# Patient Record
Sex: FEMALE | Race: WHITE | NOT HISPANIC OR LATINO | Employment: STUDENT | URBAN - METROPOLITAN AREA
[De-identification: names, ages, dates, MRNs, and addresses within clinical notes are randomized per-mention and may not be internally consistent; named-entity substitution may affect disease eponyms.]

---

## 2017-01-24 ENCOUNTER — OFFICE VISIT (OUTPATIENT)
Dept: PSYCHIATRY | Facility: CLINIC | Age: 27
End: 2017-01-24
Payer: COMMERCIAL

## 2017-01-24 VITALS
WEIGHT: 131 LBS | DIASTOLIC BLOOD PRESSURE: 84 MMHG | HEIGHT: 68 IN | BODY MASS INDEX: 19.85 KG/M2 | SYSTOLIC BLOOD PRESSURE: 124 MMHG | HEART RATE: 83 BPM

## 2017-01-24 DIAGNOSIS — F33.41 RECURRENT MAJOR DEPRESSIVE DISORDER, IN PARTIAL REMISSION: ICD-10-CM

## 2017-01-24 DIAGNOSIS — F12.90 MARIJUANA SMOKER, CONTINUOUS: Primary | ICD-10-CM

## 2017-01-24 PROCEDURE — 1159F MED LIST DOCD IN RCRD: CPT | Mod: S$GLB,,, | Performed by: PSYCHIATRY & NEUROLOGY

## 2017-01-24 PROCEDURE — 99214 OFFICE O/P EST MOD 30 MIN: CPT | Mod: S$GLB,,, | Performed by: PSYCHIATRY & NEUROLOGY

## 2017-01-24 PROCEDURE — 99999 PR PBB SHADOW E&M-EST. PATIENT-LVL I: CPT | Mod: PBBFAC,,, | Performed by: PSYCHIATRY & NEUROLOGY

## 2017-01-24 PROCEDURE — 90838 PSYTX W PT W E/M 60 MIN: CPT | Mod: S$GLB,,, | Performed by: PSYCHIATRY & NEUROLOGY

## 2017-01-24 RX ORDER — VENLAFAXINE HYDROCHLORIDE 150 MG/1
150 CAPSULE, EXTENDED RELEASE ORAL DAILY
Qty: 30 CAPSULE | Refills: 5 | Status: SHIPPED | OUTPATIENT
Start: 2017-01-24 | End: 2017-09-14 | Stop reason: SDUPTHER

## 2017-02-02 ENCOUNTER — OFFICE VISIT (OUTPATIENT)
Dept: FAMILY MEDICINE | Facility: CLINIC | Age: 27
End: 2017-02-02
Attending: FAMILY MEDICINE
Payer: COMMERCIAL

## 2017-02-02 VITALS
OXYGEN SATURATION: 98 % | BODY MASS INDEX: 20.03 KG/M2 | DIASTOLIC BLOOD PRESSURE: 70 MMHG | HEART RATE: 84 BPM | SYSTOLIC BLOOD PRESSURE: 100 MMHG | TEMPERATURE: 98 F | WEIGHT: 132.19 LBS | HEIGHT: 68 IN

## 2017-02-02 DIAGNOSIS — L73.2 HIDRADENITIS SUPPURATIVA OF LEFT AXILLA: Primary | ICD-10-CM

## 2017-02-02 PROCEDURE — 99213 OFFICE O/P EST LOW 20 MIN: CPT | Mod: S$GLB,,, | Performed by: FAMILY MEDICINE

## 2017-02-02 PROCEDURE — 99999 PR PBB SHADOW E&M-EST. PATIENT-LVL III: CPT | Mod: PBBFAC,,, | Performed by: FAMILY MEDICINE

## 2017-02-02 RX ORDER — DOXYCYCLINE HYCLATE 150 MG/1
150 TABLET ORAL 2 TIMES DAILY
COMMUNITY
End: 2017-02-02 | Stop reason: ALTCHOICE

## 2017-02-02 RX ORDER — MUPIROCIN 20 MG/G
OINTMENT TOPICAL
Refills: 0 | COMMUNITY
Start: 2017-01-06 | End: 2019-11-05

## 2017-02-02 RX ORDER — TRIAMCINOLONE ACETONIDE 1 MG/G
CREAM TOPICAL
Refills: 0 | COMMUNITY
Start: 2017-01-06 | End: 2019-11-05

## 2017-02-02 RX ORDER — TETRACYCLINE HYDROCHLORIDE 500 MG/1
500 CAPSULE ORAL 2 TIMES DAILY
Qty: 20 CAPSULE | Refills: 0 | Status: SHIPPED | OUTPATIENT
Start: 2017-02-02 | End: 2017-02-12 | Stop reason: ALTCHOICE

## 2017-02-02 NOTE — MR AVS SNAPSHOT
[facility] Medicine  [address]  [address]  [facility] [address]  Phone: [phone]                  Aby Correa   2017 2:40 PM   Office Visit    Description:  Female : 1990   Provider:  Hitesh Keller Jr., MD   Department:  formerly [facility]           Reason for Visit     Mass           Diagnoses this Visit        Comments    Hidradenitis suppurativa of left axilla    -  Primary            To Do List           Future Appointments        Provider Department Dept Phone    2017 9:15 AM Emory Bender MD [facility] - General Surgery [phone]      Goals (5 Years of Data)     None       These Medications        Disp Refills Start End    tetracycline (ACHROMYCIN,SUMYCIN) 500 MG capsule 20 capsule 0 2017     Take 1 capsule (500 mg total) by mouth 2 (two) times daily. - Oral    Pharmacy: [facility]/pharmacy #[id number] - [place], [place] - [address] Ph #: [phone]         [facility] On Call     [facility] On Call Nurse Care Line -  Assistance  Registered nurses in the [facility] On Call Center provide clinical advisement, health education, appointment booking, and other advisory services.  Call for this free service at [phone].             Medications           Message regarding Medications     Verify the changes and/or additions to your medication regime listed below are the same as discussed with your clinician today.  If any of these changes or additions are incorrect, please notify your healthcare provider.        START taking these NEW medications        Refills    tetracycline (ACHROMYCIN,SUMYCIN) 500 MG capsule 0    Sig: Take 1 capsule (500 mg total) by mouth 2 (two) times daily.    Class: Normal    Route: Oral      STOP taking these medications     doxycycline hyclate (ACTICLATE) 150 mg Tab Take 150 mg by mouth 2 (two) times daily.           Verify that the below list of medications is an accurate representation of the medications  "you are currently taking.  If none reported, the list may be blank. If incorrect, please contact your healthcare provider. Carry this list with you in case of emergency.           Current Medications     mupirocin (BACTROBAN) 2 % ointment As directed    triamcinolone acetonide 0.1% (KENALOG) 0.1 % cream As directed    venlafaxine (EFFEXOR-XR) 150 MG Cp24 Take 1 capsule (150 mg total) by mouth once daily.    tetracycline (ACHROMYCIN,SUMYCIN) 500 MG capsule Take 1 capsule (500 mg total) by mouth 2 (two) times daily.           Clinical Reference Information           Your Vitals Were     BP Pulse Temp Height Weight SpO2    100/70 (BP Location: Right arm, Patient Position: Sitting, BP Method: Manual) 84 98.1 °F (36.7 °C) (Oral) 5' 8" (1.727 m) 60 kg (132 lb 3.2 oz) 98%    BMI                20.1 kg/m2          Blood Pressure          Most Recent Value    BP  100/70      Allergies as of 2/2/2017     No Known Allergies      Immunizations Administered on Date of Encounter - 2/2/2017     None      Orders Placed During Today's Visit      Normal Orders This Visit    Ambulatory referral to General Surgery       Language Assistance Services     ATTENTION: Language assistance services are available, free of charge. Please call [phone].      ATENCIÓN: Si kenisha smith, tiene a dobbins disposición servicios gratuitos de asistencia lingüística. Llame al [phone].     [facility] Ý: N?u b?n nói Ti?ng Vi?t, có các d?ch v? h? tr? ngôn ng? mi?n phí dành cho b?n. G?i s? [phone].         [facility] complies with applicable Federal civil rights laws and does not discriminate on the basis of race, color, national origin, age, disability, or sex.        "

## 2017-03-09 ENCOUNTER — PATIENT MESSAGE (OUTPATIENT)
Dept: PSYCHIATRY | Facility: CLINIC | Age: 27
End: 2017-03-09

## 2017-06-19 ENCOUNTER — PATIENT MESSAGE (OUTPATIENT)
Dept: PSYCHIATRY | Facility: CLINIC | Age: 27
End: 2017-06-19

## 2017-09-14 ENCOUNTER — PATIENT MESSAGE (OUTPATIENT)
Dept: FAMILY MEDICINE | Facility: CLINIC | Age: 27
End: 2017-09-14

## 2017-09-14 RX ORDER — VENLAFAXINE HYDROCHLORIDE 150 MG/1
150 CAPSULE, EXTENDED RELEASE ORAL DAILY
Qty: 30 CAPSULE | Refills: 1 | Status: SHIPPED | OUTPATIENT
Start: 2017-09-14 | End: 2017-11-15 | Stop reason: SDUPTHER

## 2017-09-16 ENCOUNTER — PATIENT MESSAGE (OUTPATIENT)
Dept: FAMILY MEDICINE | Facility: CLINIC | Age: 27
End: 2017-09-16

## 2017-11-15 ENCOUNTER — PATIENT MESSAGE (OUTPATIENT)
Dept: FAMILY MEDICINE | Facility: CLINIC | Age: 27
End: 2017-11-15

## 2017-11-15 RX ORDER — VENLAFAXINE HYDROCHLORIDE 150 MG/1
150 CAPSULE, EXTENDED RELEASE ORAL DAILY
Qty: 30 CAPSULE | Refills: 1 | Status: SHIPPED | OUTPATIENT
Start: 2017-11-15 | End: 2018-01-15

## 2019-11-06 ENCOUNTER — OFFICE VISIT (OUTPATIENT)
Dept: FAMILY MEDICINE | Facility: CLINIC | Age: 29
End: 2019-11-06
Attending: FAMILY MEDICINE
Payer: COMMERCIAL

## 2019-11-06 VITALS
BODY MASS INDEX: 23.08 KG/M2 | HEIGHT: 68 IN | SYSTOLIC BLOOD PRESSURE: 122 MMHG | OXYGEN SATURATION: 98 % | WEIGHT: 152.31 LBS | HEART RATE: 88 BPM | DIASTOLIC BLOOD PRESSURE: 82 MMHG

## 2019-11-06 DIAGNOSIS — Z00.00 ROUTINE GENERAL MEDICAL EXAMINATION AT A HEALTH CARE FACILITY: Primary | ICD-10-CM

## 2019-11-06 DIAGNOSIS — F33.41 RECURRENT MAJOR DEPRESSIVE DISORDER, IN PARTIAL REMISSION: ICD-10-CM

## 2019-11-06 DIAGNOSIS — F12.20 CANNABIS DEPENDENCE, UNCOMPLICATED: ICD-10-CM

## 2019-11-06 DIAGNOSIS — F41.1 GAD (GENERALIZED ANXIETY DISORDER): ICD-10-CM

## 2019-11-06 DIAGNOSIS — Z97.5 IUD (INTRAUTERINE DEVICE) IN PLACE: ICD-10-CM

## 2019-11-06 PROCEDURE — 99999 PR PBB SHADOW E&M-EST. PATIENT-LVL III: CPT | Mod: PBBFAC,,, | Performed by: FAMILY MEDICINE

## 2019-11-06 PROCEDURE — 99999 PR PBB SHADOW E&M-EST. PATIENT-LVL III: ICD-10-PCS | Mod: PBBFAC,,, | Performed by: FAMILY MEDICINE

## 2019-11-06 PROCEDURE — 99395 PREV VISIT EST AGE 18-39: CPT | Mod: S$GLB,,, | Performed by: FAMILY MEDICINE

## 2019-11-06 PROCEDURE — 99395 PR PREVENTIVE VISIT,EST,18-39: ICD-10-PCS | Mod: S$GLB,,, | Performed by: FAMILY MEDICINE

## 2019-11-06 RX ORDER — VENLAFAXINE HYDROCHLORIDE 150 MG/1
300 CAPSULE, EXTENDED RELEASE ORAL DAILY
Refills: 0 | COMMUNITY
Start: 2019-10-30 | End: 2019-11-06

## 2019-11-06 RX ORDER — VENLAFAXINE HYDROCHLORIDE 150 MG/1
300 CAPSULE, EXTENDED RELEASE ORAL DAILY
Qty: 60 CAPSULE | Refills: 2 | Status: SHIPPED | OUTPATIENT
Start: 2019-11-06 | End: 2020-03-09 | Stop reason: SDUPTHER

## 2019-11-06 NOTE — PROGRESS NOTES
"Subjective:       Patient ID: Aby Correa is a 29 y.o. female who presents today for her 1st visit with me in more than 2 years.  She scheduled a visit a wellness exam as well as prescription refills.  I have not prescribed any medication for the patient in 2 years.    She has been seeing a psychiatrist, but is looking to "switch."    Chief Complaint: Annual Exam    HPI    The patient presents to the office today requesting a routine periodic health examination.    Patient Active Problem List   Diagnosis    Major depression in partial remission    SABI (generalized anxiety disorder)    IUD (intrauterine device) in place    Cannabis dependence, uncomplicated       Past Surgical History:   Procedure Laterality Date    STRABISMUS SURGERY Bilateral 09/05/12         Current Outpatient Medications:     FLUARIX QUAD [id number], PF, 60 mcg (15 mcg x 4)/0.5 mL Syrg, ADM 0.5ML IM UTD, Disp: , Rfl: 0    venlafaxine (EFFEXOR-XR) 150 MG Cp24, Take 300 mg by mouth once daily., Disp: , Rfl: 0    Review of patient's allergies indicates:  No Known Allergies    Family History   Problem Relation Age of Onset    Depression Mother     Anxiety disorder Mother     ADD / ADHD Father     Prostate cancer Father        Social History     Socioeconomic History    Marital status:      Spouse name: Not on file    Number of children: 0    Years of education: Not on file    Highest education level: Not on file   Occupational History    Occupation:    Social Needs    Financial resource strain: Somewhat hard    Food insecurity:     Worry: Never true     Inability: Never true    Transportation needs:     Medical: No     Non-medical: No   Tobacco Use    Smoking status: Never Smoker    Smokeless tobacco: Never Used   Substance and Sexual Activity    Alcohol use: Yes     Alcohol/week: 28.0 - 42.0 standard drinks     Types: 14 - 21 Glasses of wine, 14 - 21 Cans of beer per week     Frequency: 4 or more times a " week     Drinks per session: 3 or 4    Drug use: Yes     Frequency: 7.0 times per week     Types: Marijuana    Sexual activity: Yes     Partners: Male     Birth control/protection: IUD   Lifestyle    Physical activity:     Days per week: 0 days     Minutes per session: Not on file    Stress: Rather much   Relationships    Social connections:     Talks on phone: Once a week     Gets together: Three times a week     Attends Mandaeism service: Never     Active member of club or organization: Yes     Attends meetings of clubs or organizations: More than 4 times per year     Relationship status:    Other Topics Concern    Patient feels they ought to cut down on drinking/drug use No    Patient annoyed by others criticizing their drinking/drug use No    Patient has felt bad or guilty about drinking/drug use No    Patient has had a drink/used drugs as an eye opener in the AM No   Social History Narrative    The patient does not exercise regularly ().      Rates diet as fair.      She is satisfied with weight.    She does not drink at least 1/2 gallon water daily.    She drinks 2 coffee/tea/caffeine-containing soft drinks daily.    Total sleep time at night is 8 hours.    She works 40 hours per week.    She does wear seat belts.    Hobbies include costuming.               OB History        0    Para   0    Term   0       0    AB   0    Living   0       SAB   0    TAB   0    Ectopic   0    Multiple   0    Live Births               Obstetric Comments   Menarche age 16.   Menses irregular (recently had IUD inserted).  History of abnormal PAP smear: NO.  History of sexually transmitted disease:  NO                   Patient Care Team:  Hitesh Keller Jr., MD as PCP - General (Family Medicine)  Niki Love MA as Care Coordinator      Review of Systems   Constitutional: Negative for fatigue and unexpected weight change.   HENT: Negative for ear discharge, ear pain, hearing loss, tinnitus  "and voice change.    Respiratory: Negative for cough and shortness of breath.    Cardiovascular: Negative for chest pain, palpitations and leg swelling.   Gastrointestinal: Negative for abdominal pain, blood in stool, constipation, diarrhea, nausea and vomiting.   Genitourinary: Negative for difficulty urinating, dyspareunia, dysuria, frequency and hematuria.   Musculoskeletal: Negative for arthralgias, back pain and myalgias.   Skin: Negative for rash.   Neurological: Negative for dizziness, weakness, light-headedness and headaches.   Hematological: Does not bruise/bleed easily.   Psychiatric/Behavioral: Negative for dysphoric mood and sleep disturbance. The patient is not nervous/anxious.          Objective:      /82   Pulse 88   Ht 5' 8" (1.727 m)   Wt 69.1 kg (152 lb 4.8 oz)   SpO2 98%   BMI 23.16 kg/m²     Physical Exam   Constitutional: She is oriented to person, place, and time. She appears well-developed and well-nourished. She is cooperative.   HENT:   Head: Normocephalic and atraumatic.   Nose: Nose normal.   Mouth/Throat: Oropharynx is clear and moist and mucous membranes are normal.   Eyes: Conjunctivae are normal. No scleral icterus.   Neck: Neck supple. No JVD present. Carotid bruit is not present. No thyromegaly present.   Cardiovascular: Normal rate, regular rhythm, normal heart sounds and normal pulses. Exam reveals no gallop and no friction rub.   No murmur heard.  Pulmonary/Chest: Effort normal and breath sounds normal. She has no wheezes. She has no rhonchi. She has no rales.   Abdominal: Soft. Bowel sounds are normal. She exhibits no distension and no mass. There is no splenomegaly or hepatomegaly. There is no tenderness.   Musculoskeletal: Normal range of motion. She exhibits no edema or tenderness.   Lymphadenopathy:     She has no cervical adenopathy.     She has no axillary adenopathy.   Neurological: She is alert and oriented to person, place, and time. She has normal strength " "and normal reflexes. No cranial nerve deficit or sensory deficit.   Skin: Skin is warm and dry.   Psychiatric: She has a normal mood and affect. Her speech is normal.   Vitals reviewed.        Assessment:       1. Routine general medical examination at a health care facility    2. SABI (generalized anxiety disorder)    3. Recurrent major depressive disorder, in partial remission    4. IUD (intrauterine device) in place    5. Cannabis dependence, uncomplicated        Plan:       Discussed [facility].  Refill venlafaxine.  Discussed with patient the importance of lifestyle modifications, including well-balanced diet and moderate exercise regimen, and reduction in EtOH/cannabis, in reducing risk for cardiovascular/cerebrovascular disease and diabetes.    Refilled SNRI for now.            "This note will not be shared with the patient."  "

## 2020-01-09 ENCOUNTER — PATIENT MESSAGE (OUTPATIENT)
Dept: FAMILY MEDICINE | Facility: CLINIC | Age: 30
End: 2020-01-09

## 2020-03-09 ENCOUNTER — OFFICE VISIT (OUTPATIENT)
Dept: PSYCHIATRY | Facility: CLINIC | Age: 30
End: 2020-03-09
Payer: COMMERCIAL

## 2020-03-09 VITALS
WEIGHT: 147.94 LBS | HEIGHT: 68 IN | HEART RATE: 92 BPM | SYSTOLIC BLOOD PRESSURE: 128 MMHG | DIASTOLIC BLOOD PRESSURE: 80 MMHG | BODY MASS INDEX: 22.42 KG/M2

## 2020-03-09 DIAGNOSIS — F41.1 GAD (GENERALIZED ANXIETY DISORDER): Primary | ICD-10-CM

## 2020-03-09 DIAGNOSIS — F32.1 CURRENT MODERATE EPISODE OF MAJOR DEPRESSIVE DISORDER, UNSPECIFIED WHETHER RECURRENT: ICD-10-CM

## 2020-03-09 PROCEDURE — 99204 PR OFFICE/OUTPT VISIT, NEW, LEVL IV, 45-59 MIN: ICD-10-PCS | Mod: S$GLB,,, | Performed by: NURSE PRACTITIONER

## 2020-03-09 PROCEDURE — 99204 OFFICE O/P NEW MOD 45 MIN: CPT | Mod: S$GLB,,, | Performed by: NURSE PRACTITIONER

## 2020-03-09 PROCEDURE — 99999 PR PBB SHADOW E&M-EST. PATIENT-LVL III: ICD-10-PCS | Mod: PBBFAC,,, | Performed by: NURSE PRACTITIONER

## 2020-03-09 PROCEDURE — 3008F PR BODY MASS INDEX (BMI) DOCUMENTED: ICD-10-PCS | Mod: CPTII,S$GLB,, | Performed by: NURSE PRACTITIONER

## 2020-03-09 PROCEDURE — 3008F BODY MASS INDEX DOCD: CPT | Mod: CPTII,S$GLB,, | Performed by: NURSE PRACTITIONER

## 2020-03-09 PROCEDURE — 99999 PR PBB SHADOW E&M-EST. PATIENT-LVL III: CPT | Mod: PBBFAC,,, | Performed by: NURSE PRACTITIONER

## 2020-03-09 RX ORDER — VENLAFAXINE HYDROCHLORIDE 150 MG/1
150 CAPSULE, EXTENDED RELEASE ORAL DAILY
Qty: 90 CAPSULE | Refills: 1 | Status: SHIPPED | OUTPATIENT
Start: 2020-03-09 | End: 2020-07-09

## 2020-03-09 RX ORDER — ARIPIPRAZOLE 5 MG/1
TABLET ORAL
Qty: 30 TABLET | Refills: 11 | Status: SHIPPED | OUTPATIENT
Start: 2020-03-09 | End: 2020-10-02

## 2020-03-09 RX ORDER — VENLAFAXINE HYDROCHLORIDE 75 MG/1
75 CAPSULE, EXTENDED RELEASE ORAL DAILY
Qty: 90 CAPSULE | Refills: 0 | Status: SHIPPED | OUTPATIENT
Start: 2020-03-09 | End: 2020-06-05
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
## 2020-04-02 ENCOUNTER — OFFICE VISIT (OUTPATIENT)
Dept: OBSTETRICS AND GYNECOLOGY | Facility: CLINIC | Age: 30
End: 2020-04-02
Payer: COMMERCIAL

## 2020-04-02 DIAGNOSIS — Z30.431 ENCOUNTER FOR MANAGEMENT OF INTRAUTERINE CONTRACEPTIVE DEVICE (IUD), UNSPECIFIED IUD MANAGEMENT TYPE: Primary | ICD-10-CM

## 2020-04-02 DIAGNOSIS — Z97.5 IUD (INTRAUTERINE DEVICE) IN PLACE: ICD-10-CM

## 2020-04-02 PROCEDURE — 99201 PR OFFICE/OUTPT VISIT,NEW,LEVL I: CPT | Mod: 95,,, | Performed by: OBSTETRICS & GYNECOLOGY

## 2020-04-02 PROCEDURE — 99201 PR OFFICE/OUTPT VISIT,NEW,LEVL I: ICD-10-PCS | Mod: 95,,, | Performed by: OBSTETRICS & GYNECOLOGY

## 2020-04-14 ENCOUNTER — TELEPHONE (OUTPATIENT)
Dept: OBSTETRICS AND GYNECOLOGY | Facility: CLINIC | Age: 30
End: 2020-04-14

## 2020-04-14 NOTE — TELEPHONE ENCOUNTER
No answer on call to patient to inform her that her mirena is covered. Unable to leave a voicemail for patient, voicemail not set up.

## 2020-07-09 ENCOUNTER — OFFICE VISIT (OUTPATIENT)
Dept: PSYCHIATRY | Facility: CLINIC | Age: 30
End: 2020-07-09
Payer: COMMERCIAL

## 2020-07-09 VITALS
HEART RATE: 85 BPM | WEIGHT: 158.38 LBS | DIASTOLIC BLOOD PRESSURE: 68 MMHG | SYSTOLIC BLOOD PRESSURE: 111 MMHG | BODY MASS INDEX: 24.08 KG/M2

## 2020-07-09 DIAGNOSIS — F41.1 GAD (GENERALIZED ANXIETY DISORDER): Primary | ICD-10-CM

## 2020-07-09 DIAGNOSIS — F32.1 CURRENT MODERATE EPISODE OF MAJOR DEPRESSIVE DISORDER, UNSPECIFIED WHETHER RECURRENT: ICD-10-CM

## 2020-07-09 PROCEDURE — 99213 OFFICE O/P EST LOW 20 MIN: CPT | Mod: S$GLB,,, | Performed by: NURSE PRACTITIONER

## 2020-07-09 PROCEDURE — 99999 PR PBB SHADOW E&M-EST. PATIENT-LVL III: ICD-10-PCS | Mod: PBBFAC,,, | Performed by: NURSE PRACTITIONER

## 2020-07-09 PROCEDURE — 3008F PR BODY MASS INDEX (BMI) DOCUMENTED: ICD-10-PCS | Mod: CPTII,S$GLB,, | Performed by: NURSE PRACTITIONER

## 2020-07-09 PROCEDURE — 3008F BODY MASS INDEX DOCD: CPT | Mod: CPTII,S$GLB,, | Performed by: NURSE PRACTITIONER

## 2020-07-09 PROCEDURE — 99213 PR OFFICE/OUTPT VISIT, EST, LEVL III, 20-29 MIN: ICD-10-PCS | Mod: S$GLB,,, | Performed by: NURSE PRACTITIONER

## 2020-07-09 PROCEDURE — 99999 PR PBB SHADOW E&M-EST. PATIENT-LVL III: CPT | Mod: PBBFAC,,, | Performed by: NURSE PRACTITIONER

## 2020-07-09 RX ORDER — VENLAFAXINE HYDROCHLORIDE 75 MG/1
75 CAPSULE, EXTENDED RELEASE ORAL DAILY
Qty: 30 CAPSULE | Refills: 0 | Status: SHIPPED | OUTPATIENT
Start: 2020-07-09 | End: 2020-10-02

## 2020-07-09 RX ORDER — ESCITALOPRAM OXALATE 10 MG/1
10 TABLET ORAL DAILY
Qty: 30 TABLET | Refills: 11 | Status: SHIPPED | OUTPATIENT
Start: 2020-08-09 | End: 2020-10-02

## 2020-07-09 RX ORDER — VENLAFAXINE 37.5 MG/1
37.5 TABLET ORAL DAILY
Qty: 30 TABLET | Refills: 2 | Status: SHIPPED | OUTPATIENT
Start: 2020-08-09 | End: 2020-10-02

## 2020-07-09 NOTE — PROGRESS NOTES
Outpatient Psychiatry Follow-Up Visit (MD/NP)    7/9/2020    Clinical Status of Patient:  Outpatient (Ambulatory)    Chief Complaint:  Aby Correa is a 29 y.o. female who presents today for follow-up of depression and anxiety.  Met with patient.      Last visit was: 3/07/2020. Chart and  reviewed.    Interval History and Content of Current Session:  Current Psychiatric Medications/changes  · Decrease to Effexor 225 mg po daily x 30 days then decrease 150 mg daily (will continue taper and consider new antidepressant)  · Start Abilify 2.5 mg po daily x 5 days then increase to 5 mg daily    Still feeling depressed and having poor motivation and initiative; no enthusiasm.  No problems with tapering off Effexor.      Plan: taper down to Effexor 75  Mg x 30 days then decrease to 37.5 mg and start Lexapro 10 mg daily. Continue Abilify for now.     Psychotherapy:  · Target symptoms: depression, anxiety   · Why chosen therapy is appropriate versus another modality: relevant to diagnosis  · Outcome monitoring methods: self-report  · Therapeutic intervention type: insight oriented psychotherapy  · Topics discussed/themes: building skills sets for symptom management, symptom recognition  · The patient's response to the intervention is accepting. The patient's progress toward treatment goals is good.   · Duration of intervention: 12 minutes.    Review of Systems   · PSYCHIATRIC: Pertinant items are noted in the narrative.  · CONSTITUTIONAL: No weight gain or loss.   · MUSCULOSKELETAL: No pain or stiffness of the joints.  · NEUROLOGIC: No weakness, sensory changes, seizures, confusion, memory loss, tremor or other abnormal movements.  · ENDOCRINE: No polydipsia or polyuria.  · INTEGUMENTARY: No rashes or lacerations.  · EYES: No exophthalmos, jaundice or blindness.  · ENT: No dizziness, tinnitus or hearing loss.  · RESPIRATORY: No shortness of breath.  · CARDIOVASCULAR: No tachycardia or chest pain.  · GASTROINTESTINAL:  No nausea, vomiting, pain, constipation or diarrhea.  · GENITOURINARY: No frequency, dysuria or sexual dysfunction.  · HEMATOLOGIC/LYMPHATIC: No excessive bleeding, prolonged or excessive bleeding after dental extraction/injury.  · ALLERGIC/IMMUNOLOGIC: No allergic response to materials, foods or animals at this time.    Past Medical, Family and Social History: The patient's past medical, family and social history have been reviewed and updated as appropriate within the electronic medical record - see encounter notes.    Compliance: yes    Side effects: None    Risk Parameters:  Patient reports no suicidal ideation  Patient reports no homicidal ideation  Patient reports no self-injurious behavior  Patient reports no violent behavior    Exam (detailed: at least 9 elements; comprehensive: all 15 elements)   Constitutional  Vitals:  Most recent vital signs, dated less than 90 days prior to this appointment, were reviewed.   Vitals:    07/09/20 0835   BP: 111/68   Pulse: 85   Weight: 71.9 kg (158 lb 6.4 oz)        General:  unremarkable, age appropriate     Musculoskeletal  Muscle Strength/Tone:  no tremor, no tic   Gait & Station:  non-ataxic     Psychiatric  Speech:  no latency; no press   Mood & Affect:  euthymic  congruent and appropriate   Thought Process:  normal and logical   Associations:  intact   Thought Content:  normal, no suicidality, no homicidality, delusions, or paranoia   Insight:  intact   Judgement: behavior is adequate to circumstances   Orientation:  grossly intact   Memory: intact for content of interview   Language: grossly intact   Attention Span & Concentration:  able to focus   Fund of Knowledge:  intact and appropriate to age and level of education     Assessment and Diagnosis   Status/Progress: Based on the examination today, the patient's problem(s) is/are no change.  New problems have not been presented today.   Co-morbidities and Lack of compliance are not complicating management of the  primary condition.  There are no active rule-out diagnoses for this patient at this time.     General Impression:       ICD-10-CM ICD-9-CM   1. SABI (generalized anxiety disorder)  F41.1 300.02   2. Current moderate episode of major depressive disorder, unspecified whether recurrent  F32.1 296.22       Intervention/Counseling/Treatment Plan   · Medication Management: The risks and benefits of medication were discussed with the patient.   · Continue Abilify 5 mg po daily  · Taper down to Effexor 75  Mg x 30 days then decrease to 37.5 mg and start Lexapro 10 mg daily.     Return to Clinic: 3 months    Risks, benefits, side effects and alternative treatments discussed with patient. Patient agrees with the current plan as documented.  Encouraged Patient to keep future appointments.  Take medications as prescribed and abstain from substance abuse.  Pt to present to ED for thoughts to harm herself or others

## 2020-10-02 ENCOUNTER — OFFICE VISIT (OUTPATIENT)
Dept: PSYCHIATRY | Facility: CLINIC | Age: 30
End: 2020-10-02
Payer: COMMERCIAL

## 2020-10-02 DIAGNOSIS — F32.1 CURRENT MODERATE EPISODE OF MAJOR DEPRESSIVE DISORDER, UNSPECIFIED WHETHER RECURRENT: Primary | ICD-10-CM

## 2020-10-02 DIAGNOSIS — F41.1 GAD (GENERALIZED ANXIETY DISORDER): ICD-10-CM

## 2020-10-02 PROCEDURE — 99213 PR OFFICE/OUTPT VISIT, EST, LEVL III, 20-29 MIN: ICD-10-PCS | Mod: 95,,, | Performed by: NURSE PRACTITIONER

## 2020-10-02 PROCEDURE — 99213 OFFICE O/P EST LOW 20 MIN: CPT | Mod: 95,,, | Performed by: NURSE PRACTITIONER

## 2020-10-02 RX ORDER — ESCITALOPRAM OXALATE 20 MG/1
20 TABLET ORAL DAILY
Qty: 30 TABLET | Refills: 5 | Status: SHIPPED | OUTPATIENT
Start: 2020-10-02 | End: 2021-02-03

## 2020-10-02 RX ORDER — BUPROPION HYDROCHLORIDE 150 MG/1
150 TABLET ORAL DAILY
Qty: 7 TABLET | Refills: 0 | Status: SHIPPED | OUTPATIENT
Start: 2020-10-02 | End: 2020-10-09

## 2020-10-02 RX ORDER — BUPROPION HYDROCHLORIDE 300 MG/1
300 TABLET ORAL DAILY
Qty: 30 TABLET | Refills: 11 | Status: SHIPPED | OUTPATIENT
Start: 2020-10-02 | End: 2021-02-03

## 2020-10-02 NOTE — PROGRESS NOTES
Outpatient Psychiatry Follow-Up Visit (MD/NP)    10/2/2020    Clinical Status of Patient:  Outpatient (Ambulatory)    Chief Complaint:  Aby Correa is a 29 y.o. female who presents today for follow-up of depression and anxiety.  Met with patient.      Last visit was: 7/09/2020. Chart and  reviewed.  The patient location is: home  The chief complaint leading to consultation is: anxiety and depression    Visit type: audiovisual    Face to Face time with patient: 26 minutes  30 minutes of total time spent on the encounter, which includes face to face time and non-face to face time preparing to see the patient (eg, review of tests), Obtaining and/or reviewing separately obtained history, Documenting clinical information in the electronic or other health record, Independently interpreting results (not separately reported) and communicating results to the patient/family/caregiver, or Care coordination (not separately reported).     Each patient to whom he or she provides medical services by telemedicine is:  (1) informed of the relationship between the physician and patient and the respective role of any other health care provider with respect to management of the patient; and (2) notified that he or she may decline to receive medical services by telemedicine and may withdraw from such care at any time.    Interval History and Content of Current Session:  Current Psychiatric Medications/changes  · Continue Abilify 5 mg po daily  · Taper down to Effexor 75  Mg x 30 days then decrease to 37.5 mg and start Lexapro 10 mg daily.     Virtual Visit: Pt continues to report depression symptoms: avolition, and poor motivation. Reports some effectiveness in Lexapro for anxiety and mood.  Denies SNRI withdrawal.  Denies any effectiveness from Ability.  Will try Wellbutrin. Denies SI/HI/AVH.     Psychotherapy:  · Target symptoms: depression, anxiety   · Why chosen therapy is appropriate versus another modality: relevant to  diagnosis  · Outcome monitoring methods: self-report  · Therapeutic intervention type: insight oriented psychotherapy  · Topics discussed/themes: building skills sets for symptom management, symptom recognition  · The patient's response to the intervention is accepting. The patient's progress toward treatment goals is good.   · Duration of intervention: 12 minutes.    Review of Systems   · PSYCHIATRIC: Pertinant items are noted in the narrative.  · CONSTITUTIONAL: No weight gain or loss.   · MUSCULOSKELETAL: No pain or stiffness of the joints.  · NEUROLOGIC: No weakness, sensory changes, seizures, confusion, memory loss, tremor or other abnormal movements.  · ENDOCRINE: No polydipsia or polyuria.  · INTEGUMENTARY: No rashes or lacerations.  · EYES: No exophthalmos, jaundice or blindness.  · ENT: No dizziness, tinnitus or hearing loss.  · RESPIRATORY: No shortness of breath.  · CARDIOVASCULAR: No tachycardia or chest pain.  · GASTROINTESTINAL: No nausea, vomiting, pain, constipation or diarrhea.  · GENITOURINARY: No frequency, dysuria or sexual dysfunction.  · HEMATOLOGIC/LYMPHATIC: No excessive bleeding, prolonged or excessive bleeding after dental extraction/injury.  · ALLERGIC/IMMUNOLOGIC: No allergic response to materials, foods or animals at this time.    Past Medical, Family and Social History: The patient's past medical, family and social history have been reviewed and updated as appropriate within the electronic medical record - see encounter notes.    Compliance: yes    Side effects: None    Risk Parameters:  Patient reports no suicidal ideation  Patient reports no homicidal ideation  Patient reports no self-injurious behavior  Patient reports no violent behavior    Exam (detailed: at least 9 elements; comprehensive: all 15 elements)   Constitutional  Vitals:  Most recent vital signs, dated less than 90 days prior to this appointment, were reviewed.   There were no vitals filed for this visit.     General:   unremarkable, age appropriate     Musculoskeletal  Muscle Strength/Tone:  no tremor, no tic   Gait & Station:  non-ataxic     Psychiatric  Speech:  no latency; no press   Mood & Affect:  euthymic  congruent and appropriate   Thought Process:  normal and logical   Associations:  intact   Thought Content:  normal, no suicidality, no homicidality, delusions, or paranoia   Insight:  intact   Judgement: behavior is adequate to circumstances   Orientation:  grossly intact   Memory: intact for content of interview   Language: grossly intact   Attention Span & Concentration:  able to focus   Fund of Knowledge:  intact and appropriate to age and level of education     Assessment and Diagnosis   Status/Progress: Based on the examination today, the patient's problem(s) is/are no change.  New problems have not been presented today.   Co-morbidities and Lack of compliance are not complicating management of the primary condition.  There are no active rule-out diagnoses for this patient at this time.     General Impression:       ICD-10-CM ICD-9-CM   1. Current moderate episode of major depressive disorder, unspecified whether recurrent  F32.1 296.22   2. SABI (generalized anxiety disorder)  F41.1 300.02       Intervention/Counseling/Treatment Plan   · Medication Management: The risks and benefits of medication were discussed with the patient.   · DC Abilify  · Increase to Lexapro 20 mg po daily  · Start Wellbutrin  mg po daily x 1 week then increase to 300 mg daily     Return to Clinic: 3 months    Risks, benefits, side effects and alternative treatments discussed with patient. Patient agrees with the current plan as documented.  Encouraged Patient to keep future appointments.  Take medications as prescribed and abstain from substance abuse.  Pt to present to ED for thoughts to harm herself or others

## 2020-10-05 ENCOUNTER — PATIENT MESSAGE (OUTPATIENT)
Dept: INTERNAL MEDICINE | Facility: CLINIC | Age: 30
End: 2020-10-05

## 2020-10-09 RX ORDER — INFLUENZA A VIRUS A/NEBRASKA/14/2019 (H1N1) ANTIGEN (MDCK CELL DERIVED, PROPIOLACTONE INACTIVATED), INFLUENZA A VIRUS A/DELAWARE/39/2019 (H3N2) ANTIGEN (MDCK CELL DERIVED, PROPIOLACTONE INACTIVATED), INFLUENZA B VIRUS B/SINGAPORE/INFTT-16-0610/2016 ANTIGEN (MDCK CELL DERIVED, PROPIOLACTONE INACTIVATED), INFLUENZA B VIRUS B/DARWIN/7/2019 ANTIGEN (MDCK CELL DERIVED, PROPIOLACTONE INACTIVATED) 15; 15; 15; 15 UG/.5ML; UG/.5ML; UG/.5ML; UG/.5ML
INJECTION, SUSPENSION INTRAMUSCULAR
COMMUNITY
Start: 2020-09-29 | End: 2020-09-29

## 2020-10-09 NOTE — PROGRESS NOTES
"Subjective:       Patient ID: Aby Correa is a 29 y.o. female.    Chief Complaint: Emesis    Emesis   This is a chronic problem. The current episode started more than 1 month ago. The problem occurs less than 2 times per day. The problem has been unchanged. The emesis has an appearance of bile. There has been no fever. Associated symptoms include diarrhea (every 3 days: 1 episode.  Normal BMs in between ). Pertinent negatives include no chest pain, chills, dizziness, myalgias or sweats. Risk factors: none. She has tried nothing for the symptoms.     There was a  Recent change in her medications: weaned off Effexor, now on Wellbutrin + Lexapro.    Patient Active Problem List   Diagnosis    Major depression in partial remission    SABI (generalized anxiety disorder)    IUD (intrauterine device) in place    Cannabis dependence, uncomplicated         Current Outpatient Medications:     buPROPion (WELLBUTRIN XL) 300 MG 24 hr tablet, Take 1 tablet (300 mg total) by mouth once daily. Start on week 2, Disp: 30 tablet, Rfl: 11    escitalopram oxalate (LEXAPRO) 20 MG tablet, Take 1 tablet (20 mg total) by mouth once daily., Disp: 30 tablet, Rfl: 5    The following portions of the patient's history were reviewed and updated as appropriate: allergies, past family history, past medical history, past social history and past surgical history.    Review of Systems   Constitutional: Negative for chills.   Cardiovascular: Negative for chest pain.   Gastrointestinal: Positive for diarrhea (every 3 days: 1 episode.  Normal BMs in between ) and vomiting. Negative for abdominal distention.        Belching, burping   Musculoskeletal: Negative for myalgias.   Neurological: Negative for dizziness.       Objective:      BP 96/60   Pulse 77   Ht 5' 8" (1.727 m)   Wt 67.1 kg (148 lb)   SpO2 97%   BMI 22.50 kg/m²     Physical Exam  HENT:      Head: Normocephalic and atraumatic.   Abdominal:      General: Abdomen is flat. Bowel " "sounds are decreased.      Palpations: Abdomen is soft.      Tenderness: There is abdominal tenderness in the right upper quadrant, right lower quadrant and epigastric area. There is no right CVA tenderness, left CVA tenderness or guarding. Negative signs include McBurney's sign.   Skin:     General: Skin is warm and dry.   Neurological:      Mental Status: She is alert and oriented to person, place, and time.   Psychiatric:         Mood and Affect: Mood normal.         Assessment:       1. Bilious vomiting with nausea    2. Major depressive disorder in partial remission, unspecified whether recurrent        Plan:       Reminded patient that she is overdue for her cervical cancer screening with her gynecologist, whom she just saw in the spring.  Offered Tdap vaccine.  Remainder of Health Maintenance reviewed - up to date.    Doubt related to her medication regimen.  Mild chronic appendicitis?  GB disease?    Orders Placed This Encounter    X-Ray Abdomen Flat And Erect    US Abdomen Complete    Tdap Vaccine    CBC auto differential    Comprehensive Metabolic Panel    Urinalysis, Reflex to Urine Culture Urine, Clean Catch     We will call the patient with results & make further recommendations at that time.          "This note will not be shared with the patient."    "

## 2020-10-12 ENCOUNTER — HOSPITAL ENCOUNTER (OUTPATIENT)
Dept: RADIOLOGY | Facility: HOSPITAL | Age: 30
Discharge: HOME | End: 2020-10-12
Attending: FAMILY MEDICINE
Payer: COMMERCIAL

## 2020-10-12 ENCOUNTER — OFFICE VISIT (OUTPATIENT)
Dept: FAMILY MEDICINE | Facility: CLINIC | Age: 30
End: 2020-10-12
Attending: FAMILY MEDICINE
Payer: COMMERCIAL

## 2020-10-12 ENCOUNTER — PATIENT MESSAGE (OUTPATIENT)
Dept: FAMILY MEDICINE | Facility: CLINIC | Age: 30
End: 2020-10-12

## 2020-10-12 VITALS
BODY MASS INDEX: 22.43 KG/M2 | OXYGEN SATURATION: 97 % | HEIGHT: 68 IN | DIASTOLIC BLOOD PRESSURE: 60 MMHG | HEART RATE: 77 BPM | WEIGHT: 148 LBS | SYSTOLIC BLOOD PRESSURE: 96 MMHG

## 2020-10-12 DIAGNOSIS — R11.14 BILIOUS VOMITING WITH NAUSEA: Primary | ICD-10-CM

## 2020-10-12 DIAGNOSIS — R11.14 BILIOUS VOMITING WITH NAUSEA: ICD-10-CM

## 2020-10-12 DIAGNOSIS — F32.4 MAJOR DEPRESSIVE DISORDER IN PARTIAL REMISSION, UNSPECIFIED WHETHER RECURRENT: ICD-10-CM

## 2020-10-12 PROCEDURE — 76700 US EXAM ABDOM COMPLETE: CPT | Mod: 26,,, | Performed by: RADIOLOGY

## 2020-10-12 PROCEDURE — 99999 PR PBB SHADOW E&M-EST. PATIENT-LVL III: CPT | Mod: PBBFAC,,, | Performed by: FAMILY MEDICINE

## 2020-10-12 PROCEDURE — 90471 IMMUNIZATION ADMIN: CPT | Mod: S$GLB,,, | Performed by: FAMILY MEDICINE

## 2020-10-12 PROCEDURE — 74019 RADEX ABDOMEN 2 VIEWS: CPT | Mod: 26,,, | Performed by: INTERNAL MEDICINE

## 2020-10-12 PROCEDURE — 90715 TDAP VACCINE 7 YRS/> IM: CPT | Mod: S$GLB,,, | Performed by: FAMILY MEDICINE

## 2020-10-12 PROCEDURE — 99214 PR OFFICE/OUTPT VISIT, EST, LEVL IV, 30-39 MIN: ICD-10-PCS | Mod: 25,S$GLB,, | Performed by: FAMILY MEDICINE

## 2020-10-12 PROCEDURE — 74019 RADEX ABDOMEN 2 VIEWS: CPT | Mod: TC

## 2020-10-12 PROCEDURE — 74019 XR ABDOMEN FLAT AND ERECT: ICD-10-PCS | Mod: 26,,, | Performed by: INTERNAL MEDICINE

## 2020-10-12 PROCEDURE — 81001 URINALYSIS AUTO W/SCOPE: CPT

## 2020-10-12 PROCEDURE — 3008F BODY MASS INDEX DOCD: CPT | Mod: CPTII,S$GLB,, | Performed by: FAMILY MEDICINE

## 2020-10-12 PROCEDURE — 99214 OFFICE O/P EST MOD 30 MIN: CPT | Mod: 25,S$GLB,, | Performed by: FAMILY MEDICINE

## 2020-10-12 PROCEDURE — 90471 TDAP VACCINE GREATER THAN OR EQUAL TO 7YO IM: ICD-10-PCS | Mod: S$GLB,,, | Performed by: FAMILY MEDICINE

## 2020-10-12 PROCEDURE — 76700 US ABDOMEN COMPLETE: ICD-10-PCS | Mod: 26,,, | Performed by: RADIOLOGY

## 2020-10-12 PROCEDURE — 3008F PR BODY MASS INDEX (BMI) DOCUMENTED: ICD-10-PCS | Mod: CPTII,S$GLB,, | Performed by: FAMILY MEDICINE

## 2020-10-12 PROCEDURE — 99999 PR PBB SHADOW E&M-EST. PATIENT-LVL III: ICD-10-PCS | Mod: PBBFAC,,, | Performed by: FAMILY MEDICINE

## 2020-10-12 PROCEDURE — 76700 US EXAM ABDOM COMPLETE: CPT | Mod: TC

## 2020-10-12 PROCEDURE — 90715 TDAP VACCINE GREATER THAN OR EQUAL TO 7YO IM: ICD-10-PCS | Mod: S$GLB,,, | Performed by: FAMILY MEDICINE

## 2020-10-12 RX ORDER — ONDANSETRON 8 MG/1
8 TABLET, ORALLY DISINTEGRATING ORAL EVERY 12 HOURS PRN
Qty: 10 TABLET | Refills: 0 | Status: SHIPPED | OUTPATIENT
Start: 2020-10-12 | End: 2021-08-04

## 2020-10-12 NOTE — PROGRESS NOTES
Patient was given Tdap IM in Left Deltoid as per orders from MD. Aseptic tech used and pt tolerated well. Pt was monitored for 15 mins with no reaction noted.

## 2020-10-13 ENCOUNTER — PATIENT MESSAGE (OUTPATIENT)
Dept: FAMILY MEDICINE | Facility: CLINIC | Age: 30
End: 2020-10-13

## 2020-10-23 LAB
AMORPH CRY UR QL COMP ASSIST: ABNORMAL
BACTERIA #/AREA URNS AUTO: ABNORMAL /HPF
BILIRUB UR QL STRIP: NEGATIVE
CLARITY UR REFRACT.AUTO: ABNORMAL
COLOR UR AUTO: YELLOW
GLUCOSE UR QL STRIP: NEGATIVE
HGB UR QL STRIP: NEGATIVE
HYALINE CASTS UR QL AUTO: 0 /LPF
KETONES UR QL STRIP: ABNORMAL
LEUKOCYTE ESTERASE UR QL STRIP: ABNORMAL
MICROSCOPIC COMMENT: ABNORMAL
NITRITE UR QL STRIP: NEGATIVE
PH UR STRIP: 5 [PH] (ref 5–8)
PROT UR QL STRIP: ABNORMAL
RBC #/AREA URNS AUTO: 0 /HPF (ref 0–4)
SP GR UR STRIP: 1.03 (ref 1–1.03)
URN SPEC COLLECT METH UR: ABNORMAL
WBC #/AREA URNS AUTO: 0 /HPF (ref 0–5)

## 2020-10-24 ENCOUNTER — PATIENT MESSAGE (OUTPATIENT)
Dept: FAMILY MEDICINE | Facility: CLINIC | Age: 30
End: 2020-10-24

## 2020-10-24 ENCOUNTER — OFFICE VISIT (OUTPATIENT)
Dept: URGENT CARE | Facility: CLINIC | Age: 30
End: 2020-10-24
Payer: COMMERCIAL

## 2020-10-24 VITALS
RESPIRATION RATE: 16 BRPM | DIASTOLIC BLOOD PRESSURE: 78 MMHG | TEMPERATURE: 98 F | HEIGHT: 68 IN | HEART RATE: 81 BPM | OXYGEN SATURATION: 96 % | SYSTOLIC BLOOD PRESSURE: 118 MMHG | BODY MASS INDEX: 22.43 KG/M2 | WEIGHT: 148 LBS

## 2020-10-24 DIAGNOSIS — L50.9 HIVES: Primary | ICD-10-CM

## 2020-10-24 PROCEDURE — 99214 OFFICE O/P EST MOD 30 MIN: CPT | Mod: 25,S$GLB,, | Performed by: NURSE PRACTITIONER

## 2020-10-24 PROCEDURE — 99214 PR OFFICE/OUTPT VISIT, EST, LEVL IV, 30-39 MIN: ICD-10-PCS | Mod: 25,S$GLB,, | Performed by: NURSE PRACTITIONER

## 2020-10-24 PROCEDURE — 96372 THER/PROPH/DIAG INJ SC/IM: CPT | Mod: S$GLB,,, | Performed by: NURSE PRACTITIONER

## 2020-10-24 PROCEDURE — 96372 PR INJECTION,THERAP/PROPH/DIAG2ST, IM OR SUBCUT: ICD-10-PCS | Mod: S$GLB,,, | Performed by: NURSE PRACTITIONER

## 2020-10-24 RX ORDER — TRIAMCINOLONE ACETONIDE 1 MG/G
CREAM TOPICAL 2 TIMES DAILY
Qty: 1 TUBE | Refills: 0 | Status: SHIPPED | OUTPATIENT
Start: 2020-10-24 | End: 2021-02-03

## 2020-10-24 NOTE — PATIENT INSTRUCTIONS
Continue antihistamine such as Claritin, zyrtec, or benadryl.    Use triamcinolone to help with itching.     Follow up immediately if symptoms persist or worsen.       Hives (Adult)  Hives are pink or red bumps on the skin. These bumps are also known as wheals. The bumps can itch, burn, or sting. Hives can occur anywhere on the body. They vary in size and shape and can form in clusters. Individual hives can appear and go away quickly. New hives may develop as old ones fade. Hives are common and usually harmless. Occasionally hives are a sign of a serious allergy.  Hives are often caused by an allergic reaction. It may be an allergic reaction to foods such as fruit, shellfish, chocolate, nuts, or tomatoes. It may be a reaction to pollens, animal fur, or mold spores. Medicines, chemicals, and insect bites can also cause hives. And hives can be caused by hot sun or cold air. The cause of hives can be difficult to find.  You may be given medicines to relieve swelling and itching. Follow all instructions when using these medicines. The hives will usually fade in a few days, but can last up to 2 weeks.  Home care  Follow these tips:  · Try to find the cause of the hives and eliminate it. Discuss possible causes with your healthcare provider. Future reactions to the same allergen may be worse.  · Dont scratch the hives. Scratching will delay healing. To reduce itching, apply cool, wet compresses to the skin.  · Dress in soft, loose cotton clothing.  · Dont bathe in hot water. This can make the itching worse.  · Apply an ice pack or cool pack wrapped in a thin towel to your skin. This will help reduce redness and itching. But if your hives were caused by exposure to cold, then do not apply more cold to them.  · You may use over-the counter antihistamines to reduce itching. Some older antihistamines, such as diphenhydramine and chlorpheniramine, are inexpensive. But they need to be taken often and may make you sleepy. They  are best used at bedtime. Dont use diphenhydramine if you have glaucoma or have trouble urinating because of an enlarged prostate. Newer antihistamines, such as loratadine, cetirizine, and fexofenadine, are generally more expensive. But they tend to have fewer side effects, such as drowsiness. They can be taken less often.  · Another type of antihistamine is used to treat heartburn. This type includes ranitidine, nizatidine, famotidine, and cimetidine. These are sometimes used along with the above antihistamines if a single medicine is not working.  Follow-up care  Follow up with your healthcare provider if your symptoms don't get better in 2 days. Ask your provider about allergy testing if you have had a severe reaction, or have had several episodes of hives. He or she can use the allergy testing to find out what you are allergic to.  When to seek medical advice  Call your healthcare provider right away if any of these occur:  · Fever of 100.4°F (38.0°C) or higher, or as directed by your healthcare provider  · Redness, swelling, or pain  · Foul-smelling fluid coming from the rash  Call 911  Call 911 if any of the following occur:  · Swelling of the face, throat, or tongue  · Trouble breathing or swallowing  · Dizziness, weakness, or fainting  Date Last Reviewed: 9/1/2016  © 6465-7242 [employer]. [address] [place] [address], [place] [address]. All rights reserved. This information is not intended as a substitute for professional medical care. Always follow your healthcare professional's instructions.

## 2020-10-24 NOTE — PROGRESS NOTES
"Subjective:       Patient ID: Aby Correa is a 30 y.o. female.    Vitals:  height is 5' 8" (1.727 m) and weight is 67.1 kg (148 lb). Her temperature is 98.2 °F (36.8 °C). Her blood pressure is 118/78 and her pulse is 81. Her respiration is 16 and oxygen saturation is 96%.     Chief Complaint: Rash    Rash  This is a new problem. The current episode started in the past 7 days. The problem has been gradually worsening since onset. The rash is diffuse. The rash is characterized by redness, swelling and itchiness. She was exposed to nothing. Pertinent negatives include no cough, fever or sore throat. Treatments tried: bendryl/itch cream. The treatment provided mild relief.       Constitution: Negative for chills and fever.   HENT: Negative for facial swelling and sore throat.    Neck: Negative for painful lymph nodes.   Eyes: Negative for eye itching and eyelid swelling.   Respiratory: Negative for cough.    Musculoskeletal: Negative for joint pain and joint swelling.   Skin: Positive for rash and hives. Negative for color change, pale, wound, abrasion, laceration, lesion, skin thickening/induration, puncture wound, erythema, bruising, abscess and avulsion.   Allergic/Immunologic: Positive for hives and itching. Negative for environmental allergies and immunocompromised state.   Hematologic/Lymphatic: Negative for swollen lymph nodes.       Objective:      Physical Exam   Constitutional: She is oriented to person, place, and time. She appears well-developed. She is cooperative.  Non-toxic appearance. She does not appear ill. No distress.   HENT:   Head: Normocephalic and atraumatic.   Ears:   Right Ear: Hearing, tympanic membrane, external ear and ear canal normal.   Left Ear: Hearing, tympanic membrane, external ear and ear canal normal.   Nose: Nose normal. No mucosal edema, rhinorrhea or nasal deformity. No epistaxis. Right sinus exhibits no maxillary sinus tenderness and no frontal sinus tenderness. Left sinus " exhibits no maxillary sinus tenderness and no frontal sinus tenderness.   Mouth/Throat: Uvula is midline, oropharynx is clear and moist and mucous membranes are normal. No trismus in the jaw. Normal dentition. No uvula swelling. No posterior oropharyngeal erythema.   Eyes: Conjunctivae and lids are normal. Right eye exhibits no discharge. Left eye exhibits no discharge. No scleral icterus.   Neck: Trachea normal, normal range of motion, full passive range of motion without pain and phonation normal. Neck supple.   Cardiovascular: Normal rate, regular rhythm, normal heart sounds and normal pulses.   Pulmonary/Chest: Effort normal and breath sounds normal. No respiratory distress.   Abdominal: Soft. Normal appearance and bowel sounds are normal. She exhibits no distension, no pulsatile midline mass and no mass. There is no abdominal tenderness.   Musculoskeletal: Normal range of motion.         General: No deformity.   Neurological: She is alert and oriented to person, place, and time. She exhibits normal muscle tone. Coordination normal.   Skin: Skin is warm, dry, intact, not diaphoretic, not pale, rash and urticarial. erythema     Psychiatric: Her speech is normal and behavior is normal. Judgment and thought content normal.   Nursing note and vitals reviewed.                    Assessment:       1. Hives        Plan:         Hives  -     methylPREDNISolone sod suc(PF) injection 125 mg  -     triamcinolone acetonide 0.1% (KENALOG) 0.1 % cream; Apply topically 2 (two) times daily.  Dispense: 1 Tube; Refill: 0      Patient Instructions   Continue antihistamine such as Claritin, zyrtec, or benadryl.    Use triamcinolone to help with itching.     Follow up immediately if symptoms persist or worsen.       Hives (Adult)  Hives are pink or red bumps on the skin. These bumps are also known as wheals. The bumps can itch, burn, or sting. Hives can occur anywhere on the body. They vary in size and shape and can form in  clusters. Individual hives can appear and go away quickly. New hives may develop as old ones fade. Hives are common and usually harmless. Occasionally hives are a sign of a serious allergy.  Hives are often caused by an allergic reaction. It may be an allergic reaction to foods such as fruit, shellfish, chocolate, nuts, or tomatoes. It may be a reaction to pollens, animal fur, or mold spores. Medicines, chemicals, and insect bites can also cause hives. And hives can be caused by hot sun or cold air. The cause of hives can be difficult to find.  You may be given medicines to relieve swelling and itching. Follow all instructions when using these medicines. The hives will usually fade in a few days, but can last up to 2 weeks.  Home care  Follow these tips:  · Try to find the cause of the hives and eliminate it. Discuss possible causes with your healthcare provider. Future reactions to the same allergen may be worse.  · Dont scratch the hives. Scratching will delay healing. To reduce itching, apply cool, wet compresses to the skin.  · Dress in soft, loose cotton clothing.  · Dont bathe in hot water. This can make the itching worse.  · Apply an ice pack or cool pack wrapped in a thin towel to your skin. This will help reduce redness and itching. But if your hives were caused by exposure to cold, then do not apply more cold to them.  · You may use over-the counter antihistamines to reduce itching. Some older antihistamines, such as diphenhydramine and chlorpheniramine, are inexpensive. But they need to be taken often and may make you sleepy. They are best used at bedtime. Dont use diphenhydramine if you have glaucoma or have trouble urinating because of an enlarged prostate. Newer antihistamines, such as loratadine, cetirizine, and fexofenadine, are generally more expensive. But they tend to have fewer side effects, such as drowsiness. They can be taken less often.  · Another type of antihistamine is used to treat  heartburn. This type includes ranitidine, nizatidine, famotidine, and cimetidine. These are sometimes used along with the above antihistamines if a single medicine is not working.  Follow-up care  Follow up with your healthcare provider if your symptoms don't get better in 2 days. Ask your provider about allergy testing if you have had a severe reaction, or have had several episodes of hives. He or she can use the allergy testing to find out what you are allergic to.  When to seek medical advice  Call your healthcare provider right away if any of these occur:  · Fever of 100.4°F (38.0°C) or higher, or as directed by your healthcare provider  · Redness, swelling, or pain  · Foul-smelling fluid coming from the rash  Call 911  Call 911 if any of the following occur:  · Swelling of the face, throat, or tongue  · Trouble breathing or swallowing  · Dizziness, weakness, or fainting  Date Last Reviewed: 9/1/2016  © 2595-1549 [employer]. [address] [place] [address], [place] [address]. All rights reserved. This information is not intended as a substitute for professional medical care. Always follow your healthcare professional's instructions.

## 2020-11-30 ENCOUNTER — PATIENT MESSAGE (OUTPATIENT)
Dept: PSYCHIATRY | Facility: CLINIC | Age: 30
End: 2020-11-30

## 2021-01-27 ENCOUNTER — PATIENT MESSAGE (OUTPATIENT)
Dept: PSYCHIATRY | Facility: CLINIC | Age: 31
End: 2021-01-27

## 2021-02-02 ENCOUNTER — PATIENT MESSAGE (OUTPATIENT)
Dept: PSYCHIATRY | Facility: CLINIC | Age: 31
End: 2021-02-02

## 2021-02-02 ENCOUNTER — TELEPHONE (OUTPATIENT)
Dept: PSYCHIATRY | Facility: CLINIC | Age: 31
End: 2021-02-02

## 2021-02-03 ENCOUNTER — OFFICE VISIT (OUTPATIENT)
Dept: PSYCHIATRY | Facility: CLINIC | Age: 31
End: 2021-02-03
Payer: COMMERCIAL

## 2021-02-03 DIAGNOSIS — F33.2 SEVERE EPISODE OF RECURRENT MAJOR DEPRESSIVE DISORDER, WITHOUT PSYCHOTIC FEATURES: Primary | ICD-10-CM

## 2021-02-03 DIAGNOSIS — M79.10 MYALGIA: ICD-10-CM

## 2021-02-03 DIAGNOSIS — F41.1 GAD (GENERALIZED ANXIETY DISORDER): ICD-10-CM

## 2021-02-03 PROCEDURE — 90792 PSYCH DIAG EVAL W/MED SRVCS: CPT | Mod: 95,,, | Performed by: NURSE PRACTITIONER

## 2021-02-03 PROCEDURE — 90792 PR PSYCHIATRIC DIAGNOSTIC EVALUATION W/MEDICAL SERVICES: ICD-10-PCS | Mod: 95,,, | Performed by: NURSE PRACTITIONER

## 2021-02-03 RX ORDER — DESVENLAFAXINE SUCCINATE 50 MG/1
50 TABLET, EXTENDED RELEASE ORAL DAILY
Qty: 30 TABLET | Refills: 1 | Status: SHIPPED | OUTPATIENT
Start: 2021-02-03 | End: 2021-05-10

## 2021-02-08 ENCOUNTER — PATIENT MESSAGE (OUTPATIENT)
Dept: PSYCHIATRY | Facility: CLINIC | Age: 31
End: 2021-02-08

## 2021-02-22 ENCOUNTER — PATIENT MESSAGE (OUTPATIENT)
Dept: PSYCHIATRY | Facility: CLINIC | Age: 31
End: 2021-02-22

## 2021-02-22 RX ORDER — HYDROXYZINE HYDROCHLORIDE 50 MG/1
50 TABLET, FILM COATED ORAL 2 TIMES DAILY PRN
Qty: 60 TABLET | Refills: 1 | Status: SHIPPED | OUTPATIENT
Start: 2021-02-22 | End: 2021-04-19 | Stop reason: SDUPTHER

## 2021-03-08 ENCOUNTER — PATIENT MESSAGE (OUTPATIENT)
Dept: PSYCHIATRY | Facility: CLINIC | Age: 31
End: 2021-03-08

## 2021-03-10 ENCOUNTER — PATIENT MESSAGE (OUTPATIENT)
Dept: PSYCHIATRY | Facility: CLINIC | Age: 31
End: 2021-03-10

## 2021-03-11 DIAGNOSIS — R11.14 BILIOUS VOMITING WITH NAUSEA: Primary | ICD-10-CM

## 2021-04-19 ENCOUNTER — PATIENT MESSAGE (OUTPATIENT)
Dept: PSYCHIATRY | Facility: CLINIC | Age: 31
End: 2021-04-19

## 2021-04-26 ENCOUNTER — PATIENT MESSAGE (OUTPATIENT)
Dept: RESEARCH | Facility: HOSPITAL | Age: 31
End: 2021-04-26

## 2021-05-10 ENCOUNTER — OFFICE VISIT (OUTPATIENT)
Dept: FAMILY MEDICINE | Facility: CLINIC | Age: 31
End: 2021-05-10
Payer: COMMERCIAL

## 2021-05-10 VITALS
BODY MASS INDEX: 19.1 KG/M2 | SYSTOLIC BLOOD PRESSURE: 92 MMHG | WEIGHT: 126 LBS | OXYGEN SATURATION: 98 % | DIASTOLIC BLOOD PRESSURE: 60 MMHG | HEIGHT: 68 IN | HEART RATE: 71 BPM

## 2021-05-10 DIAGNOSIS — F41.1 GAD (GENERALIZED ANXIETY DISORDER): Primary | ICD-10-CM

## 2021-05-10 DIAGNOSIS — R11.14 BILIOUS VOMITING WITH NAUSEA: ICD-10-CM

## 2021-05-10 PROCEDURE — 99214 OFFICE O/P EST MOD 30 MIN: CPT | Mod: S$GLB,,, | Performed by: NURSE PRACTITIONER

## 2021-05-10 PROCEDURE — 1125F PR PAIN SEVERITY QUANTIFIED, PAIN PRESENT: ICD-10-PCS | Mod: S$GLB,,, | Performed by: NURSE PRACTITIONER

## 2021-05-10 PROCEDURE — 99999 PR PBB SHADOW E&M-EST. PATIENT-LVL III: ICD-10-PCS | Mod: PBBFAC,,, | Performed by: NURSE PRACTITIONER

## 2021-05-10 PROCEDURE — 1125F AMNT PAIN NOTED PAIN PRSNT: CPT | Mod: S$GLB,,, | Performed by: NURSE PRACTITIONER

## 2021-05-10 PROCEDURE — 3008F BODY MASS INDEX DOCD: CPT | Mod: CPTII,S$GLB,, | Performed by: NURSE PRACTITIONER

## 2021-05-10 PROCEDURE — 99999 PR PBB SHADOW E&M-EST. PATIENT-LVL III: CPT | Mod: PBBFAC,,, | Performed by: NURSE PRACTITIONER

## 2021-05-10 PROCEDURE — 99214 PR OFFICE/OUTPT VISIT, EST, LEVL IV, 30-39 MIN: ICD-10-PCS | Mod: S$GLB,,, | Performed by: NURSE PRACTITIONER

## 2021-05-10 PROCEDURE — 3008F PR BODY MASS INDEX (BMI) DOCUMENTED: ICD-10-PCS | Mod: CPTII,S$GLB,, | Performed by: NURSE PRACTITIONER

## 2021-05-10 RX ORDER — BUSPIRONE HYDROCHLORIDE 5 MG/1
5 TABLET ORAL 2 TIMES DAILY
Qty: 60 TABLET | Refills: 11 | Status: SHIPPED | OUTPATIENT
Start: 2021-05-10 | End: 2021-06-11

## 2021-05-12 ENCOUNTER — LAB VISIT (OUTPATIENT)
Dept: LAB | Facility: HOSPITAL | Age: 31
End: 2021-05-12
Attending: INTERNAL MEDICINE
Payer: COMMERCIAL

## 2021-05-12 ENCOUNTER — OFFICE VISIT (OUTPATIENT)
Dept: GASTROENTEROLOGY | Facility: CLINIC | Age: 31
End: 2021-05-12
Payer: COMMERCIAL

## 2021-05-12 VITALS
DIASTOLIC BLOOD PRESSURE: 79 MMHG | HEART RATE: 58 BPM | HEIGHT: 68 IN | BODY MASS INDEX: 19.08 KG/M2 | SYSTOLIC BLOOD PRESSURE: 124 MMHG | WEIGHT: 125.88 LBS

## 2021-05-12 DIAGNOSIS — R11.14 BILIOUS VOMITING WITH NAUSEA: ICD-10-CM

## 2021-05-12 DIAGNOSIS — R11.0 NAUSEA: ICD-10-CM

## 2021-05-12 DIAGNOSIS — R63.4 WEIGHT LOSS: ICD-10-CM

## 2021-05-12 DIAGNOSIS — K21.9 GASTROESOPHAGEAL REFLUX DISEASE WITHOUT ESOPHAGITIS: Primary | ICD-10-CM

## 2021-05-12 PROCEDURE — 36415 COLL VENOUS BLD VENIPUNCTURE: CPT | Performed by: INTERNAL MEDICINE

## 2021-05-12 PROCEDURE — 83690 ASSAY OF LIPASE: CPT | Performed by: INTERNAL MEDICINE

## 2021-05-12 PROCEDURE — 99999 PR PBB SHADOW E&M-EST. PATIENT-LVL III: CPT | Mod: PBBFAC,,, | Performed by: INTERNAL MEDICINE

## 2021-05-12 PROCEDURE — 3008F BODY MASS INDEX DOCD: CPT | Mod: CPTII,S$GLB,, | Performed by: INTERNAL MEDICINE

## 2021-05-12 PROCEDURE — 99204 PR OFFICE/OUTPT VISIT, NEW, LEVL IV, 45-59 MIN: ICD-10-PCS | Mod: S$GLB,,, | Performed by: INTERNAL MEDICINE

## 2021-05-12 PROCEDURE — 99999 PR PBB SHADOW E&M-EST. PATIENT-LVL III: ICD-10-PCS | Mod: PBBFAC,,, | Performed by: INTERNAL MEDICINE

## 2021-05-12 PROCEDURE — 1125F PR PAIN SEVERITY QUANTIFIED, PAIN PRESENT: ICD-10-PCS | Mod: S$GLB,,, | Performed by: INTERNAL MEDICINE

## 2021-05-12 PROCEDURE — 1125F AMNT PAIN NOTED PAIN PRSNT: CPT | Mod: S$GLB,,, | Performed by: INTERNAL MEDICINE

## 2021-05-12 PROCEDURE — 3008F PR BODY MASS INDEX (BMI) DOCUMENTED: ICD-10-PCS | Mod: CPTII,S$GLB,, | Performed by: INTERNAL MEDICINE

## 2021-05-12 PROCEDURE — 99204 OFFICE O/P NEW MOD 45 MIN: CPT | Mod: S$GLB,,, | Performed by: INTERNAL MEDICINE

## 2021-05-12 RX ORDER — PANTOPRAZOLE SODIUM 40 MG/1
40 TABLET, DELAYED RELEASE ORAL DAILY
Qty: 30 TABLET | Refills: 11 | Status: SHIPPED | OUTPATIENT
Start: 2021-05-12 | End: 2021-08-04

## 2021-05-12 RX ORDER — ONDANSETRON 4 MG/1
4 TABLET, ORALLY DISINTEGRATING ORAL EVERY 6 HOURS PRN
Qty: 30 TABLET | Refills: 11 | Status: SHIPPED | OUTPATIENT
Start: 2021-05-12 | End: 2021-08-04

## 2021-05-14 ENCOUNTER — PATIENT MESSAGE (OUTPATIENT)
Dept: PSYCHIATRY | Facility: CLINIC | Age: 31
End: 2021-05-14

## 2021-05-14 LAB — LIPASE SERPL-CCNC: 30 U/L (ref 4–60)

## 2021-05-25 ENCOUNTER — PATIENT OUTREACH (OUTPATIENT)
Dept: ADMINISTRATIVE | Facility: HOSPITAL | Age: 31
End: 2021-05-25

## 2021-05-29 ENCOUNTER — OFFICE VISIT (OUTPATIENT)
Dept: URGENT CARE | Facility: CLINIC | Age: 31
End: 2021-05-29
Payer: COMMERCIAL

## 2021-05-29 VITALS
DIASTOLIC BLOOD PRESSURE: 88 MMHG | HEIGHT: 68 IN | BODY MASS INDEX: 18.94 KG/M2 | TEMPERATURE: 98 F | RESPIRATION RATE: 16 BRPM | OXYGEN SATURATION: 98 % | HEART RATE: 109 BPM | SYSTOLIC BLOOD PRESSURE: 125 MMHG | WEIGHT: 125 LBS

## 2021-05-29 DIAGNOSIS — Z20.2 EXPOSURE TO STD: Primary | ICD-10-CM

## 2021-05-29 DIAGNOSIS — N93.9 VAGINAL BLEEDING: ICD-10-CM

## 2021-05-29 LAB
B-HCG UR QL: NEGATIVE
BILIRUB UR QL STRIP: NEGATIVE
CTP QC/QA: YES
GLUCOSE UR QL STRIP: NEGATIVE
KETONES UR QL STRIP: NEGATIVE
LEUKOCYTE ESTERASE UR QL STRIP: NEGATIVE
PH, POC UA: 5 (ref 5–8)
POC BLOOD, URINE: POSITIVE
POC NITRATES, URINE: NEGATIVE
PROT UR QL STRIP: NEGATIVE
SP GR UR STRIP: 1.02 (ref 1–1.03)
UROBILINOGEN UR STRIP-ACNC: NORMAL (ref 0.1–1.1)

## 2021-05-29 PROCEDURE — 87591 N.GONORRHOEAE DNA AMP PROB: CPT | Mod: 59 | Performed by: PHYSICIAN ASSISTANT

## 2021-05-29 PROCEDURE — 1126F PR PAIN SEVERITY QUANTIFIED, NO PAIN PRESENT: ICD-10-PCS | Mod: S$GLB,,, | Performed by: PHYSICIAN ASSISTANT

## 2021-05-29 PROCEDURE — 86592 SYPHILIS TEST NON-TREP QUAL: CPT | Performed by: PHYSICIAN ASSISTANT

## 2021-05-29 PROCEDURE — 3008F BODY MASS INDEX DOCD: CPT | Mod: CPTII,S$GLB,, | Performed by: PHYSICIAN ASSISTANT

## 2021-05-29 PROCEDURE — 87491 CHLMYD TRACH DNA AMP PROBE: CPT | Mod: 59 | Performed by: PHYSICIAN ASSISTANT

## 2021-05-29 PROCEDURE — 87481 CANDIDA DNA AMP PROBE: CPT | Mod: 59 | Performed by: PHYSICIAN ASSISTANT

## 2021-05-29 PROCEDURE — 99214 PR OFFICE/OUTPT VISIT, EST, LEVL IV, 30-39 MIN: ICD-10-PCS | Mod: S$GLB,,, | Performed by: PHYSICIAN ASSISTANT

## 2021-05-29 PROCEDURE — 86703 HIV-1/HIV-2 1 RESULT ANTBDY: CPT | Performed by: PHYSICIAN ASSISTANT

## 2021-05-29 PROCEDURE — 87661 TRICHOMONAS VAGINALIS AMPLIF: CPT | Performed by: PHYSICIAN ASSISTANT

## 2021-05-29 PROCEDURE — 3008F PR BODY MASS INDEX (BMI) DOCUMENTED: ICD-10-PCS | Mod: CPTII,S$GLB,, | Performed by: PHYSICIAN ASSISTANT

## 2021-05-29 PROCEDURE — 99214 OFFICE O/P EST MOD 30 MIN: CPT | Mod: S$GLB,,, | Performed by: PHYSICIAN ASSISTANT

## 2021-05-29 PROCEDURE — 1126F AMNT PAIN NOTED NONE PRSNT: CPT | Mod: S$GLB,,, | Performed by: PHYSICIAN ASSISTANT

## 2021-05-29 PROCEDURE — 80074 ACUTE HEPATITIS PANEL: CPT | Performed by: PHYSICIAN ASSISTANT

## 2021-05-29 RX ORDER — DOXYCYCLINE 100 MG/1
100 CAPSULE ORAL 2 TIMES DAILY
Qty: 14 CAPSULE | Refills: 0 | Status: SHIPPED | OUTPATIENT
Start: 2021-05-29 | End: 2021-06-05

## 2021-05-31 LAB
BACTERIAL VAGINOSIS DNA: POSITIVE
CANDIDA GLABRATA DNA: NEGATIVE
CANDIDA KRUSEI DNA: NEGATIVE
CANDIDA RRNA VAG QL PROBE: NEGATIVE
HAV IGM SERPL QL IA: NEGATIVE
HBV CORE IGM SERPL QL IA: NEGATIVE
HBV SURFACE AG SERPL QL IA: NEGATIVE
HCV AB SERPL QL IA: NEGATIVE
HIV 1+2 AB+HIV1 P24 AG SERPL QL IA: NEGATIVE
RPR SER QL: NORMAL
T VAGINALIS RRNA GENITAL QL PROBE: NEGATIVE

## 2021-06-02 ENCOUNTER — TELEPHONE (OUTPATIENT)
Dept: URGENT CARE | Facility: CLINIC | Age: 31
End: 2021-06-02

## 2021-06-02 DIAGNOSIS — B96.89 BV (BACTERIAL VAGINOSIS): Primary | ICD-10-CM

## 2021-06-02 DIAGNOSIS — N76.0 BV (BACTERIAL VAGINOSIS): Primary | ICD-10-CM

## 2021-06-02 LAB
C TRACH DNA SPEC QL NAA+PROBE: NOT DETECTED
N GONORRHOEA DNA SPEC QL NAA+PROBE: NOT DETECTED

## 2021-06-02 RX ORDER — METRONIDAZOLE 7.5 MG/G
1 GEL VAGINAL DAILY
Qty: 5 APPLICATOR | Refills: 0 | Status: SHIPPED | OUTPATIENT
Start: 2021-06-02 | End: 2021-08-04

## 2021-06-11 ENCOUNTER — OFFICE VISIT (OUTPATIENT)
Dept: FAMILY MEDICINE | Facility: CLINIC | Age: 31
End: 2021-06-11
Payer: COMMERCIAL

## 2021-06-11 VITALS
SYSTOLIC BLOOD PRESSURE: 102 MMHG | WEIGHT: 118 LBS | BODY MASS INDEX: 18.96 KG/M2 | DIASTOLIC BLOOD PRESSURE: 78 MMHG | HEART RATE: 61 BPM | OXYGEN SATURATION: 98 % | HEIGHT: 66 IN

## 2021-06-11 DIAGNOSIS — Z01.419 ENCOUNTER FOR GYNECOLOGICAL EXAMINATION WITH PAPANICOLAOU SMEAR OF CERVIX: Primary | ICD-10-CM

## 2021-06-11 PROCEDURE — 3008F PR BODY MASS INDEX (BMI) DOCUMENTED: ICD-10-PCS | Mod: CPTII,S$GLB,, | Performed by: NURSE PRACTITIONER

## 2021-06-11 PROCEDURE — 99395 PREV VISIT EST AGE 18-39: CPT | Mod: S$GLB,,, | Performed by: NURSE PRACTITIONER

## 2021-06-11 PROCEDURE — 99395 PR PREVENTIVE VISIT,EST,18-39: ICD-10-PCS | Mod: S$GLB,,, | Performed by: NURSE PRACTITIONER

## 2021-06-11 PROCEDURE — 1126F AMNT PAIN NOTED NONE PRSNT: CPT | Mod: S$GLB,,, | Performed by: NURSE PRACTITIONER

## 2021-06-11 PROCEDURE — 99999 PR PBB SHADOW E&M-EST. PATIENT-LVL III: ICD-10-PCS | Mod: PBBFAC,,, | Performed by: NURSE PRACTITIONER

## 2021-06-11 PROCEDURE — 3008F BODY MASS INDEX DOCD: CPT | Mod: CPTII,S$GLB,, | Performed by: NURSE PRACTITIONER

## 2021-06-11 PROCEDURE — 88175 CYTOPATH C/V AUTO FLUID REDO: CPT | Performed by: NURSE PRACTITIONER

## 2021-06-11 PROCEDURE — 99999 PR PBB SHADOW E&M-EST. PATIENT-LVL III: CPT | Mod: PBBFAC,,, | Performed by: NURSE PRACTITIONER

## 2021-06-11 PROCEDURE — 1126F PR PAIN SEVERITY QUANTIFIED, NO PAIN PRESENT: ICD-10-PCS | Mod: S$GLB,,, | Performed by: NURSE PRACTITIONER

## 2021-06-18 ENCOUNTER — PATIENT MESSAGE (OUTPATIENT)
Dept: FAMILY MEDICINE | Facility: CLINIC | Age: 31
End: 2021-06-18

## 2021-06-18 LAB
FINAL PATHOLOGIC DIAGNOSIS: NORMAL
Lab: NORMAL

## 2021-07-04 ENCOUNTER — PATIENT MESSAGE (OUTPATIENT)
Dept: GASTROENTEROLOGY | Facility: CLINIC | Age: 31
End: 2021-07-04

## 2021-07-06 ENCOUNTER — TELEPHONE (OUTPATIENT)
Dept: ENDOSCOPY | Facility: HOSPITAL | Age: 31
End: 2021-07-06

## 2021-07-06 DIAGNOSIS — Z12.11 SPECIAL SCREENING FOR MALIGNANT NEOPLASMS, COLON: Primary | ICD-10-CM

## 2021-07-06 RX ORDER — SODIUM, POTASSIUM,MAG SULFATES 17.5-3.13G
SOLUTION, RECONSTITUTED, ORAL ORAL
Qty: 1 BOTTLE | Refills: 0 | Status: SHIPPED | OUTPATIENT
Start: 2021-07-06 | End: 2021-11-04

## 2021-07-15 ENCOUNTER — PATIENT MESSAGE (OUTPATIENT)
Dept: INTERNAL MEDICINE | Facility: CLINIC | Age: 31
End: 2021-07-15

## 2021-07-22 ENCOUNTER — CLINICAL SUPPORT (OUTPATIENT)
Dept: URGENT CARE | Facility: CLINIC | Age: 31
End: 2021-07-22
Payer: COMMERCIAL

## 2021-07-22 DIAGNOSIS — Z11.59 ENCOUNTER FOR SCREENING FOR OTHER VIRAL DISEASES: Primary | ICD-10-CM

## 2021-07-22 LAB
CTP QC/QA: YES
SARS-COV-2 RDRP RESP QL NAA+PROBE: NEGATIVE

## 2021-07-22 PROCEDURE — U0002 COVID-19 LAB TEST NON-CDC: HCPCS | Mod: QW,S$GLB,, | Performed by: INTERNAL MEDICINE

## 2021-07-22 PROCEDURE — U0002: ICD-10-PCS | Mod: QW,S$GLB,, | Performed by: INTERNAL MEDICINE

## 2021-07-29 ENCOUNTER — ANESTHESIA EVENT (OUTPATIENT)
Dept: ENDOSCOPY | Facility: HOSPITAL | Age: 31
End: 2021-07-29
Payer: COMMERCIAL

## 2021-07-29 ENCOUNTER — ANESTHESIA (OUTPATIENT)
Dept: ENDOSCOPY | Facility: HOSPITAL | Age: 31
End: 2021-07-29
Payer: COMMERCIAL

## 2021-07-29 ENCOUNTER — HOSPITAL ENCOUNTER (OUTPATIENT)
Facility: HOSPITAL | Age: 31
Discharge: HOME | End: 2021-07-29
Attending: INTERNAL MEDICINE | Admitting: INTERNAL MEDICINE
Payer: COMMERCIAL

## 2021-07-29 VITALS
DIASTOLIC BLOOD PRESSURE: 90 MMHG | HEIGHT: 68 IN | RESPIRATION RATE: 17 BRPM | BODY MASS INDEX: 17.88 KG/M2 | SYSTOLIC BLOOD PRESSURE: 138 MMHG | WEIGHT: 118 LBS | HEART RATE: 66 BPM | OXYGEN SATURATION: 100 % | TEMPERATURE: 98 F

## 2021-07-29 DIAGNOSIS — R11.14 BILIOUS VOMITING WITH NAUSEA: Primary | ICD-10-CM

## 2021-07-29 DIAGNOSIS — R10.9 ABDOMINAL PAIN, UNSPECIFIED ABDOMINAL LOCATION: ICD-10-CM

## 2021-07-29 DIAGNOSIS — R10.9 ABDOMINAL PAIN: ICD-10-CM

## 2021-07-29 LAB
B-HCG UR QL: NEGATIVE
CTP QC/QA: YES

## 2021-07-29 PROCEDURE — 45378 DIAGNOSTIC COLONOSCOPY: CPT | Performed by: INTERNAL MEDICINE

## 2021-07-29 PROCEDURE — 37000009 HC ANESTHESIA EA ADD 15 MINS: Performed by: INTERNAL MEDICINE

## 2021-07-29 PROCEDURE — 43239 EGD BIOPSY SINGLE/MULTIPLE: CPT | Mod: 51,,, | Performed by: INTERNAL MEDICINE

## 2021-07-29 PROCEDURE — 25000003 PHARM REV CODE 250: Performed by: NURSE ANESTHETIST, CERTIFIED REGISTERED

## 2021-07-29 PROCEDURE — 27201012 HC FORCEPS, HOT/COLD, DISP: Performed by: INTERNAL MEDICINE

## 2021-07-29 PROCEDURE — 43239 EGD BIOPSY SINGLE/MULTIPLE: CPT | Performed by: INTERNAL MEDICINE

## 2021-07-29 PROCEDURE — 81025 URINE PREGNANCY TEST: CPT | Performed by: INTERNAL MEDICINE

## 2021-07-29 PROCEDURE — E9220 PRA ENDO ANESTHESIA: HCPCS | Mod: ,,, | Performed by: NURSE ANESTHETIST, CERTIFIED REGISTERED

## 2021-07-29 PROCEDURE — 63600175 PHARM REV CODE 636 W HCPCS: Performed by: NURSE ANESTHETIST, CERTIFIED REGISTERED

## 2021-07-29 PROCEDURE — E9220 PRA ENDO ANESTHESIA: ICD-10-PCS | Mod: ,,, | Performed by: NURSE ANESTHETIST, CERTIFIED REGISTERED

## 2021-07-29 PROCEDURE — 25000003 PHARM REV CODE 250: Performed by: INTERNAL MEDICINE

## 2021-07-29 PROCEDURE — 45378 DIAGNOSTIC COLONOSCOPY: CPT | Mod: ,,, | Performed by: INTERNAL MEDICINE

## 2021-07-29 PROCEDURE — 88305 TISSUE EXAM BY PATHOLOGIST: ICD-10-PCS | Mod: 26,,, | Performed by: PATHOLOGY

## 2021-07-29 PROCEDURE — 88305 TISSUE EXAM BY PATHOLOGIST: CPT | Mod: 26,,, | Performed by: PATHOLOGY

## 2021-07-29 PROCEDURE — 88305 TISSUE EXAM BY PATHOLOGIST: CPT | Mod: 59 | Performed by: PATHOLOGY

## 2021-07-29 PROCEDURE — 88342 CHG IMMUNOCYTOCHEMISTRY: ICD-10-PCS | Mod: 26,,, | Performed by: PATHOLOGY

## 2021-07-29 PROCEDURE — 88342 IMHCHEM/IMCYTCHM 1ST ANTB: CPT | Performed by: PATHOLOGY

## 2021-07-29 PROCEDURE — 88342 IMHCHEM/IMCYTCHM 1ST ANTB: CPT | Mod: 26,,, | Performed by: PATHOLOGY

## 2021-07-29 PROCEDURE — 43239 PR EGD, FLEX, W/BIOPSY, SGL/MULTI: ICD-10-PCS | Mod: 51,,, | Performed by: INTERNAL MEDICINE

## 2021-07-29 PROCEDURE — 37000008 HC ANESTHESIA 1ST 15 MINUTES: Performed by: INTERNAL MEDICINE

## 2021-07-29 PROCEDURE — 45378 PR COLONOSCOPY,DIAGNOSTIC: ICD-10-PCS | Mod: ,,, | Performed by: INTERNAL MEDICINE

## 2021-07-29 RX ORDER — SODIUM CHLORIDE 9 MG/ML
INJECTION, SOLUTION INTRAVENOUS CONTINUOUS
Status: DISCONTINUED | OUTPATIENT
Start: 2021-07-29 | End: 2021-07-29 | Stop reason: HOSPADM

## 2021-07-29 RX ORDER — PROPOFOL 10 MG/ML
VIAL (ML) INTRAVENOUS CONTINUOUS PRN
Status: DISCONTINUED | OUTPATIENT
Start: 2021-07-29 | End: 2021-07-29

## 2021-07-29 RX ORDER — PROPOFOL 10 MG/ML
VIAL (ML) INTRAVENOUS
Status: DISCONTINUED | OUTPATIENT
Start: 2021-07-29 | End: 2021-07-29

## 2021-07-29 RX ORDER — LIDOCAINE HYDROCHLORIDE 20 MG/ML
INJECTION INTRAVENOUS
Status: DISCONTINUED | OUTPATIENT
Start: 2021-07-29 | End: 2021-07-29

## 2021-07-29 RX ORDER — PHENYLEPHRINE HYDROCHLORIDE 10 MG/ML
INJECTION INTRAVENOUS
Status: DISCONTINUED | OUTPATIENT
Start: 2021-07-29 | End: 2021-07-29

## 2021-07-29 RX ADMIN — Medication 200 MCG/KG/MIN: at 02:07

## 2021-07-29 RX ADMIN — LIDOCAINE HYDROCHLORIDE 100 MG: 20 INJECTION, SOLUTION INTRAVENOUS at 02:07

## 2021-07-29 RX ADMIN — GLYCOPYRROLATE 0.1 MG: 0.2 INJECTION, SOLUTION INTRAMUSCULAR; INTRAVITREAL at 02:07

## 2021-07-29 RX ADMIN — PHENYLEPHRINE HYDROCHLORIDE 100 MCG: 10 INJECTION INTRAVENOUS at 03:07

## 2021-07-29 RX ADMIN — PROPOFOL 100 MG: 10 INJECTION, EMULSION INTRAVENOUS at 02:07

## 2021-07-29 RX ADMIN — SODIUM CHLORIDE: 0.9 INJECTION, SOLUTION INTRAVENOUS at 02:07

## 2021-08-04 ENCOUNTER — OFFICE VISIT (OUTPATIENT)
Dept: DERMATOLOGY | Facility: CLINIC | Age: 31
End: 2021-08-04
Payer: COMMERCIAL

## 2021-08-04 DIAGNOSIS — L85.8 KP (KERATOSIS PILARIS): Primary | ICD-10-CM

## 2021-08-04 DIAGNOSIS — L70.0 ACNE VULGARIS: ICD-10-CM

## 2021-08-04 DIAGNOSIS — L73.9 FOLLICULITIS: ICD-10-CM

## 2021-08-04 DIAGNOSIS — Z76.89 ENCOUNTER FOR SKIN CARE: ICD-10-CM

## 2021-08-04 PROCEDURE — 99999 PR PBB SHADOW E&M-EST. PATIENT-LVL II: CPT | Mod: PBBFAC,,, | Performed by: DERMATOLOGY

## 2021-08-04 PROCEDURE — 1126F PR PAIN SEVERITY QUANTIFIED, NO PAIN PRESENT: ICD-10-PCS | Mod: CPTII,S$GLB,, | Performed by: DERMATOLOGY

## 2021-08-04 PROCEDURE — 1160F PR REVIEW ALL MEDS BY PRESCRIBER/CLIN PHARMACIST DOCUMENTED: ICD-10-PCS | Mod: CPTII,S$GLB,, | Performed by: DERMATOLOGY

## 2021-08-04 PROCEDURE — 99999 PR PBB SHADOW E&M-EST. PATIENT-LVL II: ICD-10-PCS | Mod: PBBFAC,,, | Performed by: DERMATOLOGY

## 2021-08-04 PROCEDURE — 99204 PR OFFICE/OUTPT VISIT, NEW, LEVL IV, 45-59 MIN: ICD-10-PCS | Mod: S$GLB,,, | Performed by: DERMATOLOGY

## 2021-08-04 PROCEDURE — 1159F PR MEDICATION LIST DOCUMENTED IN MEDICAL RECORD: ICD-10-PCS | Mod: CPTII,S$GLB,, | Performed by: DERMATOLOGY

## 2021-08-04 PROCEDURE — 1160F RVW MEDS BY RX/DR IN RCRD: CPT | Mod: CPTII,S$GLB,, | Performed by: DERMATOLOGY

## 2021-08-04 PROCEDURE — 99204 OFFICE O/P NEW MOD 45 MIN: CPT | Mod: S$GLB,,, | Performed by: DERMATOLOGY

## 2021-08-04 PROCEDURE — 1126F AMNT PAIN NOTED NONE PRSNT: CPT | Mod: CPTII,S$GLB,, | Performed by: DERMATOLOGY

## 2021-08-04 PROCEDURE — 1159F MED LIST DOCD IN RCRD: CPT | Mod: CPTII,S$GLB,, | Performed by: DERMATOLOGY

## 2021-08-04 RX ORDER — ERYTHROMYCIN 20 MG/G
GEL TOPICAL 2 TIMES DAILY
Qty: 60 G | Refills: 0 | Status: SHIPPED | OUTPATIENT
Start: 2021-08-04 | End: 2021-12-04

## 2021-08-05 LAB
FINAL PATHOLOGIC DIAGNOSIS: NORMAL
Lab: NORMAL

## 2021-09-08 ENCOUNTER — PATIENT MESSAGE (OUTPATIENT)
Dept: PSYCHIATRY | Facility: CLINIC | Age: 31
End: 2021-09-08

## 2021-09-08 ENCOUNTER — OFFICE VISIT (OUTPATIENT)
Dept: PSYCHIATRY | Facility: CLINIC | Age: 31
End: 2021-09-08
Payer: COMMERCIAL

## 2021-09-08 DIAGNOSIS — F41.1 GAD (GENERALIZED ANXIETY DISORDER): Primary | ICD-10-CM

## 2021-09-08 PROCEDURE — 99499 NO LOS: ICD-10-PCS | Mod: 95,,, | Performed by: NURSE PRACTITIONER

## 2021-09-08 PROCEDURE — 99499 UNLISTED E&M SERVICE: CPT | Mod: 95,,, | Performed by: NURSE PRACTITIONER

## 2021-09-09 ENCOUNTER — OFFICE VISIT (OUTPATIENT)
Dept: PSYCHIATRY | Facility: CLINIC | Age: 31
End: 2021-09-09
Payer: COMMERCIAL

## 2021-09-09 ENCOUNTER — PATIENT MESSAGE (OUTPATIENT)
Dept: PSYCHIATRY | Facility: CLINIC | Age: 31
End: 2021-09-09

## 2021-09-09 DIAGNOSIS — F33.2 SEVERE EPISODE OF RECURRENT MAJOR DEPRESSIVE DISORDER, WITHOUT PSYCHOTIC FEATURES: ICD-10-CM

## 2021-09-09 DIAGNOSIS — R11.14 BILIOUS VOMITING WITH NAUSEA: ICD-10-CM

## 2021-09-09 DIAGNOSIS — F12.90 MARIJUANA USE, CONTINUOUS: ICD-10-CM

## 2021-09-09 DIAGNOSIS — F41.1 GAD (GENERALIZED ANXIETY DISORDER): Primary | ICD-10-CM

## 2021-09-09 DIAGNOSIS — F41.0 PANIC ATTACK: ICD-10-CM

## 2021-09-09 PROCEDURE — 1159F PR MEDICATION LIST DOCUMENTED IN MEDICAL RECORD: ICD-10-PCS | Mod: CPTII,95,, | Performed by: NURSE PRACTITIONER

## 2021-09-09 PROCEDURE — 1160F PR REVIEW ALL MEDS BY PRESCRIBER/CLIN PHARMACIST DOCUMENTED: ICD-10-PCS | Mod: CPTII,95,, | Performed by: NURSE PRACTITIONER

## 2021-09-09 PROCEDURE — 99214 PR OFFICE/OUTPT VISIT, EST, LEVL IV, 30-39 MIN: ICD-10-PCS | Mod: 95,,, | Performed by: NURSE PRACTITIONER

## 2021-09-09 PROCEDURE — 1160F RVW MEDS BY RX/DR IN RCRD: CPT | Mod: CPTII,95,, | Performed by: NURSE PRACTITIONER

## 2021-09-09 PROCEDURE — 90833 PR PSYCHOTHERAPY W/PATIENT W/E&M, 30 MIN (ADD ON): ICD-10-PCS | Mod: 95,,, | Performed by: NURSE PRACTITIONER

## 2021-09-09 PROCEDURE — 1159F MED LIST DOCD IN RCRD: CPT | Mod: CPTII,95,, | Performed by: NURSE PRACTITIONER

## 2021-09-09 PROCEDURE — 90833 PSYTX W PT W E/M 30 MIN: CPT | Mod: 95,,, | Performed by: NURSE PRACTITIONER

## 2021-09-09 PROCEDURE — 99214 OFFICE O/P EST MOD 30 MIN: CPT | Mod: 95,,, | Performed by: NURSE PRACTITIONER

## 2021-09-09 RX ORDER — CLONAZEPAM 0.5 MG/1
0.5 TABLET ORAL 2 TIMES DAILY PRN
Qty: 30 TABLET | Refills: 0 | Status: SHIPPED | OUTPATIENT
Start: 2021-09-09 | End: 2021-09-27 | Stop reason: SDUPTHER

## 2021-09-27 ENCOUNTER — OFFICE VISIT (OUTPATIENT)
Dept: PSYCHIATRY | Facility: CLINIC | Age: 31
End: 2021-09-27
Payer: COMMERCIAL

## 2021-09-27 DIAGNOSIS — F41.1 GAD (GENERALIZED ANXIETY DISORDER): Primary | ICD-10-CM

## 2021-09-27 DIAGNOSIS — F12.90 MARIJUANA USE, CONTINUOUS: ICD-10-CM

## 2021-09-27 DIAGNOSIS — F33.2 SEVERE EPISODE OF RECURRENT MAJOR DEPRESSIVE DISORDER, WITHOUT PSYCHOTIC FEATURES: ICD-10-CM

## 2021-09-27 DIAGNOSIS — F41.0 PANIC ATTACK: ICD-10-CM

## 2021-09-27 DIAGNOSIS — R11.14 BILIOUS VOMITING WITH NAUSEA: ICD-10-CM

## 2021-09-27 PROCEDURE — 1160F RVW MEDS BY RX/DR IN RCRD: CPT | Mod: CPTII,95,, | Performed by: NURSE PRACTITIONER

## 2021-09-27 PROCEDURE — 1160F PR REVIEW ALL MEDS BY PRESCRIBER/CLIN PHARMACIST DOCUMENTED: ICD-10-PCS | Mod: CPTII,95,, | Performed by: NURSE PRACTITIONER

## 2021-09-27 PROCEDURE — 99214 OFFICE O/P EST MOD 30 MIN: CPT | Mod: 95,,, | Performed by: NURSE PRACTITIONER

## 2021-09-27 PROCEDURE — 1159F PR MEDICATION LIST DOCUMENTED IN MEDICAL RECORD: ICD-10-PCS | Mod: CPTII,95,, | Performed by: NURSE PRACTITIONER

## 2021-09-27 PROCEDURE — 90833 PR PSYCHOTHERAPY W/PATIENT W/E&M, 30 MIN (ADD ON): ICD-10-PCS | Mod: 95,,, | Performed by: NURSE PRACTITIONER

## 2021-09-27 PROCEDURE — 1159F MED LIST DOCD IN RCRD: CPT | Mod: CPTII,95,, | Performed by: NURSE PRACTITIONER

## 2021-09-27 PROCEDURE — 99214 PR OFFICE/OUTPT VISIT, EST, LEVL IV, 30-39 MIN: ICD-10-PCS | Mod: 95,,, | Performed by: NURSE PRACTITIONER

## 2021-09-27 PROCEDURE — 90833 PSYTX W PT W E/M 30 MIN: CPT | Mod: 95,,, | Performed by: NURSE PRACTITIONER

## 2021-09-27 RX ORDER — CLONAZEPAM 0.5 MG/1
0.5 TABLET ORAL 2 TIMES DAILY PRN
Qty: 60 TABLET | Refills: 0 | Status: SHIPPED | OUTPATIENT
Start: 2021-09-27 | End: 2021-11-08 | Stop reason: SDUPTHER

## 2021-09-28 ENCOUNTER — PATIENT MESSAGE (OUTPATIENT)
Dept: PSYCHIATRY | Facility: CLINIC | Age: 31
End: 2021-09-28

## 2021-09-28 ENCOUNTER — TELEPHONE (OUTPATIENT)
Dept: PSYCHIATRY | Facility: CLINIC | Age: 31
End: 2021-09-28

## 2021-10-19 ENCOUNTER — OFFICE VISIT (OUTPATIENT)
Dept: PSYCHIATRY | Facility: CLINIC | Age: 31
End: 2021-10-19
Payer: COMMERCIAL

## 2021-10-19 ENCOUNTER — PATIENT MESSAGE (OUTPATIENT)
Dept: PSYCHIATRY | Facility: CLINIC | Age: 31
End: 2021-10-19
Payer: COMMERCIAL

## 2021-10-19 DIAGNOSIS — F43.10 PTSD (POST-TRAUMATIC STRESS DISORDER): ICD-10-CM

## 2021-10-19 DIAGNOSIS — F41.1 GAD (GENERALIZED ANXIETY DISORDER): Primary | ICD-10-CM

## 2021-10-19 PROCEDURE — 90791 PSYCH DIAGNOSTIC EVALUATION: CPT | Mod: S$GLB,,, | Performed by: PSYCHOLOGIST

## 2021-10-19 PROCEDURE — 90791 PR PSYCHIATRIC DIAGNOSTIC EVALUATION: ICD-10-PCS | Mod: S$GLB,,, | Performed by: PSYCHOLOGIST

## 2021-11-04 ENCOUNTER — OFFICE VISIT (OUTPATIENT)
Dept: OBSTETRICS AND GYNECOLOGY | Facility: CLINIC | Age: 31
End: 2021-11-04
Payer: COMMERCIAL

## 2021-11-04 ENCOUNTER — LAB VISIT (OUTPATIENT)
Dept: LAB | Facility: OTHER | Age: 31
End: 2021-11-04
Attending: OBSTETRICS & GYNECOLOGY
Payer: COMMERCIAL

## 2021-11-04 ENCOUNTER — PATIENT MESSAGE (OUTPATIENT)
Dept: PSYCHIATRY | Facility: CLINIC | Age: 31
End: 2021-11-04
Payer: COMMERCIAL

## 2021-11-04 VITALS
WEIGHT: 105.81 LBS | HEIGHT: 68 IN | SYSTOLIC BLOOD PRESSURE: 120 MMHG | DIASTOLIC BLOOD PRESSURE: 68 MMHG | BODY MASS INDEX: 16.03 KG/M2

## 2021-11-04 DIAGNOSIS — Z11.3 SCREEN FOR STD (SEXUALLY TRANSMITTED DISEASE): ICD-10-CM

## 2021-11-04 DIAGNOSIS — Z30.431 ENCOUNTER FOR MANAGEMENT OF INTRAUTERINE CONTRACEPTIVE DEVICE (IUD), UNSPECIFIED IUD MANAGEMENT TYPE: Primary | ICD-10-CM

## 2021-11-04 PROCEDURE — 86803 HEPATITIS C AB TEST: CPT | Performed by: OBSTETRICS & GYNECOLOGY

## 2021-11-04 PROCEDURE — 3008F PR BODY MASS INDEX (BMI) DOCUMENTED: ICD-10-PCS | Mod: CPTII,S$GLB,, | Performed by: OBSTETRICS & GYNECOLOGY

## 2021-11-04 PROCEDURE — 3078F DIAST BP <80 MM HG: CPT | Mod: CPTII,S$GLB,, | Performed by: OBSTETRICS & GYNECOLOGY

## 2021-11-04 PROCEDURE — 87481 CANDIDA DNA AMP PROBE: CPT | Mod: 59 | Performed by: OBSTETRICS & GYNECOLOGY

## 2021-11-04 PROCEDURE — 99203 OFFICE O/P NEW LOW 30 MIN: CPT | Mod: S$GLB,,, | Performed by: OBSTETRICS & GYNECOLOGY

## 2021-11-04 PROCEDURE — 3008F BODY MASS INDEX DOCD: CPT | Mod: CPTII,S$GLB,, | Performed by: OBSTETRICS & GYNECOLOGY

## 2021-11-04 PROCEDURE — 87389 HIV-1 AG W/HIV-1&-2 AB AG IA: CPT | Performed by: OBSTETRICS & GYNECOLOGY

## 2021-11-04 PROCEDURE — 36415 COLL VENOUS BLD VENIPUNCTURE: CPT | Performed by: OBSTETRICS & GYNECOLOGY

## 2021-11-04 PROCEDURE — 99999 PR PBB SHADOW E&M-EST. PATIENT-LVL II: ICD-10-PCS | Mod: PBBFAC,,, | Performed by: OBSTETRICS & GYNECOLOGY

## 2021-11-04 PROCEDURE — 99203 PR OFFICE/OUTPT VISIT, NEW, LEVL III, 30-44 MIN: ICD-10-PCS | Mod: S$GLB,,, | Performed by: OBSTETRICS & GYNECOLOGY

## 2021-11-04 PROCEDURE — 87491 CHLMYD TRACH DNA AMP PROBE: CPT | Performed by: OBSTETRICS & GYNECOLOGY

## 2021-11-04 PROCEDURE — 86592 SYPHILIS TEST NON-TREP QUAL: CPT | Performed by: OBSTETRICS & GYNECOLOGY

## 2021-11-04 PROCEDURE — 3074F SYST BP LT 130 MM HG: CPT | Mod: CPTII,S$GLB,, | Performed by: OBSTETRICS & GYNECOLOGY

## 2021-11-04 PROCEDURE — 99999 PR PBB SHADOW E&M-EST. PATIENT-LVL II: CPT | Mod: PBBFAC,,, | Performed by: OBSTETRICS & GYNECOLOGY

## 2021-11-04 PROCEDURE — 3074F PR MOST RECENT SYSTOLIC BLOOD PRESSURE < 130 MM HG: ICD-10-PCS | Mod: CPTII,S$GLB,, | Performed by: OBSTETRICS & GYNECOLOGY

## 2021-11-04 PROCEDURE — 87591 N.GONORRHOEAE DNA AMP PROB: CPT | Performed by: OBSTETRICS & GYNECOLOGY

## 2021-11-04 PROCEDURE — 3078F PR MOST RECENT DIASTOLIC BLOOD PRESSURE < 80 MM HG: ICD-10-PCS | Mod: CPTII,S$GLB,, | Performed by: OBSTETRICS & GYNECOLOGY

## 2021-11-04 PROCEDURE — 87340 HEPATITIS B SURFACE AG IA: CPT | Performed by: OBSTETRICS & GYNECOLOGY

## 2021-11-04 RX ORDER — MISOPROSTOL 200 UG/1
TABLET ORAL
Qty: 3 TABLET | Refills: 0 | Status: ON HOLD | OUTPATIENT
Start: 2021-11-04 | End: 2021-12-29

## 2021-11-05 LAB
HBV SURFACE AG SERPL QL IA: NEGATIVE
HCV AB SERPL QL IA: NEGATIVE
HIV 1+2 AB+HIV1 P24 AG SERPL QL IA: NEGATIVE
RPR SER QL: NORMAL

## 2021-11-08 ENCOUNTER — OFFICE VISIT (OUTPATIENT)
Dept: PSYCHIATRY | Facility: CLINIC | Age: 31
End: 2021-11-08
Payer: COMMERCIAL

## 2021-11-08 ENCOUNTER — TELEPHONE (OUTPATIENT)
Dept: OPHTHALMOLOGY | Facility: CLINIC | Age: 31
End: 2021-11-08
Payer: COMMERCIAL

## 2021-11-08 ENCOUNTER — TELEPHONE (OUTPATIENT)
Dept: OBSTETRICS AND GYNECOLOGY | Facility: CLINIC | Age: 31
End: 2021-11-08
Payer: COMMERCIAL

## 2021-11-08 DIAGNOSIS — Z30.014 ENCOUNTER FOR INITIAL PRESCRIPTION OF INTRAUTERINE CONTRACEPTIVE DEVICE (IUD): Primary | ICD-10-CM

## 2021-11-08 DIAGNOSIS — F41.0 PANIC ATTACK: ICD-10-CM

## 2021-11-08 DIAGNOSIS — F41.1 GAD (GENERALIZED ANXIETY DISORDER): ICD-10-CM

## 2021-11-08 DIAGNOSIS — F43.10 PTSD (POST-TRAUMATIC STRESS DISORDER): Primary | ICD-10-CM

## 2021-11-08 DIAGNOSIS — R11.14 BILIOUS VOMITING WITH NAUSEA: ICD-10-CM

## 2021-11-08 DIAGNOSIS — F33.2 SEVERE EPISODE OF RECURRENT MAJOR DEPRESSIVE DISORDER, WITHOUT PSYCHOTIC FEATURES: ICD-10-CM

## 2021-11-08 DIAGNOSIS — F32.A DEPRESSION, UNSPECIFIED DEPRESSION TYPE: Primary | ICD-10-CM

## 2021-11-08 DIAGNOSIS — F12.90 MARIJUANA USE, CONTINUOUS: ICD-10-CM

## 2021-11-08 PROCEDURE — 1159F PR MEDICATION LIST DOCUMENTED IN MEDICAL RECORD: ICD-10-PCS | Mod: CPTII,95,, | Performed by: SOCIAL WORKER

## 2021-11-08 PROCEDURE — 90832 PSYTX W PT 30 MINUTES: CPT | Mod: 95,,, | Performed by: SOCIAL WORKER

## 2021-11-08 PROCEDURE — 1159F MED LIST DOCD IN RCRD: CPT | Mod: CPTII,95,, | Performed by: NURSE PRACTITIONER

## 2021-11-08 PROCEDURE — 99214 PR OFFICE/OUTPT VISIT, EST, LEVL IV, 30-39 MIN: ICD-10-PCS | Mod: 95,,, | Performed by: NURSE PRACTITIONER

## 2021-11-08 PROCEDURE — 1159F PR MEDICATION LIST DOCUMENTED IN MEDICAL RECORD: ICD-10-PCS | Mod: CPTII,95,, | Performed by: NURSE PRACTITIONER

## 2021-11-08 PROCEDURE — 99214 OFFICE O/P EST MOD 30 MIN: CPT | Mod: 95,,, | Performed by: NURSE PRACTITIONER

## 2021-11-08 PROCEDURE — 90832 PR PSYCHOTHERAPY W/PATIENT, 30 MIN: ICD-10-PCS | Mod: 95,,, | Performed by: SOCIAL WORKER

## 2021-11-08 PROCEDURE — 1160F RVW MEDS BY RX/DR IN RCRD: CPT | Mod: CPTII,95,, | Performed by: NURSE PRACTITIONER

## 2021-11-08 PROCEDURE — 1159F MED LIST DOCD IN RCRD: CPT | Mod: CPTII,95,, | Performed by: SOCIAL WORKER

## 2021-11-08 PROCEDURE — 1160F PR REVIEW ALL MEDS BY PRESCRIBER/CLIN PHARMACIST DOCUMENTED: ICD-10-PCS | Mod: CPTII,95,, | Performed by: NURSE PRACTITIONER

## 2021-11-08 RX ORDER — CLONAZEPAM 0.5 MG/1
0.5 TABLET ORAL 2 TIMES DAILY PRN
Qty: 60 TABLET | Refills: 2 | Status: SHIPPED | OUTPATIENT
Start: 2021-11-08 | End: 2022-01-21 | Stop reason: DRUGHIGH

## 2021-11-10 LAB
C TRACH DNA SPEC QL NAA+PROBE: NOT DETECTED
N GONORRHOEA DNA SPEC QL NAA+PROBE: NOT DETECTED

## 2021-11-11 LAB
BACTERIAL VAGINOSIS DNA: POSITIVE
CANDIDA GLABRATA DNA: NEGATIVE
CANDIDA KRUSEI DNA: NEGATIVE
CANDIDA RRNA VAG QL PROBE: NEGATIVE
T VAGINALIS RRNA GENITAL QL PROBE: NEGATIVE

## 2021-11-12 ENCOUNTER — PATIENT MESSAGE (OUTPATIENT)
Dept: OBSTETRICS AND GYNECOLOGY | Facility: CLINIC | Age: 31
End: 2021-11-12
Payer: COMMERCIAL

## 2021-11-12 RX ORDER — TINIDAZOLE 500 MG/1
TABLET ORAL
Qty: 8 TABLET | Refills: 1 | Status: SHIPPED | OUTPATIENT
Start: 2021-11-12 | End: 2021-12-04

## 2021-11-15 ENCOUNTER — PATIENT MESSAGE (OUTPATIENT)
Dept: PSYCHIATRY | Facility: CLINIC | Age: 31
End: 2021-11-15
Payer: COMMERCIAL

## 2021-11-16 ENCOUNTER — OFFICE VISIT (OUTPATIENT)
Dept: OPHTHALMOLOGY | Facility: CLINIC | Age: 31
End: 2021-11-16
Payer: COMMERCIAL

## 2021-11-16 ENCOUNTER — TELEPHONE (OUTPATIENT)
Dept: OPHTHALMOLOGY | Facility: CLINIC | Age: 31
End: 2021-11-16

## 2021-11-16 DIAGNOSIS — Z98.890 HISTORY OF STRABISMUS SURGERY: ICD-10-CM

## 2021-11-16 DIAGNOSIS — H50.00 ESOTROPIA: Primary | ICD-10-CM

## 2021-11-16 DIAGNOSIS — Z01.818 PRE-OP EVALUATION: ICD-10-CM

## 2021-11-16 PROCEDURE — 1159F PR MEDICATION LIST DOCUMENTED IN MEDICAL RECORD: ICD-10-PCS | Mod: CPTII,S$GLB,, | Performed by: OPHTHALMOLOGY

## 2021-11-16 PROCEDURE — 99999 PR PBB SHADOW E&M-EST. PATIENT-LVL II: ICD-10-PCS | Mod: PBBFAC,,, | Performed by: OPHTHALMOLOGY

## 2021-11-16 PROCEDURE — 92002 PR EYE EXAM, NEW PATIENT,INTERMED: ICD-10-PCS | Mod: S$GLB,,, | Performed by: OPHTHALMOLOGY

## 2021-11-16 PROCEDURE — 99999 PR PBB SHADOW E&M-EST. PATIENT-LVL II: CPT | Mod: PBBFAC,,, | Performed by: OPHTHALMOLOGY

## 2021-11-16 PROCEDURE — 92060 PR SPECIAL EYE EVAL,SENSORIMOTOR: ICD-10-PCS | Mod: S$GLB,,, | Performed by: OPHTHALMOLOGY

## 2021-11-16 PROCEDURE — 1159F MED LIST DOCD IN RCRD: CPT | Mod: CPTII,S$GLB,, | Performed by: OPHTHALMOLOGY

## 2021-11-16 PROCEDURE — 92002 INTRM OPH EXAM NEW PATIENT: CPT | Mod: S$GLB,,, | Performed by: OPHTHALMOLOGY

## 2021-11-16 PROCEDURE — 1160F PR REVIEW ALL MEDS BY PRESCRIBER/CLIN PHARMACIST DOCUMENTED: ICD-10-PCS | Mod: CPTII,S$GLB,, | Performed by: OPHTHALMOLOGY

## 2021-11-16 PROCEDURE — 1160F RVW MEDS BY RX/DR IN RCRD: CPT | Mod: CPTII,S$GLB,, | Performed by: OPHTHALMOLOGY

## 2021-11-16 PROCEDURE — 92060 SENSORIMOTOR EXAMINATION: CPT | Mod: S$GLB,,, | Performed by: OPHTHALMOLOGY

## 2021-11-18 ENCOUNTER — PROCEDURE VISIT (OUTPATIENT)
Dept: OBSTETRICS AND GYNECOLOGY | Facility: CLINIC | Age: 31
End: 2021-11-18
Payer: COMMERCIAL

## 2021-11-18 ENCOUNTER — PATIENT MESSAGE (OUTPATIENT)
Dept: FAMILY MEDICINE | Facility: CLINIC | Age: 31
End: 2021-11-18
Payer: COMMERCIAL

## 2021-11-18 VITALS
WEIGHT: 106.5 LBS | HEIGHT: 68 IN | SYSTOLIC BLOOD PRESSURE: 112 MMHG | DIASTOLIC BLOOD PRESSURE: 64 MMHG | BODY MASS INDEX: 16.14 KG/M2

## 2021-11-18 DIAGNOSIS — Z30.433 ENCOUNTER FOR REMOVAL AND REINSERTION OF IUD: Primary | ICD-10-CM

## 2021-11-18 PROBLEM — Z97.5 IUD (INTRAUTERINE DEVICE) IN PLACE: Status: RESOLVED | Noted: 2019-11-06 | Resolved: 2021-11-18

## 2021-11-18 LAB
B-HCG UR QL: NEGATIVE
CTP QC/QA: YES

## 2021-11-18 PROCEDURE — 81025 POCT URINE PREGNANCY: ICD-10-PCS | Mod: S$GLB,,, | Performed by: OBSTETRICS & GYNECOLOGY

## 2021-11-18 PROCEDURE — 58300 INSERT INTRAUTERINE DEVICE: CPT | Mod: S$GLB,,, | Performed by: OBSTETRICS & GYNECOLOGY

## 2021-11-18 PROCEDURE — 58300 INSERTION OF IUD: ICD-10-PCS | Mod: S$GLB,,, | Performed by: OBSTETRICS & GYNECOLOGY

## 2021-11-18 PROCEDURE — 58301 REMOVAL OF IUD: ICD-10-PCS | Mod: S$GLB,,, | Performed by: OBSTETRICS & GYNECOLOGY

## 2021-11-18 PROCEDURE — 58301 REMOVE INTRAUTERINE DEVICE: CPT | Mod: S$GLB,,, | Performed by: OBSTETRICS & GYNECOLOGY

## 2021-11-18 PROCEDURE — 81025 URINE PREGNANCY TEST: CPT | Mod: S$GLB,,, | Performed by: OBSTETRICS & GYNECOLOGY

## 2021-12-01 ENCOUNTER — OFFICE VISIT (OUTPATIENT)
Dept: PSYCHIATRY | Facility: CLINIC | Age: 31
End: 2021-12-01
Payer: COMMERCIAL

## 2021-12-01 ENCOUNTER — OFFICE VISIT (OUTPATIENT)
Dept: FAMILY MEDICINE | Facility: CLINIC | Age: 31
End: 2021-12-01
Attending: FAMILY MEDICINE
Payer: COMMERCIAL

## 2021-12-01 ENCOUNTER — HOSPITAL ENCOUNTER (OUTPATIENT)
Dept: CARDIOLOGY | Facility: CLINIC | Age: 31
Discharge: HOME | End: 2021-12-01
Payer: COMMERCIAL

## 2021-12-01 ENCOUNTER — HOSPITAL ENCOUNTER (OUTPATIENT)
Dept: RADIOLOGY | Facility: HOSPITAL | Age: 31
Discharge: HOME | End: 2021-12-01
Attending: FAMILY MEDICINE
Payer: COMMERCIAL

## 2021-12-01 ENCOUNTER — PATIENT MESSAGE (OUTPATIENT)
Dept: SURGERY | Facility: HOSPITAL | Age: 31
End: 2021-12-01
Payer: COMMERCIAL

## 2021-12-01 VITALS
SYSTOLIC BLOOD PRESSURE: 100 MMHG | BODY MASS INDEX: 16.52 KG/M2 | WEIGHT: 109 LBS | HEIGHT: 68 IN | DIASTOLIC BLOOD PRESSURE: 60 MMHG | HEART RATE: 62 BPM

## 2021-12-01 DIAGNOSIS — F12.20 CANNABIS DEPENDENCE, UNCOMPLICATED: ICD-10-CM

## 2021-12-01 DIAGNOSIS — Z01.818 PRE-OPERATIVE CLEARANCE: ICD-10-CM

## 2021-12-01 DIAGNOSIS — Z01.818 PRE-OPERATIVE CLEARANCE: Primary | ICD-10-CM

## 2021-12-01 DIAGNOSIS — F43.10 PTSD (POST-TRAUMATIC STRESS DISORDER): ICD-10-CM

## 2021-12-01 DIAGNOSIS — F41.1 GAD (GENERALIZED ANXIETY DISORDER): Primary | ICD-10-CM

## 2021-12-01 DIAGNOSIS — F33.1 MAJOR DEPRESSIVE DISORDER, RECURRENT, MODERATE: ICD-10-CM

## 2021-12-01 LAB
BACTERIA #/AREA URNS AUTO: ABNORMAL /HPF
BILIRUB UR QL STRIP: NEGATIVE
CLARITY UR REFRACT.AUTO: CLEAR
COLOR UR AUTO: YELLOW
GLUCOSE UR QL STRIP: NEGATIVE
HGB UR QL STRIP: ABNORMAL
KETONES UR QL STRIP: ABNORMAL
LEUKOCYTE ESTERASE UR QL STRIP: NEGATIVE
MICROSCOPIC COMMENT: ABNORMAL
NITRITE UR QL STRIP: NEGATIVE
PH UR STRIP: 5 [PH] (ref 5–8)
PROT UR QL STRIP: NEGATIVE
RBC #/AREA URNS AUTO: 1 /HPF (ref 0–4)
SP GR UR STRIP: 1.03 (ref 1–1.03)
SQUAMOUS #/AREA URNS AUTO: 5 /HPF
URN SPEC COLLECT METH UR: ABNORMAL
WBC #/AREA URNS AUTO: 1 /HPF (ref 0–5)

## 2021-12-01 PROCEDURE — 90791 PR PSYCHIATRIC DIAGNOSTIC EVALUATION: ICD-10-PCS | Mod: S$GLB,,, | Performed by: SOCIAL WORKER

## 2021-12-01 PROCEDURE — 93005 ELECTROCARDIOGRAM TRACING: CPT | Mod: S$GLB,,, | Performed by: FAMILY MEDICINE

## 2021-12-01 PROCEDURE — 99214 OFFICE O/P EST MOD 30 MIN: CPT | Mod: S$GLB,,, | Performed by: FAMILY MEDICINE

## 2021-12-01 PROCEDURE — 99999 PR PBB SHADOW E&M-EST. PATIENT-LVL III: ICD-10-PCS | Mod: PBBFAC,,, | Performed by: FAMILY MEDICINE

## 2021-12-01 PROCEDURE — 81001 URINALYSIS AUTO W/SCOPE: CPT | Performed by: FAMILY MEDICINE

## 2021-12-01 PROCEDURE — 99214 PR OFFICE/OUTPT VISIT, EST, LEVL IV, 30-39 MIN: ICD-10-PCS | Mod: S$GLB,,, | Performed by: FAMILY MEDICINE

## 2021-12-01 PROCEDURE — 93005 EKG 12-LEAD: ICD-10-PCS | Mod: S$GLB,,, | Performed by: FAMILY MEDICINE

## 2021-12-01 PROCEDURE — 93010 ELECTROCARDIOGRAM REPORT: CPT | Mod: S$GLB,,, | Performed by: INTERNAL MEDICINE

## 2021-12-01 PROCEDURE — 93010 EKG 12-LEAD: ICD-10-PCS | Mod: S$GLB,,, | Performed by: INTERNAL MEDICINE

## 2021-12-01 PROCEDURE — 71046 X-RAY EXAM CHEST 2 VIEWS: CPT | Mod: TC,FY

## 2021-12-01 PROCEDURE — 99999 PR PBB SHADOW E&M-EST. PATIENT-LVL III: CPT | Mod: PBBFAC,,, | Performed by: FAMILY MEDICINE

## 2021-12-01 PROCEDURE — 71046 X-RAY EXAM CHEST 2 VIEWS: CPT | Mod: 26,,, | Performed by: RADIOLOGY

## 2021-12-01 PROCEDURE — 71046 XR CHEST PA AND LATERAL: ICD-10-PCS | Mod: 26,,, | Performed by: RADIOLOGY

## 2021-12-01 PROCEDURE — 90791 PSYCH DIAGNOSTIC EVALUATION: CPT | Mod: S$GLB,,, | Performed by: SOCIAL WORKER

## 2021-12-02 ENCOUNTER — PATIENT MESSAGE (OUTPATIENT)
Dept: PSYCHIATRY | Facility: CLINIC | Age: 31
End: 2021-12-02
Payer: COMMERCIAL

## 2021-12-10 ENCOUNTER — OFFICE VISIT (OUTPATIENT)
Dept: PSYCHIATRY | Facility: CLINIC | Age: 31
End: 2021-12-10
Payer: COMMERCIAL

## 2021-12-10 DIAGNOSIS — F43.10 PTSD (POST-TRAUMATIC STRESS DISORDER): Primary | ICD-10-CM

## 2021-12-10 DIAGNOSIS — F12.20 CANNABIS DEPENDENCE, UNCOMPLICATED: ICD-10-CM

## 2021-12-10 PROCEDURE — 90834 PR PSYCHOTHERAPY W/PATIENT, 45 MIN: ICD-10-PCS | Mod: S$GLB,,, | Performed by: SOCIAL WORKER

## 2021-12-10 PROCEDURE — 90834 PSYTX W PT 45 MINUTES: CPT | Mod: S$GLB,,, | Performed by: SOCIAL WORKER

## 2021-12-14 ENCOUNTER — PATIENT MESSAGE (OUTPATIENT)
Dept: SURGERY | Facility: HOSPITAL | Age: 31
End: 2021-12-14
Payer: COMMERCIAL

## 2021-12-14 ENCOUNTER — PATIENT MESSAGE (OUTPATIENT)
Dept: FAMILY MEDICINE | Facility: CLINIC | Age: 31
End: 2021-12-14
Payer: COMMERCIAL

## 2021-12-14 ENCOUNTER — PATIENT MESSAGE (OUTPATIENT)
Dept: PSYCHIATRY | Facility: CLINIC | Age: 31
End: 2021-12-14
Payer: COMMERCIAL

## 2021-12-17 ENCOUNTER — OFFICE VISIT (OUTPATIENT)
Dept: PSYCHIATRY | Facility: CLINIC | Age: 31
End: 2021-12-17
Payer: COMMERCIAL

## 2021-12-17 ENCOUNTER — PATIENT MESSAGE (OUTPATIENT)
Dept: PSYCHIATRY | Facility: CLINIC | Age: 31
End: 2021-12-17
Payer: COMMERCIAL

## 2021-12-17 DIAGNOSIS — F12.20 CANNABIS DEPENDENCE, UNCOMPLICATED: ICD-10-CM

## 2021-12-17 DIAGNOSIS — F43.10 PTSD (POST-TRAUMATIC STRESS DISORDER): Primary | ICD-10-CM

## 2021-12-17 PROCEDURE — 90834 PSYTX W PT 45 MINUTES: CPT | Mod: S$GLB,,, | Performed by: SOCIAL WORKER

## 2021-12-17 PROCEDURE — 90834 PR PSYCHOTHERAPY W/PATIENT, 45 MIN: ICD-10-PCS | Mod: S$GLB,,, | Performed by: SOCIAL WORKER

## 2021-12-28 ENCOUNTER — ANESTHESIA EVENT (OUTPATIENT)
Dept: SURGERY | Facility: HOSPITAL | Age: 31
End: 2021-12-28
Payer: COMMERCIAL

## 2021-12-28 ENCOUNTER — TELEPHONE (OUTPATIENT)
Dept: OPHTHALMOLOGY | Facility: CLINIC | Age: 31
End: 2021-12-28
Payer: COMMERCIAL

## 2021-12-28 NOTE — OP NOTE
Procedure Date 12/29/21     SURGEON:  MAITE Cisneros M.D.     ASSISTANT:  ROSSY Brooks (RES)     PREOPERATIVE DIAGNOSIS:  Strabismus - esotropia.     POSTOPERATIVE DIAGNOSIS:  Strabismus - esotropia; reoperation     PROCEDURE:  Recession of medial rectus, 3.0 mm, OD; to 13 mm from limbus with an adjustable suture     COMPLICATIONS:  None.     BLOOD LOSS:  Less than 2 mL.     PROCEDURE IN DETAIL:  The patient was brought to the operating suite where general intubation anesthesia was achieved.  Both eyes were prepped and draped in a sterile manner and a lid speculum placed in the right eye. Through an inferior nasal fornix incision the medial rectus muscle was identified and placed on a muscle hook.  It was found to have been recessed to 10 mm from the limbus.  The scarring between the underside of the conjunctiva and sclera was sharply dissected.  The check ligaments were cleared anteriorly and posteriorly.  A double-armed 6 Vicryl suture was passed through the muscle belly with locking bites placed in the middle and upper and lower edge of the muscle.  The muscle was disinserted from the globe and reattached to the sclera 6.5 mm  from the limbus. A noose suture was placed around the muscle suture and positioned 6.5 mm posteriorly , so the muscle rested 13.0 mm from the limbus. This created a 3.0 mm recession. The conjunctiva was reapproximated.  Betadine solution and Maxitrol jeff were placed in the eyes and the patient was brought the recovery room in good condition.

## 2021-12-28 NOTE — BRIEF OP NOTE
Brief Operative Note  Ophthalmology Service      Date of Procedure: 12/29/21     Attending Physician: MAITE Cisneros Jr., MD     Assistant: ROSSY Brooks MD    Pre-Operative Diagnosis: Esotropia [H50.00]     Post-Operative Diagnosis: Same as pre-operative diagnosis    Treatments/Procedures: Recess MR  OD 3.0 mm; to 13 from the limbus    Intraoperative Findings: MR OD found at 10 from the limbus    Anesthesia: General    Complications: None    Estimated Blood Loss: < 5 cc    Specimens: None    -------------------------------------------------------------  Full dictated Operative Report to follow.  -------------------------------------------------------------

## 2021-12-28 NOTE — PRE-PROCEDURE INSTRUCTIONS
PREOP INSTRUCTIONS:No solid food ,milk or milk products for 8 hours prior to procedure.Clear liquids are allowed up to 2 hours before procedure.Clear liquids are:water,apple juice,gatorade & powerade.Instructed to follow the surgeon's instructions if they differ from these.Shower instructions as well as directions to the [facility] were given.Encouraged to wear loose fitting,comfortable clothing.Medication instructions for pm prior to and am of procedure reviewed.Instructed to avoid taking vitamins,supplements,aspirin and ibuprofen the morning of surgery.    Informed of the current updated visitor policy allowing one adult support person pre and post procedure.The support person may remain with the patient prior to their procedure and must then wait in a socially distanced manor until a member of the medical team provides an update at the conclusion of the procedure.  Patient denies any side effects or issues with anesthesia or sedation.     Patient does not know arrival time.Explained that this information comes from the surgeon's office and if they haven't heard from them by 2 or 3 pm to call the office.Patient stated an understanding.

## 2021-12-29 ENCOUNTER — HOSPITAL ENCOUNTER (OUTPATIENT)
Facility: HOSPITAL | Age: 31
Discharge: HOME | End: 2021-12-29
Attending: OPHTHALMOLOGY | Admitting: OPHTHALMOLOGY
Payer: COMMERCIAL

## 2021-12-29 ENCOUNTER — ANESTHESIA (OUTPATIENT)
Dept: SURGERY | Facility: HOSPITAL | Age: 31
End: 2021-12-29
Payer: COMMERCIAL

## 2021-12-29 VITALS
SYSTOLIC BLOOD PRESSURE: 123 MMHG | HEIGHT: 68 IN | HEART RATE: 58 BPM | RESPIRATION RATE: 20 BRPM | WEIGHT: 103.5 LBS | BODY MASS INDEX: 15.69 KG/M2 | DIASTOLIC BLOOD PRESSURE: 70 MMHG | TEMPERATURE: 98 F | OXYGEN SATURATION: 100 %

## 2021-12-29 DIAGNOSIS — H50.00 ESOTROPIA: Primary | ICD-10-CM

## 2021-12-29 LAB
B-HCG UR QL: NEGATIVE
CTP QC/QA: YES

## 2021-12-29 PROCEDURE — 71000015 HC POSTOP RECOV 1ST HR: Performed by: OPHTHALMOLOGY

## 2021-12-29 PROCEDURE — 67332 PR STABISMUS SURG,SCAR EXTRAOCUL MUSC: ICD-10-PCS | Mod: RT,,, | Performed by: OPHTHALMOLOGY

## 2021-12-29 PROCEDURE — D9220A PRA ANESTHESIA: ICD-10-PCS | Mod: ,,, | Performed by: ANESTHESIOLOGY

## 2021-12-29 PROCEDURE — 67335 EYE SUTURE DURING SURGERY: CPT | Mod: RT,,, | Performed by: OPHTHALMOLOGY

## 2021-12-29 PROCEDURE — 25000003 PHARM REV CODE 250: Performed by: STUDENT IN AN ORGANIZED HEALTH CARE EDUCATION/TRAINING PROGRAM

## 2021-12-29 PROCEDURE — 63600175 PHARM REV CODE 636 W HCPCS: Performed by: STUDENT IN AN ORGANIZED HEALTH CARE EDUCATION/TRAINING PROGRAM

## 2021-12-29 PROCEDURE — 81025 URINE PREGNANCY TEST: CPT | Performed by: OPHTHALMOLOGY

## 2021-12-29 PROCEDURE — 67311 REVISE EYE MUSCLE: CPT | Mod: RT,,, | Performed by: OPHTHALMOLOGY

## 2021-12-29 PROCEDURE — 36000706: Performed by: OPHTHALMOLOGY

## 2021-12-29 PROCEDURE — 37000009 HC ANESTHESIA EA ADD 15 MINS: Performed by: OPHTHALMOLOGY

## 2021-12-29 PROCEDURE — D9220A PRA ANESTHESIA: Mod: ,,, | Performed by: ANESTHESIOLOGY

## 2021-12-29 PROCEDURE — 37000008 HC ANESTHESIA 1ST 15 MINUTES: Performed by: OPHTHALMOLOGY

## 2021-12-29 PROCEDURE — 67332 REREVISE EYE MUSCLES ADD-ON: CPT | Mod: RT,,, | Performed by: OPHTHALMOLOGY

## 2021-12-29 PROCEDURE — 25000003 PHARM REV CODE 250

## 2021-12-29 PROCEDURE — 36000707: Performed by: OPHTHALMOLOGY

## 2021-12-29 PROCEDURE — 67335 PR STABISMUS SURG,PLACE ADJUST SUTURE: ICD-10-PCS | Mod: RT,,, | Performed by: OPHTHALMOLOGY

## 2021-12-29 PROCEDURE — 71000044 HC DOSC ROUTINE RECOVERY FIRST HOUR: Performed by: OPHTHALMOLOGY

## 2021-12-29 PROCEDURE — 67311 PR STABISMUS SURG,ONE HORIZ MUSCLE: ICD-10-PCS | Mod: RT,,, | Performed by: OPHTHALMOLOGY

## 2021-12-29 PROCEDURE — 25000003 PHARM REV CODE 250: Performed by: OPHTHALMOLOGY

## 2021-12-29 RX ORDER — DEXMEDETOMIDINE HYDROCHLORIDE 100 UG/ML
INJECTION, SOLUTION INTRAVENOUS
Status: DISCONTINUED | OUTPATIENT
Start: 2021-12-29 | End: 2021-12-29

## 2021-12-29 RX ORDER — EPHEDRINE SULFATE 50 MG/ML
INJECTION, SOLUTION INTRAVENOUS
Status: DISCONTINUED | OUTPATIENT
Start: 2021-12-29 | End: 2021-12-29

## 2021-12-29 RX ORDER — MIDAZOLAM HYDROCHLORIDE 1 MG/ML
INJECTION, SOLUTION INTRAMUSCULAR; INTRAVENOUS
Status: DISCONTINUED | OUTPATIENT
Start: 2021-12-29 | End: 2021-12-29

## 2021-12-29 RX ORDER — NEOMYCIN SULFATE, POLYMYXIN B SULFATE, AND DEXAMETHASONE 3.5; 10000; 1 MG/G; [USP'U]/G; MG/G
OINTMENT OPHTHALMIC
Status: DISCONTINUED | OUTPATIENT
Start: 2021-12-29 | End: 2021-12-29 | Stop reason: HOSPADM

## 2021-12-29 RX ORDER — SODIUM CHLORIDE 0.9 % (FLUSH) 0.9 %
10 SYRINGE (ML) INJECTION
Status: DISCONTINUED | OUTPATIENT
Start: 2021-12-29 | End: 2021-12-29 | Stop reason: HOSPADM

## 2021-12-29 RX ORDER — PHENYLEPHRINE HYDROCHLORIDE 25 MG/ML
SOLUTION/ DROPS OPHTHALMIC
Status: DISCONTINUED | OUTPATIENT
Start: 2021-12-29 | End: 2021-12-29 | Stop reason: HOSPADM

## 2021-12-29 RX ORDER — PHENYLEPHRINE HYDROCHLORIDE 25 MG/ML
SOLUTION/ DROPS OPHTHALMIC
Status: DISCONTINUED
Start: 2021-12-29 | End: 2021-12-29 | Stop reason: HOSPADM

## 2021-12-29 RX ORDER — PROPOFOL 10 MG/ML
VIAL (ML) INTRAVENOUS
Status: DISCONTINUED | OUTPATIENT
Start: 2021-12-29 | End: 2021-12-29

## 2021-12-29 RX ORDER — LIDOCAINE HYDROCHLORIDE 20 MG/ML
INJECTION INTRAVENOUS
Status: DISCONTINUED | OUTPATIENT
Start: 2021-12-29 | End: 2021-12-29

## 2021-12-29 RX ORDER — HALOPERIDOL 5 MG/ML
0.5 INJECTION INTRAMUSCULAR EVERY 10 MIN PRN
Status: DISCONTINUED | OUTPATIENT
Start: 2021-12-29 | End: 2021-12-29 | Stop reason: HOSPADM

## 2021-12-29 RX ORDER — ACETAMINOPHEN 325 MG/1
650 TABLET ORAL EVERY 6 HOURS PRN
Status: DISCONTINUED | OUTPATIENT
Start: 2021-12-29 | End: 2021-12-29 | Stop reason: HOSPADM

## 2021-12-29 RX ORDER — HYDROMORPHONE HYDROCHLORIDE 2 MG/ML
INJECTION, SOLUTION INTRAMUSCULAR; INTRAVENOUS; SUBCUTANEOUS
Status: DISCONTINUED | OUTPATIENT
Start: 2021-12-29 | End: 2021-12-29

## 2021-12-29 RX ORDER — ACETAMINOPHEN 325 MG/1
TABLET ORAL
Status: COMPLETED
Start: 2021-12-29 | End: 2021-12-29

## 2021-12-29 RX ORDER — DEXAMETHASONE SODIUM PHOSPHATE 4 MG/ML
INJECTION, SOLUTION INTRA-ARTICULAR; INTRALESIONAL; INTRAMUSCULAR; INTRAVENOUS; SOFT TISSUE
Status: DISCONTINUED | OUTPATIENT
Start: 2021-12-29 | End: 2021-12-29

## 2021-12-29 RX ORDER — ONDANSETRON 2 MG/ML
INJECTION INTRAMUSCULAR; INTRAVENOUS
Status: DISCONTINUED | OUTPATIENT
Start: 2021-12-29 | End: 2021-12-29

## 2021-12-29 RX ORDER — NEOMYCIN SULFATE, POLYMYXIN B SULFATE, AND DEXAMETHASONE 3.5; 10000; 1 MG/G; [USP'U]/G; MG/G
OINTMENT OPHTHALMIC
Status: DISCONTINUED
Start: 2021-12-29 | End: 2021-12-29 | Stop reason: HOSPADM

## 2021-12-29 RX ADMIN — DEXMEDETOMIDINE HYDROCHLORIDE 8 MCG: 100 INJECTION, SOLUTION, CONCENTRATE INTRAVENOUS at 08:12

## 2021-12-29 RX ADMIN — SODIUM CHLORIDE: 0.9 INJECTION, SOLUTION INTRAVENOUS at 07:12

## 2021-12-29 RX ADMIN — PROPOFOL 200 MG: 10 INJECTION, EMULSION INTRAVENOUS at 08:12

## 2021-12-29 RX ADMIN — DEXAMETHASONE SODIUM PHOSPHATE 8 MG: 4 INJECTION, SOLUTION INTRAMUSCULAR; INTRAVENOUS at 08:12

## 2021-12-29 RX ADMIN — EPHEDRINE SULFATE 5 MG: 50 INJECTION INTRAVENOUS at 08:12

## 2021-12-29 RX ADMIN — ONDANSETRON 4 MG: 2 INJECTION INTRAMUSCULAR; INTRAVENOUS at 08:12

## 2021-12-29 RX ADMIN — ACETAMINOPHEN 650 MG: 325 TABLET ORAL at 09:12

## 2021-12-29 RX ADMIN — LIDOCAINE HYDROCHLORIDE 100 MG: 20 INJECTION, SOLUTION INTRAVENOUS at 08:12

## 2021-12-29 RX ADMIN — MIDAZOLAM HYDROCHLORIDE 2 MG: 1 INJECTION, SOLUTION INTRAMUSCULAR; INTRAVENOUS at 07:12

## 2021-12-29 RX ADMIN — HYDROMORPHONE HYDROCHLORIDE 0.4 MG: 2 INJECTION INTRAMUSCULAR; INTRAVENOUS; SUBCUTANEOUS at 08:12

## 2021-12-29 NOTE — DISCHARGE INSTRUCTIONS
Patient Education       Strabismus Discharge Instructions   About this topic   The medical name for crossed eyes is strabismus. With this problem, the eyes do not line up the same way. The muscles of the eyes are not working together. The eyes do not look in the same direction or focus on the same object. One eye may be looking at one thing. At the same time, the other eye is seeing something totally different. Your eye may be turned all the time or only when you are under stress or sick.  Each eye sends a different message to the brain. The brain may ignore the message sent from the weaker eye. If this condition is not treated, you may lose eyesight in the weaker eye.     What care is needed at home?   · Ask your doctor what you need to do when you go home. Make sure you ask questions if you do not understand what the doctor says. This way you will know what you need to do.  · Wear glasses, contact lenses, or an eye patch as directed by your doctor.  · Do eye exercises as you have been trained to do.  What follow-up care is needed?   · Your doctor may ask you to make visits to the office to check on your progress. Be sure to keep these visits.  · Your doctor may send you to an eye specialist called an ophthalmologist.  What drugs may be needed?   The doctor may order drugs to:  · Relax the eye muscles  · Blur the eyesight in the stronger eye. This makes the weak eye work harder.  Will physical activity be limited?   Depending on how much your eyesight is affected, some of your activities may be limited. Talk to your doctor about the right amount of activity for you.  What problems could happen?   · Eyes may not line up correctly  · Problems with depth perception  · Loss of eyesight in one eye  · Problems reading, driving, or playing sports  · Children may have learning problems  What can be done to prevent this health problem?   You cannot prevent this problem from happening.  When do I need to call the doctor?    · Signs of being cross eyed  · Seeing double  · Trouble seeing  · One eye looks straight ahead and the other is looking up, down, or outwards  Helpful tips   Have your child's eyesight checked by age 3. Do this before age 3 if there is history of eye problems in your family.  Teach Back: Helping You Understand   The Teach Back Method helps you understand the information we are giving you. After you talk with the staff, tell them in your own words what you learned. This helps to make sure the staff has described each thing clearly. It also helps to explain things that may have been confusing. Before going home, make sure you can do these:  · I can tell you about my condition.  · I can tell you how to care for my eye.  · I can tell you what I will do if I have trouble seeing or see double.  Where can I learn more?   [employer]  https://[employer].org/en/parents/strabismus.html?ref=search   [facility] Choices  http://www.nhs.uk/conditions/squint/pages/introduction2.aspx   Last Reviewed Date   2020-12-14  Consumer Information Use and Disclaimer   This information is not specific medical advice and does not replace information you receive from your health care provider. This is only a brief summary of general information. It does NOT include all information about conditions, illnesses, injuries, tests, procedures, treatments, therapies, discharge instructions or life-style choices that may apply to you. You must talk with your health care provider for complete information about your health and treatment options. This information should not be used to decide whether or not to accept your health care providers advice, instructions or recommendations. Only your health care provider has the knowledge and training to provide advice that is right for you.  Copyright   Copyright © 2021 [employer] and its affiliates and/or licensors. All rights reserved.

## 2021-12-29 NOTE — PATIENT INSTRUCTIONS
Patient Instructions:   - Resume same diet as prior to surgery  - Resume activity as tolerated with no swimming for 2 weeks  - Apply ice packs to surgical eye(s) for 72 hours as tolerated  - Apply maxitrol ointment three times per day to both eyes for 4 days   - Call the Ophthalmology clinic to schedule an appointment with Dr. Cisneros     ACTIVITY LEVEL:  If you received sedation or an anesthetic, you may feel sleepy for several hours. Rest until you are more awake. Gradually resume your normal activities in two days. Children may return to school in 2-3 days. It is all right to watch television or to read. Swimming is permitted in two weeks.    CARE OF INCISION:  A blood-tinged discharge from the eye is normal. This can be gently washed away with a clean, damp wash cloth. Do not use water, gauze, or cotton to wipe the lids. The morning after surgery, you may have difficulty opening your eyes. This is normal. If dry blood or secretions are holding the lids together, you may open the eyes by gently  the lids from above and below. Please wash your hands thoroughly before doing this. If the lids dont open, do not force them apart. (A child will cry and the tears will soften the secretions and a parent can try again later.) Use cool compresses to the eyes for 24 hours, if tolerated for comfort. Do not place any medication in the eye unless otherwise instructed.    BATHING:  Keep your face out of water for five days after surgery - NO SHOWERS.    DIET:  At home, continue with liquids, and if there is no nausea, you may eat a soft diet. Gradually resume your  normal diet.    PAIN:  If needed for discomfort, you can use cold compresses and take Tylenol (usual recommended dose) every four  hours. Generally, do not take Tylenol more than four times a day.    WHEN TO CALL THE DOCTOR:   Any increase in the amount of swelling of the eyes and adjacent tissues   Heavy yellow discharge from the eyes   Fever over 101ºF  (38.4ºC)    A purple discoloration of the lower lids is common. It appears a few days after surgery and does not affect healing. You may experience double vision after surgery. This is normal and will disappear in a few days or weeks. Prescription glasses may be worn unless otherwise instructed. The eyes may be unusually sensitive to light for several days. Dark sunglasses will help.    FOR EMERGENCIES:  If any unusual problems or difficulties occur, contact Dr. Cisneros or the resident at [phone] (page ) or at the Clinic office, [phone].

## 2021-12-29 NOTE — PLAN OF CARE
Patient tolerated procedure/anesthesia well, vss, no complications or concerns. Patient complains of mild discomfort to eyes (see MAR), patient denies nausea, and tolerates PO intake. Consents with chart. RN reviewed discharge instructions with patient and mother at bedside,verbalized understanding. Patient left via wheelchair.

## 2021-12-29 NOTE — H&P
Pre-Operative History & Physical  Ophthalmology      SUBJECTIVE:     History of Present Illness:  Patient is a 31 y.o. female presents with strabismus    MEDICATIONS:   Facility-Administered Medications Prior to Admission   Medication    levonorgestreL (MIRENA) 20 mcg/24 hours (7 yrs) 52 mg IUD 1 Intra Uterine Device     PTA Medications   Medication Sig    clonazePAM (KLONOPIN) 0.5 MG tablet Take 1 tablet (0.5 mg total) by mouth 2 (two) times daily as needed for Anxiety. (Patient taking differently: Take 0.5 mg by mouth 2 (two) times daily as needed for Anxiety. 12/28/2021 PT TAKING .75 MG NIGHTLY AND 0.5 MG PRN MORNINGS)    miSOPROStoL (CYTOTEC) 200 MCG Tab take 9, 5, 1 hour before procedure       ALLERGIES: Review of patient's allergies indicates:  No Known Allergies    PAST MEDICAL HISTORY:   Past Medical History:   Diagnosis Date    Anxiety     Depression     Marijuana smoker, continuous     Psychiatric exam requested by authority     PTSD (post-traumatic stress disorder) 10/19/2021    Therapy      PAST SURGICAL HISTORY:   Past Surgical History:   Procedure Laterality Date    COLONOSCOPY N/A 7/29/2021    Procedure: COLONOSCOPY;  Surgeon: Kolby German MD;  [facility]  Service: Endoscopy;  Laterality: N/A;    ESOPHAGOGASTRODUODENOSCOPY N/A 7/29/2021    Procedure: EGD (ESOPHAGOGASTRODUODENOSCOPY);  Surgeon: Kolby German MD;  [facility]  Service: Endoscopy;  Laterality: N/A;  pt completed COVID vaccine- see Immunization record in chart-rb  pt requested first available GI ('s first avialable is 8/12)    INTRAUTERINE DEVICE INSERTION  2015 / 2021    MIRENA X 2    STRABISMUS SURGERY Bilateral 09/05/12     PAST FAMILY HISTORY:   Family History   Problem Relation Age of Onset    Depression Mother     Anxiety disorder Mother     ADD / ADHD Father     Prostate cancer Father     Breast cancer Neg Hx     Colon cancer Neg Hx     Ovarian cancer Neg Hx       SOCIAL HISTORY:   Social History     Tobacco Use    Smoking status: Never Smoker    Smokeless tobacco: Never Used   Substance Use Topics    Alcohol use: Yes     Comment: 3-4 glasses of wine three times a week     Drug use: Yes     Frequency: 7.0 times per week     Types: Marijuana     Comment: 3 times a day         MENTAL STATUS: Alert    REVIEW OF SYSTEMS: Negative    OBJECTIVE:     Vital Signs (Most Recent)       Physical Exam:  General: NAD  HEENT: Strabismus  Lungs: Adequate respirations  Heart: + pulses  Abdomen: Soft    ASSESSMENT/PLAN:     Patient is a 31 y.o. female with strabismus     - Risks/benefits/alternatives of the procedure discussed with the patient   - Informed consent obtained prior to surgery and the patient voiced good understanding.   - To OR for surgical correction of strabismus

## 2021-12-29 NOTE — DISCHARGE SUMMARY
Discharge Summary  Ophthalmology Service      Admit Date: 12/29/2021    Discharge Date: 12/29/2021    Attending Physician: MAITE Cisneros Jr., MD     Discharge Physician: Topher Brooks MD    Discharged Condition: Good    Reason for Admission: Esotropia [H50.00]     Treatments/Procedures: Strabismus Surgery (see dictated report for details).    Hospital Course: Stable, dictated    Consults: None    Significant Diagnostic Studies: None    Disposition: Home    Patient Instructions:   - Resume same diet as prior to surgery  - Resume activity as tolerated with no swimming for 2 weeks  - Apply ice packs to surgical eye(s) for 72 hours as tolerated  - Apply maxitrol ointment three times per day to both eyes for 4 days   - Call the Ophthalmology clinic to schedule an appointment with Dr. Cisneros     Patient Instructions:   Current Discharge Medication List      CONTINUE these medications which have NOT CHANGED    Details   clonazePAM (KLONOPIN) 0.5 MG tablet Take 1 tablet (0.5 mg total) by mouth 2 (two) times daily as needed for Anxiety.  Qty: 60 tablet, Refills: 2         STOP taking these medications       miSOPROStoL (CYTOTEC) 200 MCG Tab Comments:   Reason for Stopping:               No discharge procedures on file.

## 2022-01-04 ENCOUNTER — PATIENT MESSAGE (OUTPATIENT)
Dept: PSYCHIATRY | Facility: CLINIC | Age: 32
End: 2022-01-04
Payer: COMMERCIAL

## 2022-01-07 ENCOUNTER — OFFICE VISIT (OUTPATIENT)
Dept: PSYCHIATRY | Facility: CLINIC | Age: 32
End: 2022-01-07
Payer: COMMERCIAL

## 2022-01-07 DIAGNOSIS — F12.20 CANNABIS DEPENDENCE, UNCOMPLICATED: ICD-10-CM

## 2022-01-07 DIAGNOSIS — F43.10 PTSD (POST-TRAUMATIC STRESS DISORDER): Primary | ICD-10-CM

## 2022-01-07 PROCEDURE — 90834 PR PSYCHOTHERAPY W/PATIENT, 45 MIN: ICD-10-PCS | Mod: 95,,, | Performed by: SOCIAL WORKER

## 2022-01-07 PROCEDURE — 90834 PSYTX W PT 45 MINUTES: CPT | Mod: 95,,, | Performed by: SOCIAL WORKER
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
## 2022-01-10 ENCOUNTER — PATIENT MESSAGE (OUTPATIENT)
Dept: OPHTHALMOLOGY | Facility: CLINIC | Age: 32
End: 2022-01-10
Payer: COMMERCIAL

## 2022-01-14 ENCOUNTER — TELEPHONE (OUTPATIENT)
Dept: OPHTHALMOLOGY | Facility: CLINIC | Age: 32
End: 2022-01-14
Payer: COMMERCIAL

## 2022-01-14 ENCOUNTER — PATIENT MESSAGE (OUTPATIENT)
Dept: PSYCHIATRY | Facility: CLINIC | Age: 32
End: 2022-01-14
Payer: COMMERCIAL

## 2022-01-14 ENCOUNTER — OFFICE VISIT (OUTPATIENT)
Dept: PSYCHIATRY | Facility: CLINIC | Age: 32
End: 2022-01-14
Payer: COMMERCIAL

## 2022-01-14 DIAGNOSIS — F43.10 PTSD (POST-TRAUMATIC STRESS DISORDER): Primary | ICD-10-CM

## 2022-01-14 PROCEDURE — 90834 PSYTX W PT 45 MINUTES: CPT | Mod: 95,,, | Performed by: SOCIAL WORKER

## 2022-01-14 PROCEDURE — 90834 PR PSYCHOTHERAPY W/PATIENT, 45 MIN: ICD-10-PCS | Mod: 95,,, | Performed by: SOCIAL WORKER

## 2022-01-14 NOTE — TELEPHONE ENCOUNTER
Spoke to Aby about what to expect with post op healing.  Advised to wait until one month post op before assessing if she is under corrected from Esotropia surgery on 12/29.  Post op appt scheduled for Feb 17th,
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
## 2022-01-19 ENCOUNTER — PATIENT MESSAGE (OUTPATIENT)
Dept: PSYCHIATRY | Facility: CLINIC | Age: 32
End: 2022-01-19
Payer: COMMERCIAL

## 2022-01-21 ENCOUNTER — OFFICE VISIT (OUTPATIENT)
Dept: PSYCHIATRY | Facility: CLINIC | Age: 32
End: 2022-01-21
Payer: COMMERCIAL

## 2022-01-21 DIAGNOSIS — F43.10 PTSD (POST-TRAUMATIC STRESS DISORDER): Primary | ICD-10-CM

## 2022-01-21 DIAGNOSIS — F12.20 CANNABIS DEPENDENCE, UNCOMPLICATED: ICD-10-CM

## 2022-01-21 PROCEDURE — 90834 PR PSYCHOTHERAPY W/PATIENT, 45 MIN: ICD-10-PCS | Mod: 95,,, | Performed by: SOCIAL WORKER

## 2022-01-21 PROCEDURE — 90834 PSYTX W PT 45 MINUTES: CPT | Mod: 95,,, | Performed by: SOCIAL WORKER

## 2022-01-21 RX ORDER — CLONAZEPAM 1 MG/1
1 TABLET ORAL DAILY PRN
Qty: 30 TABLET | Refills: 0 | Status: SHIPPED | OUTPATIENT
Start: 2022-01-21 | End: 2022-02-15 | Stop reason: DRUGHIGH

## 2022-01-21 RX ORDER — AMITRIPTYLINE HYDROCHLORIDE 25 MG/1
25 TABLET, FILM COATED ORAL NIGHTLY PRN
Qty: 30 TABLET | Refills: 1 | Status: SHIPPED | OUTPATIENT
Start: 2022-01-21 | End: 2022-02-15 | Stop reason: SDUPTHER
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
## 2022-01-23 ENCOUNTER — PATIENT MESSAGE (OUTPATIENT)
Dept: PSYCHIATRY | Facility: CLINIC | Age: 32
End: 2022-01-23
Payer: COMMERCIAL

## 2022-01-24 ENCOUNTER — PATIENT MESSAGE (OUTPATIENT)
Dept: PSYCHIATRY | Facility: CLINIC | Age: 32
End: 2022-01-24
Payer: COMMERCIAL

## 2022-02-04 ENCOUNTER — OFFICE VISIT (OUTPATIENT)
Dept: PSYCHIATRY | Facility: CLINIC | Age: 32
End: 2022-02-04
Payer: COMMERCIAL

## 2022-02-04 ENCOUNTER — PATIENT MESSAGE (OUTPATIENT)
Dept: PSYCHIATRY | Facility: CLINIC | Age: 32
End: 2022-02-04
Payer: COMMERCIAL

## 2022-02-04 DIAGNOSIS — F41.1 GAD (GENERALIZED ANXIETY DISORDER): ICD-10-CM

## 2022-02-04 DIAGNOSIS — F43.10 PTSD (POST-TRAUMATIC STRESS DISORDER): Primary | ICD-10-CM

## 2022-02-04 DIAGNOSIS — F12.20 CANNABIS DEPENDENCE, UNCOMPLICATED: ICD-10-CM

## 2022-02-04 PROCEDURE — 90834 PR PSYCHOTHERAPY W/PATIENT, 45 MIN: ICD-10-PCS | Mod: 95,,, | Performed by: SOCIAL WORKER

## 2022-02-04 PROCEDURE — 90834 PSYTX W PT 45 MINUTES: CPT | Mod: 95,,, | Performed by: SOCIAL WORKER

## 2022-02-04 NOTE — PROGRESS NOTES
Individual Psychotherapy (PhD/LCSW)    2/4/2022    The patient location is: [place], [place]  The chief complaint leading to consultation is: PTSD    Visit type: audiovisual    Face to Face time with patient: 45 minutes  60 minutes of total time spent on the encounter, which includes face to face time and non-face to face time preparing to see the patient (eg, review of tests), Obtaining and/or reviewing separately obtained history, Documenting clinical information in the electronic or other health record, Independently interpreting results (not separately reported) and communicating results to the patient/family/caregiver, or Care coordination (not separately reported).     Each patient to whom he or she provides medical services by telemedicine is:  (1) informed of the relationship between the physician and patient and the respective role of any other health care provider with respect to management of the patient; and (2) notified that he or she may decline to receive medical services by telemedicine and may withdraw from such care at any time.      Site:  [facility]         Therapeutic Intervention: Met with patient.  Outpatient - Insight oriented psychotherapy 45 min - CPT code 96422, Outpatient - Behavior modifying psychotherapy 45 min - CPT code 85208 and Outpatient - Supportive psychotherapy 45 min - CPT Code 70273    Chief complaint/reason for encounter: PTSD     Interval history and content of current session: Pt is a 31 year-old  white female who presents this afternoon for a follow up therapy session. Pt was AAOx4, pleasant and engaged. Pt reports that yesterday was the anniversary of her father's death. Pt states that she had planned to take off today but didn't want to be tempted to smoke marijuana before today's session. Pt reports that she has been tearful a lot regarding her father's death, especially when reading a letter he wrote to pt 1.5 years before his death. Pt reports that she  was able to memorialize his death by spending time with close musician friends that she has known since college and who knew her father. Pt states that she was able to make a toast and felt like her sadness was embraced. Pt reports that she recently joined a gym as her mother offered to pay, and has been using the membership primarily to stretch, increase her flexibility, and enjoy the sauna and steam room. Pt reports that she is coloring less but has been using mindfulness techniques when in the steam/sauna room by sitting with her thoughts and focusing on being gentle with her body. 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 Pt states that she was able to express how overwhelmed she is feeling and what she would need to feel less stressed. Pt reports that she felt heard and is hopeful that change will occur. Clinician spent time providing praise to pt for taking good care of herself, actively engaging in activities ([employer]) and being able to feel hard feelings without losing ability to function. Pt to continue weekly sessions virtually until she is on break from her job at the school.      Treatment plan:  · Target symptoms: anxiety , adjustment, grief, work stress, PTSD  · Why chosen therapy is appropriate versus another modality: relevant to diagnosis, patient responds to this modality, evidence based practice  · Outcome monitoring methods: self-report, observation  · Therapeutic intervention type: insight oriented psychotherapy, behavior modifying psychotherapy, supportive psychotherapy    Risk parameters:  Patient reports no suicidal ideation  Patient reports no homicidal ideation  Patient reports no self-injurious behavior  Patient reports no violent behavior    Verbal deficits: None    Patient's response to intervention:  The patient's response to intervention is  accepting.    Progress toward goals and other mental status changes:  The patient's progress toward goals is excellent.    Diagnosis:     ICD-10-CM ICD-9-CM   1. PTSD (post-traumatic stress disorder)  F43.10 309.81   2. SABI (generalized anxiety disorder)  F41.1 300.02   3. Cannabis dependence, uncomplicated  F12.20 304.30       Plan:  individual psychotherapy    Return to clinic: 1 week    Length of Service (minutes): 45

## 2022-02-11 ENCOUNTER — OFFICE VISIT (OUTPATIENT)
Dept: PSYCHIATRY | Facility: CLINIC | Age: 32
End: 2022-02-11
Payer: COMMERCIAL

## 2022-02-11 DIAGNOSIS — F12.20 CANNABIS DEPENDENCE, UNCOMPLICATED: ICD-10-CM

## 2022-02-11 DIAGNOSIS — F43.10 PTSD (POST-TRAUMATIC STRESS DISORDER): Primary | ICD-10-CM

## 2022-02-11 DIAGNOSIS — F41.1 GAD (GENERALIZED ANXIETY DISORDER): ICD-10-CM

## 2022-02-11 PROCEDURE — 90834 PR PSYCHOTHERAPY W/PATIENT, 45 MIN: ICD-10-PCS | Mod: 95,,, | Performed by: SOCIAL WORKER

## 2022-02-11 PROCEDURE — 90834 PSYTX W PT 45 MINUTES: CPT | Mod: 95,,, | Performed by: SOCIAL WORKER
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
## 2022-02-15 ENCOUNTER — OFFICE VISIT (OUTPATIENT)
Dept: PSYCHIATRY | Facility: CLINIC | Age: 32
End: 2022-02-15
Payer: COMMERCIAL

## 2022-02-15 DIAGNOSIS — F43.10 PTSD (POST-TRAUMATIC STRESS DISORDER): ICD-10-CM

## 2022-02-15 DIAGNOSIS — F41.1 GAD (GENERALIZED ANXIETY DISORDER): Primary | ICD-10-CM

## 2022-02-15 DIAGNOSIS — R11.14 BILIOUS VOMITING WITH NAUSEA: ICD-10-CM

## 2022-02-15 DIAGNOSIS — F33.1 MAJOR DEPRESSIVE DISORDER, RECURRENT, MODERATE: ICD-10-CM

## 2022-02-15 DIAGNOSIS — F12.20 CANNABIS DEPENDENCE, UNCOMPLICATED: ICD-10-CM

## 2022-02-15 PROCEDURE — 1160F RVW MEDS BY RX/DR IN RCRD: CPT | Mod: CPTII,95,, | Performed by: NURSE PRACTITIONER

## 2022-02-15 PROCEDURE — 1159F PR MEDICATION LIST DOCUMENTED IN MEDICAL RECORD: ICD-10-PCS | Mod: CPTII,95,, | Performed by: NURSE PRACTITIONER

## 2022-02-15 PROCEDURE — 1159F MED LIST DOCD IN RCRD: CPT | Mod: CPTII,95,, | Performed by: NURSE PRACTITIONER

## 2022-02-15 PROCEDURE — 99214 OFFICE O/P EST MOD 30 MIN: CPT | Mod: 95,,, | Performed by: NURSE PRACTITIONER

## 2022-02-15 PROCEDURE — 1160F PR REVIEW ALL MEDS BY PRESCRIBER/CLIN PHARMACIST DOCUMENTED: ICD-10-PCS | Mod: CPTII,95,, | Performed by: NURSE PRACTITIONER

## 2022-02-15 PROCEDURE — 99214 PR OFFICE/OUTPT VISIT, EST, LEVL IV, 30-39 MIN: ICD-10-PCS | Mod: 95,,, | Performed by: NURSE PRACTITIONER

## 2022-02-15 RX ORDER — CLONAZEPAM 0.5 MG/1
0.5 TABLET ORAL 2 TIMES DAILY PRN
Qty: 60 TABLET | Refills: 1 | Status: SHIPPED | OUTPATIENT
Start: 2022-02-15 | End: 2022-05-18 | Stop reason: SDUPTHER

## 2022-02-15 RX ORDER — AMITRIPTYLINE HYDROCHLORIDE 25 MG/1
25 TABLET, FILM COATED ORAL NIGHTLY PRN
Qty: 30 TABLET | Refills: 1 | Status: SHIPPED | OUTPATIENT
Start: 2022-02-15 | End: 2022-04-18
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
## 2022-02-17 ENCOUNTER — OFFICE VISIT (OUTPATIENT)
Dept: OPHTHALMOLOGY | Facility: CLINIC | Age: 32
End: 2022-02-17
Payer: COMMERCIAL

## 2022-02-17 DIAGNOSIS — Z98.890 POST-OPERATIVE STATE: Primary | ICD-10-CM

## 2022-02-17 PROCEDURE — 99024 PR POST-OP FOLLOW-UP VISIT: ICD-10-PCS | Mod: S$GLB,,, | Performed by: OPHTHALMOLOGY

## 2022-02-17 PROCEDURE — 99999 PR PBB SHADOW E&M-EST. PATIENT-LVL II: CPT | Mod: PBBFAC,,, | Performed by: OPHTHALMOLOGY

## 2022-02-17 PROCEDURE — 1159F PR MEDICATION LIST DOCUMENTED IN MEDICAL RECORD: ICD-10-PCS | Mod: CPTII,S$GLB,, | Performed by: OPHTHALMOLOGY

## 2022-02-17 PROCEDURE — 1159F MED LIST DOCD IN RCRD: CPT | Mod: CPTII,S$GLB,, | Performed by: OPHTHALMOLOGY

## 2022-02-17 PROCEDURE — 99999 PR PBB SHADOW E&M-EST. PATIENT-LVL II: ICD-10-PCS | Mod: PBBFAC,,, | Performed by: OPHTHALMOLOGY

## 2022-02-17 PROCEDURE — 1160F RVW MEDS BY RX/DR IN RCRD: CPT | Mod: CPTII,S$GLB,, | Performed by: OPHTHALMOLOGY

## 2022-02-17 PROCEDURE — 1160F PR REVIEW ALL MEDS BY PRESCRIBER/CLIN PHARMACIST DOCUMENTED: ICD-10-PCS | Mod: CPTII,S$GLB,, | Performed by: OPHTHALMOLOGY

## 2022-02-17 PROCEDURE — 99024 POSTOP FOLLOW-UP VISIT: CPT | Mod: S$GLB,,, | Performed by: OPHTHALMOLOGY

## 2022-02-17 NOTE — PROGRESS NOTES
HPI     S/P Recess MR  OD 3.0 mm; to 13 from the limbus 12/29/2021    Pt states she feels like the left eye is still crossing.     No additional ocular complaints     No ocular meds    Last edited by Nancy Astudillo on 2/17/2022 10:05 AM. (History)            Assessment /Plan     For exam results, see Encounter Report.    Post-operative state      The patient has had the desired result of the surgical procedure.   Fusion near and distance   Normal stereo, 40 sec of arc.      Consider trial with OTC +1.00 to help with comfort when feeling the eye pulling.     RTC PRN     This service was scribed by Tasha Ramos for, and in the presence of Dr Cisneros who personally performed this service.    Tasha Ramos, COA    Raya Cisneros MD

## 2022-02-18 ENCOUNTER — OFFICE VISIT (OUTPATIENT)
Dept: PSYCHIATRY | Facility: CLINIC | Age: 32
End: 2022-02-18
Payer: COMMERCIAL

## 2022-02-18 DIAGNOSIS — F12.20 CANNABIS DEPENDENCE, UNCOMPLICATED: ICD-10-CM

## 2022-02-18 DIAGNOSIS — F43.10 PTSD (POST-TRAUMATIC STRESS DISORDER): Primary | ICD-10-CM

## 2022-02-18 DIAGNOSIS — F41.1 GAD (GENERALIZED ANXIETY DISORDER): ICD-10-CM

## 2022-02-18 PROCEDURE — 90834 PSYTX W PT 45 MINUTES: CPT | Mod: 95,,, | Performed by: SOCIAL WORKER

## 2022-02-18 PROCEDURE — 90834 PR PSYCHOTHERAPY W/PATIENT, 45 MIN: ICD-10-PCS | Mod: 95,,, | Performed by: SOCIAL WORKER

## 2022-02-18 NOTE — PROGRESS NOTES
Individual Psychotherapy (PhD/LCSW)    2/18/2022    The patient location is: [place], [place]  The chief complaint leading to consultation is: PTSD    Visit type: audiovisual    Face to Face time with patient: 45 minutes  60 minutes of total time spent on the encounter, which includes face to face time and non-face to face time preparing to see the patient (eg, review of tests), Obtaining and/or reviewing separately obtained history, Documenting clinical information in the electronic or other health record, Independently interpreting results (not separately reported) and communicating results to the patient/family/caregiver, or Care coordination (not separately reported).     Each patient to whom he or she provides medical services by telemedicine is:  (1) informed of the relationship between the physician and patient and the respective role of any other health care provider with respect to management of the patient; and (2) notified that he or she may decline to receive medical services by telemedicine and may withdraw from such care at any time.      Site:  [facility]         Therapeutic Intervention: Met with patient.  Outpatient - Insight oriented psychotherapy 45 min - CPT code 59821, Outpatient - Behavior modifying psychotherapy 45 min - CPT code 80529 and Outpatient - Supportive psychotherapy 45 min - CPT Code 27229    Chief complaint/reason for encounter: PTSD     Interval history and content of current session: Pt is a 31 year-old  white female who presents this afternoon for a follow up therapy session. Pt was AAOx4, pleasant and engaged. Pt reports that she had a good week. Pt states that she was successfully able to get the immigration documents from ex after explaining via text why she wanted to make copies. Pt navigated enmeshed relationship with mother. Clinician gently encouraging ways pt can set some limits so that pt is not exhausted by their conversations. Pt reports that she went  to a [employer] ball last weekend and had some sexually intimate moments with best friend/roommate. Pt reports that she is aware that having sex with her roommate would not be a good idea for multiple reasons but is also excited about having a close relationship with a woman where they both find each other attractive. Pt reports that she is looking forward to marching in [employer] next Thursday.  Pt reports that her [employer] has a rehearsal march this Sunday through the [place]. Pt reports that she feels prepared for a site visit at work on Monday and appears proud of this accomplishment as this will be her first year.      Treatment plan:  · Target symptoms: anxiety , adjustment, grief, work stress, PTSD  · Why chosen therapy is appropriate versus another modality: relevant to diagnosis, patient responds to this modality, evidence based practice  · Outcome monitoring methods: self-report, observation  · Therapeutic intervention type: insight oriented psychotherapy, behavior modifying psychotherapy, supportive psychotherapy    Risk parameters:  Patient reports no suicidal ideation  Patient reports no homicidal ideation  Patient reports no self-injurious behavior  Patient reports no violent behavior    Verbal deficits: None    Patient's response to intervention:  The patient's response to intervention is accepting.    Progress toward goals and other mental status changes:  The patient's progress toward goals is excellent.    Diagnosis:     ICD-10-CM ICD-9-CM   1. PTSD (post-traumatic stress disorder)  F43.10 309.81   2. SABI (generalized anxiety disorder)  F41.1 300.02   3. Cannabis dependence, uncomplicated  F12.20 304.30       Plan:  individual psychotherapy    Return to clinic: 1 week    Length of Service (minutes): 45

## 2022-02-25 ENCOUNTER — PATIENT MESSAGE (OUTPATIENT)
Dept: PSYCHIATRY | Facility: CLINIC | Age: 32
End: 2022-02-25
Payer: COMMERCIAL

## 2022-03-01 ENCOUNTER — PATIENT MESSAGE (OUTPATIENT)
Dept: PSYCHIATRY | Facility: CLINIC | Age: 32
End: 2022-03-01
Payer: COMMERCIAL

## 2022-03-02 ENCOUNTER — PATIENT MESSAGE (OUTPATIENT)
Dept: PSYCHIATRY | Facility: CLINIC | Age: 32
End: 2022-03-02
Payer: COMMERCIAL

## 2022-03-11 ENCOUNTER — OFFICE VISIT (OUTPATIENT)
Dept: PSYCHIATRY | Facility: CLINIC | Age: 32
End: 2022-03-11
Payer: COMMERCIAL

## 2022-03-11 DIAGNOSIS — F43.10 PTSD (POST-TRAUMATIC STRESS DISORDER): Primary | ICD-10-CM

## 2022-03-11 DIAGNOSIS — F33.1 MAJOR DEPRESSIVE DISORDER, RECURRENT, MODERATE: ICD-10-CM

## 2022-03-11 DIAGNOSIS — F41.1 GAD (GENERALIZED ANXIETY DISORDER): ICD-10-CM

## 2022-03-11 DIAGNOSIS — F12.20 CANNABIS DEPENDENCE, UNCOMPLICATED: ICD-10-CM

## 2022-03-11 PROCEDURE — 90834 PSYTX W PT 45 MINUTES: CPT | Mod: S$GLB,,, | Performed by: SOCIAL WORKER

## 2022-03-11 PROCEDURE — 90834 PR PSYCHOTHERAPY W/PATIENT, 45 MIN: ICD-10-PCS | Mod: S$GLB,,, | Performed by: SOCIAL WORKER

## 2022-03-13 NOTE — PROGRESS NOTES
Individual Psychotherapy (PhD/LCSW)    3/11/2022    [facility]         Therapeutic Intervention: Met with patient.  Outpatient - Insight oriented psychotherapy 45 min - CPT code 23750, Outpatient - Behavior modifying psychotherapy 45 min - CPT code 46952 and Outpatient - Supportive psychotherapy 45 min - CPT Code 33790    Chief complaint/reason for encounter: PTSD     Interval history and content of current session: Pt is a 31 year-old  white female who presents this afternoon for a follow up therapy session. Pt expressed a lot of sadness and anger related to recently finding out that her ex is now in a relationship with the woman he has been staying with since pt and ex split up over a year ago. Pt reports that she feels betrayed even though she knows rationally that it was not healthy to be with him. Pt also resentful as she is helping him with his visa. Pt utilized the majority of the session to problem solve how she will go about giving him the full responsibility of handling the visa from here on out. Pt plans to speak with their  to see if a meeting can be set up to discuss how pt can hand over the responsibility to her ex. Purpose of meeting is so that  can reinforce that it is no longer pt's responsibility and that ex is capable of handling it himself. Pt states that she will also likely file divorce papers sooner than the summer as this will help her to move on. Pt reports that she is frustrated with her finances and feels confused and angry with her mother and sister who had given her money in the past for  costs and for a previous GI procedure. Pt states that she was never told she owed them back up until recently. Pt's mother had offered to pay for pt's  recently, however pt not sure she can trust mother if she is now owing her for what she thought was a donation. Clinician advised pt to have a conversation with both sister and  mother to clarify if she does owe them. If she does, pt will have an easier time deciding if she can depend on her mother for financial support related to the divorce.     Treatment plan:  · Target symptoms: anxiety , adjustment, grief, work stress, PTSD  · Why chosen therapy is appropriate versus another modality: relevant to diagnosis, patient responds to this modality, evidence based practice  · Outcome monitoring methods: self-report, observation  · Therapeutic intervention type: insight oriented psychotherapy, behavior modifying psychotherapy, supportive psychotherapy    Risk parameters:  Patient reports no suicidal ideation  Patient reports no homicidal ideation  Patient reports no self-injurious behavior  Patient reports no violent behavior    Verbal deficits: None    Patient's response to intervention:  The patient's response to intervention is accepting.    Progress toward goals and other mental status changes:  The patient's progress toward goals is excellent.    Diagnosis:     ICD-10-CM ICD-9-CM   1. PTSD (post-traumatic stress disorder)  F43.10 309.81   2. SABI (generalized anxiety disorder)  F41.1 300.02   3. Cannabis dependence, uncomplicated  F12.20 304.30   4. Major depressive disorder, recurrent, moderate  F33.1 296.32       Plan:  individual psychotherapy    Return to clinic: 1 week    Length of Service (minutes): 45

## 2022-03-18 ENCOUNTER — OFFICE VISIT (OUTPATIENT)
Dept: PSYCHIATRY | Facility: CLINIC | Age: 32
End: 2022-03-18
Payer: COMMERCIAL

## 2022-03-18 DIAGNOSIS — F33.1 MAJOR DEPRESSIVE DISORDER, RECURRENT, MODERATE: ICD-10-CM

## 2022-03-18 DIAGNOSIS — F43.10 PTSD (POST-TRAUMATIC STRESS DISORDER): Primary | ICD-10-CM

## 2022-03-18 DIAGNOSIS — F41.1 GAD (GENERALIZED ANXIETY DISORDER): ICD-10-CM

## 2022-03-18 DIAGNOSIS — F12.20 CANNABIS DEPENDENCE, UNCOMPLICATED: ICD-10-CM

## 2022-03-18 PROCEDURE — 90834 PR PSYCHOTHERAPY W/PATIENT, 45 MIN: ICD-10-PCS | Mod: S$GLB,,, | Performed by: SOCIAL WORKER

## 2022-03-18 PROCEDURE — 90834 PSYTX W PT 45 MINUTES: CPT | Mod: S$GLB,,, | Performed by: SOCIAL WORKER
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
## 2022-03-21 ENCOUNTER — PATIENT MESSAGE (OUTPATIENT)
Dept: PSYCHIATRY | Facility: CLINIC | Age: 32
End: 2022-03-21
Payer: COMMERCIAL

## 2022-04-01 ENCOUNTER — OFFICE VISIT (OUTPATIENT)
Dept: PSYCHIATRY | Facility: CLINIC | Age: 32
End: 2022-04-01
Payer: COMMERCIAL

## 2022-04-01 DIAGNOSIS — F41.1 GAD (GENERALIZED ANXIETY DISORDER): ICD-10-CM

## 2022-04-01 DIAGNOSIS — F43.10 PTSD (POST-TRAUMATIC STRESS DISORDER): Primary | ICD-10-CM

## 2022-04-01 DIAGNOSIS — F33.1 MAJOR DEPRESSIVE DISORDER, RECURRENT, MODERATE: ICD-10-CM

## 2022-04-01 DIAGNOSIS — F12.20 CANNABIS DEPENDENCE, UNCOMPLICATED: ICD-10-CM

## 2022-04-01 PROCEDURE — 90834 PSYTX W PT 45 MINUTES: CPT | Mod: S$GLB,,, | Performed by: SOCIAL WORKER

## 2022-04-01 PROCEDURE — 90834 PR PSYCHOTHERAPY W/PATIENT, 45 MIN: ICD-10-PCS | Mod: S$GLB,,, | Performed by: SOCIAL WORKER

## 2022-04-04 NOTE — PROGRESS NOTES
Individual Psychotherapy (PhD/LCSW)    4/1/2022    Site:  [facility]         Therapeutic Intervention: Met with patient.  Outpatient - Insight oriented psychotherapy 45 min - CPT code 50667, Outpatient - Behavior modifying psychotherapy 45 min - CPT code 35718 and Outpatient - Supportive psychotherapy 45 min - CPT Code 33594    Chief complaint/reason for encounter: PTSD     Interval history and content of current session: Pt is a 31 year-old  white female who presents this afternoon for a follow up therapy session. Pt reports feeling like a weight has lifted since meeting up with nae Man to let him know that she is filing for divorce. Pt reports that ex handled this information much better than she thought. She reports that she notified him that she will no longer be responsible for managing his visa. Pt reports that he responded well to this. Pt states that she is still hurt by the fact that he is with another woman even though she rationally understands he was not healthy for her. Pt resentful that he was not more appreciative as she states he has never thanked her for the things she did for him to get him into the [place]. 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 Pt reports that she plans on focusing on her friends and family and is not interested in online dating at this time. Pt states that she has a lot of people she wants to spend time with, especially now that it is safer to meet up. Pt described a lot of plans for this weekend and is feeling positive and less depressed. Pt denies any recent stomach pains which pt reports were triggered by anxiety.      Treatment plan:  · Target symptoms: anxiety , adjustment, grief, work stress, PTSD  · Why chosen therapy is appropriate versus another modality: relevant to diagnosis, patient responds to this modality, evidence based practice  · Outcome monitoring  methods: self-report, observation  · Therapeutic intervention type: insight oriented psychotherapy, behavior modifying psychotherapy, supportive psychotherapy    Risk parameters:  Patient reports no suicidal ideation  Patient reports no homicidal ideation  Patient reports no self-injurious behavior  Patient reports no violent behavior    Verbal deficits: None    Patient's response to intervention:  The patient's response to intervention is accepting.    Progress toward goals and other mental status changes:  The patient's progress toward goals is excellent.    Diagnosis:     ICD-10-CM ICD-9-CM   1. PTSD (post-traumatic stress disorder)  F43.10 309.81   2. SABI (generalized anxiety disorder)  F41.1 300.02   3. Major depressive disorder, recurrent, moderate  F33.1 296.32   4. Cannabis dependence, uncomplicated  F12.20 304.30       Plan:  individual psychotherapy    Return to clinic: 1 week    Length of Service (minutes): 45

## 2022-04-08 ENCOUNTER — PATIENT MESSAGE (OUTPATIENT)
Dept: PSYCHIATRY | Facility: CLINIC | Age: 32
End: 2022-04-08
Payer: COMMERCIAL

## 2022-04-08 ENCOUNTER — OFFICE VISIT (OUTPATIENT)
Dept: PSYCHIATRY | Facility: CLINIC | Age: 32
End: 2022-04-08
Payer: COMMERCIAL

## 2022-04-08 DIAGNOSIS — F12.20 CANNABIS DEPENDENCE, UNCOMPLICATED: ICD-10-CM

## 2022-04-08 DIAGNOSIS — F43.10 PTSD (POST-TRAUMATIC STRESS DISORDER): Primary | ICD-10-CM

## 2022-04-08 DIAGNOSIS — F33.1 MAJOR DEPRESSIVE DISORDER, RECURRENT, MODERATE: ICD-10-CM

## 2022-04-08 DIAGNOSIS — F41.1 GAD (GENERALIZED ANXIETY DISORDER): ICD-10-CM

## 2022-04-08 PROCEDURE — 90834 PR PSYCHOTHERAPY W/PATIENT, 45 MIN: ICD-10-PCS | Mod: S$GLB,,, | Performed by: SOCIAL WORKER

## 2022-04-08 PROCEDURE — 90834 PSYTX W PT 45 MINUTES: CPT | Mod: S$GLB,,, | Performed by: SOCIAL WORKER

## 2022-04-08 NOTE — PROGRESS NOTES
Individual Psychotherapy (PhD/LCSW)    4/8/2022    Site:  [facility]         Therapeutic Intervention: Met with patient.  Outpatient - Insight oriented psychotherapy 45 min - CPT code 10235, Outpatient - Behavior modifying psychotherapy 45 min - CPT code 14322 and Outpatient - Supportive psychotherapy 45 min - CPT Code 00891    Chief complaint/reason for encounter: PTSD     Interval history and content of current session: Pt is a 31 year-old  white female who presents this afternoon for a follow up therapy session. Pt reports that she officially filed for divorce from  Donovan. Pt reports that he signed papers and she was able to submit them to the court house in [place] this past week. Pt reports a mixture of sadness and happiness. Pt reports that she was tearful when filling out the initial paperwork and felt a bit alone during the process. Pt was disappointed that sister and mom did not reach out by calling the pt. Pt and clinician discussed pt's expectations and how pt ultimately needs to communicate her needs to both mother and sister so that they can be better at giving her what she needs. Pt reports that she has good plans for the weekend with friends and is overall doing well. Pt states that she is especially proud of how she feels in her body and is feeling stronger and healthier right now.    Treatment plan:  · Target symptoms: anxiety , adjustment, grief, work stress, PTSD  · Why chosen therapy is appropriate versus another modality: relevant to diagnosis, patient responds to this modality, evidence based practice  · Outcome monitoring methods: self-report, observation  · Therapeutic intervention type: insight oriented psychotherapy, behavior modifying psychotherapy, supportive psychotherapy    Risk parameters:  Patient reports no suicidal ideation  Patient reports no homicidal ideation  Patient reports no self-injurious behavior  Patient reports no violent behavior    Verbal  deficits: None    Patient's response to intervention:  The patient's response to intervention is accepting.    Progress toward goals and other mental status changes:  The patient's progress toward goals is excellent.    Diagnosis:     ICD-10-CM ICD-9-CM   1. PTSD (post-traumatic stress disorder)  F43.10 309.81   2. SABI (generalized anxiety disorder)  F41.1 300.02   3. Major depressive disorder, recurrent, moderate  F33.1 296.32   4. Cannabis dependence, uncomplicated  F12.20 304.30       Plan:  individual psychotherapy    Return to clinic: 1 week    Length of Service (minutes): 45

## 2022-04-22 ENCOUNTER — OFFICE VISIT (OUTPATIENT)
Dept: PSYCHIATRY | Facility: CLINIC | Age: 32
End: 2022-04-22
Payer: COMMERCIAL

## 2022-04-22 DIAGNOSIS — F41.1 GAD (GENERALIZED ANXIETY DISORDER): ICD-10-CM

## 2022-04-22 DIAGNOSIS — F43.10 PTSD (POST-TRAUMATIC STRESS DISORDER): Primary | ICD-10-CM

## 2022-04-22 DIAGNOSIS — F12.20 CANNABIS DEPENDENCE, UNCOMPLICATED: ICD-10-CM

## 2022-04-22 DIAGNOSIS — F33.1 MAJOR DEPRESSIVE DISORDER, RECURRENT, MODERATE: ICD-10-CM

## 2022-04-22 PROCEDURE — 90834 PSYTX W PT 45 MINUTES: CPT | Mod: S$GLB,,, | Performed by: SOCIAL WORKER

## 2022-04-22 PROCEDURE — 90834 PR PSYCHOTHERAPY W/PATIENT, 45 MIN: ICD-10-PCS | Mod: S$GLB,,, | Performed by: SOCIAL WORKER

## 2022-04-22 NOTE — PROGRESS NOTES
Individual Psychotherapy (PhD/LCSW)    4/22/2022    Site:  [facility]         Therapeutic Intervention: Met with patient.  Outpatient - Insight oriented psychotherapy 45 min - CPT code 80057, Outpatient - Behavior modifying psychotherapy 45 min - CPT code 76851 and Outpatient - Supportive psychotherapy 45 min - CPT Code 41400    Chief complaint/reason for encounter: PTSD     Interval history and content of current session: Pt is a 31 year-old  white female who presents this afternoon for a follow up therapy session. Pt reports that she was successfully able to finalize divorce with ex  as of this Monday. Pt reports that she is very proud of herself for taking matters into her own hands and following through on a big decision in a responsible way. Clinician congratulated pt on this major milestone. Pt reports that she has started seeing another man, Holiness, and reports that he checks a lot of the boxes that ex didn't. Pt reports that Holiness eager to take things slow whereas pt often jumps to having sex within the first few days of meeting someone. Pt open to the idea of slowing things down and recognizes that instant gratification is not always fulfilling in the long term. Pt reports that she has good plans this weekend, specifically around attending  [employer]. Pt to follow next week and will then take a month off before seeing clinician again at the end of May.     Treatment plan:  · Target symptoms: anxiety , adjustment, grief, work stress, PTSD  · Why chosen therapy is appropriate versus another modality: relevant to diagnosis, patient responds to this modality, evidence based practice  · Outcome monitoring methods: self-report, observation  · Therapeutic intervention type: insight oriented psychotherapy, behavior modifying psychotherapy, supportive psychotherapy    Risk parameters:  Patient reports no suicidal ideation  Patient reports no homicidal ideation  Patient reports no  self-injurious behavior  Patient reports no violent behavior    Verbal deficits: None    Patient's response to intervention:  The patient's response to intervention is accepting.    Progress toward goals and other mental status changes:  The patient's progress toward goals is excellent.    Diagnosis:     ICD-10-CM ICD-9-CM   1. PTSD (post-traumatic stress disorder)  F43.10 309.81   2. SABI (generalized anxiety disorder)  F41.1 300.02   3. Major depressive disorder, recurrent, moderate  F33.1 296.32   4. Cannabis dependence, uncomplicated  F12.20 304.30       Plan:  individual psychotherapy    Return to clinic: 1 week    Length of Service (minutes): 45

## 2022-04-29 ENCOUNTER — OFFICE VISIT (OUTPATIENT)
Dept: PSYCHIATRY | Facility: CLINIC | Age: 32
End: 2022-04-29
Payer: COMMERCIAL

## 2022-04-29 DIAGNOSIS — F43.10 PTSD (POST-TRAUMATIC STRESS DISORDER): Primary | ICD-10-CM

## 2022-04-29 DIAGNOSIS — F12.20 CANNABIS DEPENDENCE, UNCOMPLICATED: ICD-10-CM

## 2022-04-29 DIAGNOSIS — F41.1 GAD (GENERALIZED ANXIETY DISORDER): ICD-10-CM

## 2022-04-29 DIAGNOSIS — F33.1 MAJOR DEPRESSIVE DISORDER, RECURRENT, MODERATE: ICD-10-CM

## 2022-04-29 PROCEDURE — 90834 PSYTX W PT 45 MINUTES: CPT | Mod: S$GLB,,, | Performed by: SOCIAL WORKER

## 2022-04-29 PROCEDURE — 90834 PR PSYCHOTHERAPY W/PATIENT, 45 MIN: ICD-10-PCS | Mod: S$GLB,,, | Performed by: SOCIAL WORKER
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
## 2022-05-18 ENCOUNTER — PATIENT MESSAGE (OUTPATIENT)
Dept: PSYCHIATRY | Facility: CLINIC | Age: 32
End: 2022-05-18
Payer: COMMERCIAL

## 2022-05-18 RX ORDER — AMITRIPTYLINE HYDROCHLORIDE 25 MG/1
25 TABLET, FILM COATED ORAL NIGHTLY
Qty: 30 TABLET | Refills: 1 | Status: SHIPPED | OUTPATIENT
Start: 2022-05-18 | End: 2022-07-18

## 2022-05-18 RX ORDER — CLONAZEPAM 0.5 MG/1
0.5 TABLET ORAL 2 TIMES DAILY PRN
Qty: 60 TABLET | Refills: 1 | Status: SHIPPED | OUTPATIENT
Start: 2022-05-18 | End: 2022-07-20 | Stop reason: SDUPTHER

## 2022-05-20 ENCOUNTER — OFFICE VISIT (OUTPATIENT)
Dept: PSYCHIATRY | Facility: CLINIC | Age: 32
End: 2022-05-20
Payer: COMMERCIAL

## 2022-05-20 DIAGNOSIS — F43.10 PTSD (POST-TRAUMATIC STRESS DISORDER): Primary | ICD-10-CM

## 2022-05-20 DIAGNOSIS — F33.1 MAJOR DEPRESSIVE DISORDER, RECURRENT, MODERATE: ICD-10-CM

## 2022-05-20 DIAGNOSIS — F41.1 GAD (GENERALIZED ANXIETY DISORDER): ICD-10-CM

## 2022-05-20 DIAGNOSIS — F12.20 CANNABIS DEPENDENCE, UNCOMPLICATED: ICD-10-CM

## 2022-05-20 PROCEDURE — 90834 PSYTX W PT 45 MINUTES: CPT | Mod: S$GLB,,, | Performed by: SOCIAL WORKER

## 2022-05-20 PROCEDURE — 90834 PR PSYCHOTHERAPY W/PATIENT, 45 MIN: ICD-10-PCS | Mod: S$GLB,,, | Performed by: SOCIAL WORKER

## 2022-05-20 NOTE — PROGRESS NOTES
Individual Psychotherapy (PhD/LCSW)    5/20/2022    Site:  [facility]         Therapeutic Intervention: Met with patient.  Outpatient - Insight oriented psychotherapy 45 min - CPT code 12234, Outpatient - Behavior modifying psychotherapy 45 min - CPT code 39461 and Outpatient - Supportive psychotherapy 45 min - CPT Code 47226    Chief complaint/reason for encounter: PTSD     Interval history and content of current session: Pt is a 31 year-old  white female who presents this afternoon for a follow up therapy session. Pt reports that she and Christien are now committed to being in a serious relationship. Pt states that they are spending a lot of time with each other and enjoying this. 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 Pt feeling good about the prospect of getting the job and will know more in the next few weeks. Pt reports that work has been stressful, especially as COVID has been surging and about 5% of students and staff are positive. Pt looking forward to summer and reports that she only has one week left of school. Pt planning on traveling to visit sister in [place] and then attending a wedding in [place]. Pt and clinician to meet again in a few weeks.     Treatment plan:  · Target symptoms: anxiety , adjustment, grief, work stress, PTSD  · Why chosen therapy is appropriate versus another modality: relevant to diagnosis, patient responds to this modality, evidence based practice  · Outcome monitoring methods: self-report, observation  · Therapeutic intervention type: insight oriented psychotherapy, behavior modifying psychotherapy, supportive psychotherapy    Risk parameters:  Patient reports no suicidal ideation  Patient reports no homicidal ideation  Patient reports no self-injurious behavior  Patient reports no violent behavior    Verbal deficits: None    Patient's response to intervention:  The patient's  response to intervention is accepting.    Progress toward goals and other mental status changes:  The patient's progress toward goals is excellent.    Diagnosis:     ICD-10-CM ICD-9-CM   1. PTSD (post-traumatic stress disorder)  F43.10 309.81   2. Major depressive disorder, recurrent, moderate  F33.1 296.32   3. SABI (generalized anxiety disorder)  F41.1 300.02   4. Cannabis dependence, uncomplicated  F12.20 304.30       Plan:  individual psychotherapy    Return to clinic: 1 week    Length of Service (minutes): 45

## 2022-06-02 ENCOUNTER — PATIENT MESSAGE (OUTPATIENT)
Dept: PSYCHIATRY | Facility: CLINIC | Age: 32
End: 2022-06-02
Payer: COMMERCIAL

## 2022-06-10 ENCOUNTER — PATIENT MESSAGE (OUTPATIENT)
Dept: PSYCHIATRY | Facility: CLINIC | Age: 32
End: 2022-06-10
Payer: COMMERCIAL

## 2022-06-10 ENCOUNTER — OFFICE VISIT (OUTPATIENT)
Dept: PSYCHIATRY | Facility: CLINIC | Age: 32
End: 2022-06-10
Payer: COMMERCIAL

## 2022-06-10 DIAGNOSIS — F33.1 MAJOR DEPRESSIVE DISORDER, RECURRENT, MODERATE: ICD-10-CM

## 2022-06-10 DIAGNOSIS — F41.1 GAD (GENERALIZED ANXIETY DISORDER): ICD-10-CM

## 2022-06-10 DIAGNOSIS — F43.10 PTSD (POST-TRAUMATIC STRESS DISORDER): Primary | ICD-10-CM

## 2022-06-10 DIAGNOSIS — F12.20 CANNABIS DEPENDENCE, UNCOMPLICATED: ICD-10-CM

## 2022-06-10 PROCEDURE — 90834 PSYTX W PT 45 MINUTES: CPT | Mod: S$GLB,,, | Performed by: SOCIAL WORKER

## 2022-06-10 PROCEDURE — 90834 PR PSYCHOTHERAPY W/PATIENT, 45 MIN: ICD-10-PCS | Mod: S$GLB,,, | Performed by: SOCIAL WORKER
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
## 2022-06-22 ENCOUNTER — OFFICE VISIT (OUTPATIENT)
Dept: PSYCHIATRY | Facility: CLINIC | Age: 32
End: 2022-06-22
Payer: COMMERCIAL

## 2022-06-22 DIAGNOSIS — F41.1 GAD (GENERALIZED ANXIETY DISORDER): ICD-10-CM

## 2022-06-22 DIAGNOSIS — F12.20 CANNABIS DEPENDENCE, UNCOMPLICATED: ICD-10-CM

## 2022-06-22 DIAGNOSIS — F33.1 MAJOR DEPRESSIVE DISORDER, RECURRENT, MODERATE: ICD-10-CM

## 2022-06-22 DIAGNOSIS — F43.10 PTSD (POST-TRAUMATIC STRESS DISORDER): Primary | ICD-10-CM

## 2022-06-22 PROCEDURE — 90834 PR PSYCHOTHERAPY W/PATIENT, 45 MIN: ICD-10-PCS | Mod: 95,,, | Performed by: SOCIAL WORKER

## 2022-06-22 PROCEDURE — 90834 PSYTX W PT 45 MINUTES: CPT | Mod: 95,,, | Performed by: SOCIAL WORKER

## 2022-06-22 NOTE — PROGRESS NOTES
"Individual Psychotherapy (PhD/LCSW)    6/22/2022    Site:  [facility]         Therapeutic Intervention: Met with patient.  Outpatient - Insight oriented psychotherapy 45 min - CPT code 55387, Outpatient - Behavior modifying psychotherapy 45 min - CPT code 32590 and Outpatient - Supportive psychotherapy 45 min - CPT Code 31836    Chief complaint/reason for encounter: PTSD     Interval history and content of current session: Pt is a 31 year-old  white female who presents this afternoon for a follow up therapy session. Pt reports some anticipatory anxiety about the fall when school starts back up. Pt reports that she has worked hard to stay grounded and focus on the present. Clinician validated pt's anxiety due to the nature of how stressful pt's job can be. Pt and clinician discussed that some of the fears pt has about next year are ultimately out of her control and that pt only has the power to manage what she can as one person. Pt also reports that she recently found out from an ex-boyfriend in [place] that her ex- was cheating on her with a [place] woman throughout the entirety of their courtship. Pt reports that she was very upset by this as she felt that everyone around her in the [place] community knew that he was cheating but didn't tell her. Pt feeling like "the stupid American." Pt and clinician processed these feelings and pt acknowledging that she was in a different frame of mind when she met her ex and was also quite young (25 years old). Pt states that she may speak with her friend about the feelings of betrayal and has been able to talk with her current boyfriend about this freely. Pt states that it has been grounding to talk to current boyfriend as he provides a lot of reassurance. Pt states that she and boyfriend have finally had penetrative sex and have said "I love you." Pt reports that he still struggles with erections and states that he has considered going to the doctor to get " evaluated for the issue. Pt feeling up for the challenge of continuing to help boyfriend feel more confident sexually and will continue providing support. Pt goes out of town for the next few weeks and will resume therapy when she returns.     Treatment plan:  · Target symptoms: anxiety , adjustment, grief, work stress, PTSD  · Why chosen therapy is appropriate versus another modality: relevant to diagnosis, patient responds to this modality, evidence based practice  · Outcome monitoring methods: self-report, observation  · Therapeutic intervention type: insight oriented psychotherapy, behavior modifying psychotherapy, supportive psychotherapy    Risk parameters:  Patient reports no suicidal ideation  Patient reports no homicidal ideation  Patient reports no self-injurious behavior  Patient reports no violent behavior    Verbal deficits: None    Patient's response to intervention:  The patient's response to intervention is accepting.    Progress toward goals and other mental status changes:  The patient's progress toward goals is excellent.    Diagnosis:     ICD-10-CM ICD-9-CM   1. PTSD (post-traumatic stress disorder)  F43.10 309.81   2. Major depressive disorder, recurrent, moderate  F33.1 296.32   3. SABI (generalized anxiety disorder)  F41.1 300.02   4. Cannabis dependence, uncomplicated  F12.20 304.30       Plan:  individual psychotherapy    Return to clinic: 2 weeks    Length of Service (minutes): 45

## 2022-07-15 ENCOUNTER — OFFICE VISIT (OUTPATIENT)
Dept: PSYCHIATRY | Facility: CLINIC | Age: 32
End: 2022-07-15
Payer: COMMERCIAL

## 2022-07-15 DIAGNOSIS — F33.1 MAJOR DEPRESSIVE DISORDER, RECURRENT, MODERATE: ICD-10-CM

## 2022-07-15 DIAGNOSIS — F12.20 CANNABIS DEPENDENCE, UNCOMPLICATED: ICD-10-CM

## 2022-07-15 DIAGNOSIS — F41.1 GAD (GENERALIZED ANXIETY DISORDER): ICD-10-CM

## 2022-07-15 DIAGNOSIS — F43.10 PTSD (POST-TRAUMATIC STRESS DISORDER): Primary | ICD-10-CM

## 2022-07-15 PROCEDURE — 90834 PSYTX W PT 45 MINUTES: CPT | Mod: S$GLB,,, | Performed by: SOCIAL WORKER

## 2022-07-15 PROCEDURE — 90834 PR PSYCHOTHERAPY W/PATIENT, 45 MIN: ICD-10-PCS | Mod: S$GLB,,, | Performed by: SOCIAL WORKER
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
## 2022-07-20 ENCOUNTER — OFFICE VISIT (OUTPATIENT)
Dept: PSYCHIATRY | Facility: CLINIC | Age: 32
End: 2022-07-20
Payer: COMMERCIAL

## 2022-07-20 ENCOUNTER — PATIENT MESSAGE (OUTPATIENT)
Dept: PSYCHIATRY | Facility: CLINIC | Age: 32
End: 2022-07-20
Payer: COMMERCIAL

## 2022-07-20 VITALS
SYSTOLIC BLOOD PRESSURE: 124 MMHG | HEART RATE: 91 BPM | BODY MASS INDEX: 20.01 KG/M2 | DIASTOLIC BLOOD PRESSURE: 73 MMHG | WEIGHT: 131.63 LBS

## 2022-07-20 DIAGNOSIS — F43.10 PTSD (POST-TRAUMATIC STRESS DISORDER): ICD-10-CM

## 2022-07-20 DIAGNOSIS — F41.1 GAD (GENERALIZED ANXIETY DISORDER): Primary | ICD-10-CM

## 2022-07-20 DIAGNOSIS — F33.1 MAJOR DEPRESSIVE DISORDER, RECURRENT, MODERATE: ICD-10-CM

## 2022-07-20 PROCEDURE — 99999 PR PBB SHADOW E&M-EST. PATIENT-LVL III: CPT | Mod: PBBFAC,,, | Performed by: NURSE PRACTITIONER

## 2022-07-20 PROCEDURE — 3008F BODY MASS INDEX DOCD: CPT | Mod: CPTII,S$GLB,, | Performed by: NURSE PRACTITIONER

## 2022-07-20 PROCEDURE — 99214 OFFICE O/P EST MOD 30 MIN: CPT | Mod: S$GLB,,, | Performed by: NURSE PRACTITIONER

## 2022-07-20 PROCEDURE — 3078F DIAST BP <80 MM HG: CPT | Mod: CPTII,S$GLB,, | Performed by: NURSE PRACTITIONER

## 2022-07-20 PROCEDURE — 3078F PR MOST RECENT DIASTOLIC BLOOD PRESSURE < 80 MM HG: ICD-10-PCS | Mod: CPTII,S$GLB,, | Performed by: NURSE PRACTITIONER

## 2022-07-20 PROCEDURE — 99999 PR PBB SHADOW E&M-EST. PATIENT-LVL III: ICD-10-PCS | Mod: PBBFAC,,, | Performed by: NURSE PRACTITIONER

## 2022-07-20 PROCEDURE — 3074F PR MOST RECENT SYSTOLIC BLOOD PRESSURE < 130 MM HG: ICD-10-PCS | Mod: CPTII,S$GLB,, | Performed by: NURSE PRACTITIONER

## 2022-07-20 PROCEDURE — 3008F PR BODY MASS INDEX (BMI) DOCUMENTED: ICD-10-PCS | Mod: CPTII,S$GLB,, | Performed by: NURSE PRACTITIONER

## 2022-07-20 PROCEDURE — 1159F PR MEDICATION LIST DOCUMENTED IN MEDICAL RECORD: ICD-10-PCS | Mod: CPTII,S$GLB,, | Performed by: NURSE PRACTITIONER

## 2022-07-20 PROCEDURE — 1160F PR REVIEW ALL MEDS BY PRESCRIBER/CLIN PHARMACIST DOCUMENTED: ICD-10-PCS | Mod: CPTII,S$GLB,, | Performed by: NURSE PRACTITIONER

## 2022-07-20 PROCEDURE — 1159F MED LIST DOCD IN RCRD: CPT | Mod: CPTII,S$GLB,, | Performed by: NURSE PRACTITIONER

## 2022-07-20 PROCEDURE — 99214 PR OFFICE/OUTPT VISIT, EST, LEVL IV, 30-39 MIN: ICD-10-PCS | Mod: S$GLB,,, | Performed by: NURSE PRACTITIONER

## 2022-07-20 PROCEDURE — 1160F RVW MEDS BY RX/DR IN RCRD: CPT | Mod: CPTII,S$GLB,, | Performed by: NURSE PRACTITIONER

## 2022-07-20 PROCEDURE — 3074F SYST BP LT 130 MM HG: CPT | Mod: CPTII,S$GLB,, | Performed by: NURSE PRACTITIONER

## 2022-07-20 RX ORDER — AMITRIPTYLINE HYDROCHLORIDE 25 MG/1
25 TABLET, FILM COATED ORAL NIGHTLY
Qty: 90 TABLET | Refills: 1 | Status: SHIPPED | OUTPATIENT
Start: 2022-07-20 | End: 2023-02-23

## 2022-07-20 RX ORDER — CLONAZEPAM 0.5 MG/1
0.5 TABLET ORAL 2 TIMES DAILY PRN
Qty: 60 TABLET | Refills: 3 | Status: SHIPPED | OUTPATIENT
Start: 2022-07-20 | End: 2023-02-02 | Stop reason: SDUPTHER
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
## 2022-07-22 ENCOUNTER — OFFICE VISIT (OUTPATIENT)
Dept: PSYCHIATRY | Facility: CLINIC | Age: 32
End: 2022-07-22
Payer: COMMERCIAL

## 2022-07-22 DIAGNOSIS — F41.1 GAD (GENERALIZED ANXIETY DISORDER): ICD-10-CM

## 2022-07-22 DIAGNOSIS — F33.1 MAJOR DEPRESSIVE DISORDER, RECURRENT, MODERATE: Primary | ICD-10-CM

## 2022-07-22 DIAGNOSIS — F43.10 PTSD (POST-TRAUMATIC STRESS DISORDER): ICD-10-CM

## 2022-07-22 DIAGNOSIS — F12.20 CANNABIS DEPENDENCE, UNCOMPLICATED: ICD-10-CM

## 2022-07-22 PROCEDURE — 90834 PSYTX W PT 45 MINUTES: CPT | Mod: S$GLB,,, | Performed by: SOCIAL WORKER

## 2022-07-22 PROCEDURE — 90834 PR PSYCHOTHERAPY W/PATIENT, 45 MIN: ICD-10-PCS | Mod: S$GLB,,, | Performed by: SOCIAL WORKER

## 2022-07-22 NOTE — PROGRESS NOTES
"Individual Psychotherapy (PhD/LCSW)    7/22/2022    Site:  [facility]         Therapeutic Intervention: Met with patient.  Outpatient - Insight oriented psychotherapy 45 min - CPT code 97674, Outpatient - Behavior modifying psychotherapy 45 min - CPT code 68273 and Outpatient - Supportive psychotherapy 45 min - CPT Code 90255    Chief complaint/reason for encounter: PTSD     Interval history and content of current session: Pt is a 31 year-old  white female who presents this afternoon for a follow up therapy session. Pt tearful this afternoon, stating that she started having stomach pains this morning. This is the first time pt has experienced the pains since the Spring. 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 Pt states that she will likely feel better once the movers arrive on Monday to move furniture around but does not like having a lack of control over the disorganization. Pt states that she plans on increasing her Klonopin to the dose she is prescribed but feels frustrated by this. Clinician and pt discussed other ways for coping through the stress. Pt to plan a few minutes during each day to get out some of her frustrations on paper. Pt states that this might be helpful as she doesn't have anyone to talk to about the specifics of school stress. Pt feels less inclined to talk about these things with friends and family because it is "boring." Pt and clinician discussed how pt can externalize the stress and find a space for it until she is in the present moment at the school. Pt to try these techniques before next session.     Treatment plan:  · Target symptoms: anxiety , adjustment, grief, work stress, PTSD  · Why chosen therapy is appropriate versus another modality: relevant to diagnosis, patient responds to this modality, evidence based practice  · Outcome monitoring " methods: self-report, observation  · Therapeutic intervention type: insight oriented psychotherapy, behavior modifying psychotherapy, supportive psychotherapy    Risk parameters:  Patient reports no suicidal ideation  Patient reports no homicidal ideation  Patient reports no self-injurious behavior  Patient reports no violent behavior    Verbal deficits: None    Patient's response to intervention:  The patient's response to intervention is accepting.    Progress toward goals and other mental status changes:  The patient's progress toward goals is excellent.    Diagnosis:     ICD-10-CM ICD-9-CM   1. Major depressive disorder, recurrent, moderate  F33.1 296.32   2. SABI (generalized anxiety disorder)  F41.1 300.02   3. PTSD (post-traumatic stress disorder)  F43.10 309.81   4. Cannabis dependence, uncomplicated  F12.20 304.30       Plan:  individual psychotherapy    Return to clinic: 2 weeks    Length of Service (minutes): 45

## 2022-08-19 ENCOUNTER — PATIENT MESSAGE (OUTPATIENT)
Dept: PSYCHIATRY | Facility: CLINIC | Age: 32
End: 2022-08-19
Payer: COMMERCIAL

## 2022-09-06 ENCOUNTER — OFFICE VISIT (OUTPATIENT)
Dept: PSYCHIATRY | Facility: CLINIC | Age: 32
End: 2022-09-06
Payer: COMMERCIAL

## 2022-09-06 DIAGNOSIS — F12.20 CANNABIS DEPENDENCE, UNCOMPLICATED: ICD-10-CM

## 2022-09-06 DIAGNOSIS — F43.10 PTSD (POST-TRAUMATIC STRESS DISORDER): ICD-10-CM

## 2022-09-06 DIAGNOSIS — F33.1 MAJOR DEPRESSIVE DISORDER, RECURRENT, MODERATE: Primary | ICD-10-CM

## 2022-09-06 DIAGNOSIS — F41.1 GAD (GENERALIZED ANXIETY DISORDER): ICD-10-CM

## 2022-09-06 PROCEDURE — 90834 PR PSYCHOTHERAPY W/PATIENT, 45 MIN: ICD-10-PCS | Mod: S$GLB,,, | Performed by: SOCIAL WORKER

## 2022-09-06 PROCEDURE — 90834 PSYTX W PT 45 MINUTES: CPT | Mod: S$GLB,,, | Performed by: SOCIAL WORKER

## 2022-09-06 NOTE — PROGRESS NOTES
Individual Psychotherapy (PhD/LCSW)    9/6/2022    Site:  [facility]         Therapeutic Intervention: Met with patient.  Outpatient - Insight oriented psychotherapy 45 min - CPT code 64480, Outpatient - Behavior modifying psychotherapy 45 min - CPT code 99056 and Outpatient - Supportive psychotherapy 45 min - CPT Code 32949    Chief complaint/reason for encounter: PTSD     Interval history and content of current session: Pt is a 31 year-old  white female who presents this afternoon for a follow up therapy session. Pt tearful as she states she is finally able to process her feelings around her mother's recent suicidal ideation from two weeks ago. Pt states that her mother called the Suicide Risk Hotline and notified pt's best friend Mauricio. Pt reports that she has noticed a decline in her mother's mood and believes her mother is not showering regularly. Pt reports that on the flip side, her mother got a job at  [employer] due to financial strain. Pt states that she is proud of her for that but burned out having to worry about her mother. Pt also reports stress at school and feels disconnected from coworkers. Pt states that most of her friends who worked there last year left. Pt thinking of doing an office/admin job where she could make more money. Pt states that she would like to consider this type of job for a few years and then jump back into something she is passionate about. Pt reports that her relationship with her boyfriend is going well and states that this has been the one positive thing. Pt is going back to the gym and plans on keeping that a part of her routine. Pt back on the schedule with clinician for weekly visits.     Treatment plan:  Target symptoms: anxiety , adjustment, grief, work stress, PTSD  Why chosen therapy is appropriate versus another modality: relevant to diagnosis, patient responds to this modality, evidence based practice  Outcome monitoring methods: self-report,  observation  Therapeutic intervention type: insight oriented psychotherapy, behavior modifying psychotherapy, supportive psychotherapy    Risk parameters:  Patient reports no suicidal ideation  Patient reports no homicidal ideation  Patient reports no self-injurious behavior  Patient reports no violent behavior    Verbal deficits: None    Patient's response to intervention:  The patient's response to intervention is accepting.    Progress toward goals and other mental status changes:  The patient's progress toward goals is excellent.    Diagnosis:     ICD-10-CM ICD-9-CM   1. Major depressive disorder, recurrent, moderate  F33.1 296.32   2. SABI (generalized anxiety disorder)  F41.1 300.02   3. PTSD (post-traumatic stress disorder)  F43.10 309.81   4. Cannabis dependence, uncomplicated  F12.20 304.30       Plan:  individual psychotherapy    Return to clinic: 2 weeks    Length of Service (minutes): 45

## 2022-09-13 ENCOUNTER — OFFICE VISIT (OUTPATIENT)
Dept: PSYCHIATRY | Facility: CLINIC | Age: 32
End: 2022-09-13
Payer: COMMERCIAL

## 2022-09-13 DIAGNOSIS — F33.1 MAJOR DEPRESSIVE DISORDER, RECURRENT, MODERATE: Primary | ICD-10-CM

## 2022-09-13 DIAGNOSIS — F12.20 CANNABIS DEPENDENCE, UNCOMPLICATED: ICD-10-CM

## 2022-09-13 DIAGNOSIS — F41.1 GAD (GENERALIZED ANXIETY DISORDER): ICD-10-CM

## 2022-09-13 DIAGNOSIS — F43.10 PTSD (POST-TRAUMATIC STRESS DISORDER): ICD-10-CM

## 2022-09-13 PROCEDURE — 90834 PSYTX W PT 45 MINUTES: CPT | Mod: S$GLB,,, | Performed by: SOCIAL WORKER

## 2022-09-13 PROCEDURE — 90834 PR PSYCHOTHERAPY W/PATIENT, 45 MIN: ICD-10-PCS | Mod: S$GLB,,, | Performed by: SOCIAL WORKER

## 2022-09-13 NOTE — PROGRESS NOTES
Individual Psychotherapy (PhD/LCSW)    9/13/2022    Site:  [facility]         Therapeutic Intervention: Met with patient.  Outpatient - Insight oriented psychotherapy 45 min - CPT code 45122, Outpatient - Behavior modifying psychotherapy 45 min - CPT code 85994 and Outpatient - Supportive psychotherapy 45 min - CPT Code 81379    Chief complaint/reason for encounter: PTSD     Interval history and content of current session: Pt is a 31 year-old  white female who presents this afternoon for a follow up therapy session. Pt reports that she is feeling more down this past week. Saw ex-'s girlfriend at a bar she went to with friends. Pt states that she was able to make sure this woman was not their  and felt well supported by friends. Pt states that she has been drinking more than typical. Does not want to stop alcohol use right now as it is easier to avoid the feelings. Pt unsure of what she is afraid of in avoiding her feelings. Will continue processing this. Pt and clinician discussed journaling, however pt is not interested in that right now. 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 Unsure if she will get it but feels it will be good to apply regardless. Going camping with boyfriend this weekend and is looking forward to that. Pt reports some resentment from sister who is in [place] and frustrated with pt for not checking in with her more often. Pt feeling resentful as she is overwhelmed and burned out at work, struggling emotionally. Pt and clinician to revisit this during next session.     Treatment plan:  Target symptoms: anxiety , adjustment, grief, work stress, PTSD  Why chosen therapy is appropriate versus another modality: relevant to diagnosis, patient responds to this modality, evidence based practice  Outcome monitoring methods: self-report, observation  Therapeutic intervention type: insight oriented psychotherapy, behavior  modifying psychotherapy, supportive psychotherapy    Risk parameters:  Patient reports no suicidal ideation  Patient reports no homicidal ideation  Patient reports no self-injurious behavior  Patient reports no violent behavior    Verbal deficits: None    Patient's response to intervention:  The patient's response to intervention is accepting.    Progress toward goals and other mental status changes:  The patient's progress toward goals is excellent.    Diagnosis:     ICD-10-CM ICD-9-CM   1. Major depressive disorder, recurrent, moderate  F33.1 296.32   2. SABI (generalized anxiety disorder)  F41.1 300.02   3. PTSD (post-traumatic stress disorder)  F43.10 309.81   4. Cannabis dependence, uncomplicated  F12.20 304.30       Plan:  individual psychotherapy    Return to clinic: 2 weeks    Length of Service (minutes): 45

## 2022-09-20 ENCOUNTER — OFFICE VISIT (OUTPATIENT)
Dept: PSYCHIATRY | Facility: CLINIC | Age: 32
End: 2022-09-20
Payer: COMMERCIAL

## 2022-09-20 DIAGNOSIS — F41.1 GAD (GENERALIZED ANXIETY DISORDER): ICD-10-CM

## 2022-09-20 DIAGNOSIS — F33.1 MAJOR DEPRESSIVE DISORDER, RECURRENT, MODERATE: Primary | ICD-10-CM

## 2022-09-20 DIAGNOSIS — F43.10 PTSD (POST-TRAUMATIC STRESS DISORDER): ICD-10-CM

## 2022-09-20 DIAGNOSIS — F12.20 CANNABIS DEPENDENCE, UNCOMPLICATED: ICD-10-CM

## 2022-09-20 PROCEDURE — 90834 PSYTX W PT 45 MINUTES: CPT | Mod: S$GLB,,, | Performed by: SOCIAL WORKER

## 2022-09-20 PROCEDURE — 90834 PR PSYCHOTHERAPY W/PATIENT, 45 MIN: ICD-10-PCS | Mod: S$GLB,,, | Performed by: SOCIAL WORKER

## 2022-09-20 NOTE — PROGRESS NOTES
Individual Psychotherapy (PhD/LCSW)    9/20/2022    Site:  [facility]         Therapeutic Intervention: Met with patient.  Outpatient - Insight oriented psychotherapy 45 min - CPT code 69036, Outpatient - Behavior modifying psychotherapy 45 min - CPT code 10853 and Outpatient - Supportive psychotherapy 45 min - CPT Code 39636    Chief complaint/reason for encounter: PTSD     Interval history and content of current session: Pt is a 31 year-old  white female who presents this afternoon for a follow up therapy session. Pt appearing more grounded today. Reports that she and boyfriend had a positive experience going on their first camping trip together. Pt reports that they did get on each other's nerves some, however pt states that they were able to communicate about this. 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 Pt still interested in looking for other jobs but is also focusing on the present moment. Pt feels some grogginess in the mornings. She has not yet tried to take away alcohol and is open to seeing if this makes a difference. Pt and clinician discussed that some of the morning grogginess could also be situational depression related to her job. Pt states that her mother is slightly more stable, working at  [employer] but not happy there. They are discussing mother moving [place] on [place] [facility] [place].     Treatment plan:  Target symptoms: anxiety , adjustment, grief, work stress, PTSD  Why chosen therapy is appropriate versus another modality: relevant to diagnosis, patient responds to this modality, evidence based practice  Outcome monitoring methods: self-report, observation  Therapeutic intervention type: insight oriented psychotherapy, behavior modifying psychotherapy, supportive psychotherapy    Risk parameters:  Patient reports no suicidal ideation  Patient reports no homicidal ideation  Patient reports no self-injurious  behavior  Patient reports no violent behavior    Verbal deficits: None    Patient's response to intervention:  The patient's response to intervention is accepting.    Progress toward goals and other mental status changes:  The patient's progress toward goals is excellent.    Diagnosis:     ICD-10-CM ICD-9-CM   1. Major depressive disorder, recurrent, moderate  F33.1 296.32   2. SABI (generalized anxiety disorder)  F41.1 300.02   3. PTSD (post-traumatic stress disorder)  F43.10 309.81   4. Cannabis dependence, uncomplicated  F12.20 304.30       Plan:  individual psychotherapy    Return to clinic: 2 weeks    Length of Service (minutes): 45

## 2022-09-27 ENCOUNTER — OFFICE VISIT (OUTPATIENT)
Dept: PSYCHIATRY | Facility: CLINIC | Age: 32
End: 2022-09-27
Payer: COMMERCIAL

## 2022-09-27 DIAGNOSIS — F43.10 PTSD (POST-TRAUMATIC STRESS DISORDER): ICD-10-CM

## 2022-09-27 DIAGNOSIS — F12.20 CANNABIS DEPENDENCE, UNCOMPLICATED: ICD-10-CM

## 2022-09-27 DIAGNOSIS — F33.1 MAJOR DEPRESSIVE DISORDER, RECURRENT, MODERATE: Primary | ICD-10-CM

## 2022-09-27 DIAGNOSIS — F41.1 GAD (GENERALIZED ANXIETY DISORDER): ICD-10-CM

## 2022-09-27 PROCEDURE — 90834 PSYTX W PT 45 MINUTES: CPT | Mod: S$GLB,,, | Performed by: SOCIAL WORKER

## 2022-09-27 PROCEDURE — 90834 PR PSYCHOTHERAPY W/PATIENT, 45 MIN: ICD-10-PCS | Mod: S$GLB,,, | Performed by: SOCIAL WORKER

## 2022-09-28 NOTE — PROGRESS NOTES
Individual Psychotherapy (PhD/LCSW)    9/27/2022    Site:  [facility]         Therapeutic Intervention: Met with patient.  Outpatient - Insight oriented psychotherapy 45 min - CPT code 43331, Outpatient - Behavior modifying psychotherapy 45 min - CPT code 76640 and Outpatient - Supportive psychotherapy 45 min - CPT Code 52790    Chief complaint/reason for encounter: PTSD     Interval history and content of current session: Pt is a 31 year-old  white female who presents this afternoon for a follow up therapy session. Pt reports that she is feeling frustrated with gaining weight. Pt recognizing that her weight is healthy however reports a lot of social pressure growing up in [place], [place] to be thin. Pt states that she believes some of her depression/fatigue in the morning is related to not looking forward to work. Pt reports that she has done a better job of limiting alcohol. Didn't drink for one or two days this past week. Pt states that she notice difficulty sleeping on Saturday night. Pt reports that she has some anxiety about financial issues related to her and mother's mortgages. Pt states that she plans on talking to her mother but wants to wait until they get a renter into one of their properties. Pt agrees that if she is more involved in the details of their finances, this might ultimately decrease her stress. Pt reports that things are going well with boyfriend. Pt looking forward to October when she has more social plans. Working on getting better at making time for friend Mauricio.     Treatment plan:  Target symptoms: anxiety , adjustment, grief, work stress, PTSD  Why chosen therapy is appropriate versus another modality: relevant to diagnosis, patient responds to this modality, evidence based practice  Outcome monitoring methods: self-report, observation  Therapeutic intervention type: insight oriented psychotherapy, behavior modifying psychotherapy, supportive psychotherapy    Risk  parameters:  Patient reports no suicidal ideation  Patient reports no homicidal ideation  Patient reports no self-injurious behavior  Patient reports no violent behavior    Verbal deficits: None    Patient's response to intervention:  The patient's response to intervention is accepting.    Progress toward goals and other mental status changes:  The patient's progress toward goals is excellent.    Diagnosis:     ICD-10-CM ICD-9-CM   1. Major depressive disorder, recurrent, moderate  F33.1 296.32   2. SABI (generalized anxiety disorder)  F41.1 300.02   3. PTSD (post-traumatic stress disorder)  F43.10 309.81   4. Cannabis dependence, uncomplicated  F12.20 304.30       Plan:  individual psychotherapy    Return to clinic: 2 weeks    Length of Service (minutes): 45

## 2022-10-04 ENCOUNTER — OFFICE VISIT (OUTPATIENT)
Dept: PSYCHIATRY | Facility: CLINIC | Age: 32
End: 2022-10-04
Payer: COMMERCIAL

## 2022-10-04 DIAGNOSIS — F41.1 GAD (GENERALIZED ANXIETY DISORDER): ICD-10-CM

## 2022-10-04 DIAGNOSIS — F43.10 PTSD (POST-TRAUMATIC STRESS DISORDER): ICD-10-CM

## 2022-10-04 DIAGNOSIS — F33.1 MAJOR DEPRESSIVE DISORDER, RECURRENT, MODERATE: Primary | ICD-10-CM

## 2022-10-04 DIAGNOSIS — F12.20 CANNABIS DEPENDENCE, UNCOMPLICATED: ICD-10-CM

## 2022-10-04 PROCEDURE — 90834 PR PSYCHOTHERAPY W/PATIENT, 45 MIN: ICD-10-PCS | Mod: S$GLB,,, | Performed by: SOCIAL WORKER

## 2022-10-04 PROCEDURE — 90834 PSYTX W PT 45 MINUTES: CPT | Mod: S$GLB,,, | Performed by: SOCIAL WORKER

## 2022-10-04 NOTE — PROGRESS NOTES
Individual Psychotherapy (PhD/LCSW)    10/4/2022    Site:  [facility]         Therapeutic Intervention: Met with patient.  Outpatient - Insight oriented psychotherapy 45 min - CPT code 39884, Outpatient - Behavior modifying psychotherapy 45 min - CPT code 62309 and Outpatient - Supportive psychotherapy 45 min - CPT Code 67667    Chief complaint/reason for encounter: PTSD     Interval history and content of current session: Pt is a 31 year-old  white female who presents this afternoon for a follow up therapy session. Pt appears less anxious today, a little more grounded. Stating she continues to feel depressed upon waking for work and exhausted by the end of the day. Still frustrated with the weight gain. Pt and clinician discussed pt taking some days off from work so that she can focus on goal of applying to other jobs as pt struggling to do this during the week and on weekends when she already has plans. 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 Pt reports that she is relieved that her and mother's rental is finally going on the market. She reports that once they start getting rent payments from a tenant, they can start paying off some of the debts. Pt still planning to have a conversation with her mother about finances but is waiting until the tenant begins paying rent. Pt reports a lot of plans this month and continues to look forward to this.     Treatment plan:  Target symptoms: anxiety , adjustment, grief, work stress, PTSD  Why chosen therapy is appropriate versus another modality: relevant to diagnosis, patient responds to this modality, evidence based practice  Outcome monitoring methods: self-report, observation  Therapeutic intervention type: insight oriented psychotherapy, behavior modifying psychotherapy, supportive psychotherapy    Risk parameters:  Patient reports no suicidal ideation  Patient reports no homicidal ideation  Patient  reports no self-injurious behavior  Patient reports no violent behavior    Verbal deficits: None    Patient's response to intervention:  The patient's response to intervention is accepting.    Progress toward goals and other mental status changes:  The patient's progress toward goals is excellent.    Diagnosis:     ICD-10-CM ICD-9-CM   1. Major depressive disorder, recurrent, moderate  F33.1 296.32   2. SABI (generalized anxiety disorder)  F41.1 300.02   3. PTSD (post-traumatic stress disorder)  F43.10 309.81   4. Cannabis dependence, uncomplicated  F12.20 304.30       Plan:  individual psychotherapy    Return to clinic: 2 weeks    Length of Service (minutes): 45

## 2022-10-11 ENCOUNTER — PATIENT MESSAGE (OUTPATIENT)
Dept: PSYCHIATRY | Facility: CLINIC | Age: 32
End: 2022-10-11
Payer: COMMERCIAL

## 2022-10-18 ENCOUNTER — OFFICE VISIT (OUTPATIENT)
Dept: PSYCHIATRY | Facility: CLINIC | Age: 32
End: 2022-10-18
Payer: COMMERCIAL

## 2022-10-18 DIAGNOSIS — F41.1 GAD (GENERALIZED ANXIETY DISORDER): ICD-10-CM

## 2022-10-18 DIAGNOSIS — F43.10 PTSD (POST-TRAUMATIC STRESS DISORDER): ICD-10-CM

## 2022-10-18 DIAGNOSIS — F33.1 MAJOR DEPRESSIVE DISORDER, RECURRENT, MODERATE: Primary | ICD-10-CM

## 2022-10-18 PROCEDURE — 90834 PR PSYCHOTHERAPY W/PATIENT, 45 MIN: ICD-10-PCS | Mod: S$GLB,,, | Performed by: SOCIAL WORKER

## 2022-10-18 PROCEDURE — 90834 PSYTX W PT 45 MINUTES: CPT | Mod: S$GLB,,, | Performed by: SOCIAL WORKER

## 2022-10-18 NOTE — PROGRESS NOTES
"Individual Psychotherapy (PhD/LCSW)    10/18/2022    Site:  [facility]         Therapeutic Intervention: Met with patient.  Outpatient - Insight oriented psychotherapy 45 min - CPT code 05954, Outpatient - Behavior modifying psychotherapy 45 min - CPT code 92661 and Outpatient - Supportive psychotherapy 45 min - CPT Code 63383    Chief complaint/reason for encounter: PTSD     Interval history and content of current session: Pt is a 32 year-old single white female who presents this afternoon for a follow up therapy session. Pt reports that she had a good two weeks with friends and family celebrating her birthday. Pt reports that her best friend who currently lives in [place] came in town with her boyfriend to surprise pt. Pt reports that this has been rejuvenating. Pt reports that prior to the birthday celebration, her school was on a "soft lockdown." Pt describes feeling dismissed by multiple staff members/employees who were trying to leave the building during the lockdown. Pt states that she had to refuse people's requests to leave due to the safety measures that prevent people from coming or leaving the building during a lockdown. 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 Pt feeling angry and described wanting to throw her hands up. Clinician and pt discussed strategies for professional communication around the situation; asking for a meeting to clarify protocols and the person in charge of enforcing protocols would a "soft lockdown" happen again. Pt still hoping to find a new job in the next few months. 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 Pt tearful when talking about her father toward the end of the session. She states that she read his card to her from her 23st birthday. She reports that it is always difficult to read as he is no longer here. Pt to consider responding to the card during " her next session. Pt requesting that clinician be present as pt fearful she will experience a lot of emotional pain. Pt headed back to school tomorrow. She reports good plans tonight with her friend Mauricio. They are getting massages and then meeting up with a friend for Chinese.     Treatment plan:  Target symptoms: anxiety , adjustment, grief, work stress, PTSD  Why chosen therapy is appropriate versus another modality: relevant to diagnosis, patient responds to this modality, evidence based practice  Outcome monitoring methods: self-report, observation  Therapeutic intervention type: insight oriented psychotherapy, behavior modifying psychotherapy, supportive psychotherapy    Risk parameters:  Patient reports no suicidal ideation  Patient reports no homicidal ideation  Patient reports no self-injurious behavior  Patient reports no violent behavior    Verbal deficits: None    Patient's response to intervention:  The patient's response to intervention is accepting.    Progress toward goals and other mental status changes:  The patient's progress toward goals is excellent.    Diagnosis:     ICD-10-CM ICD-9-CM   1. Major depressive disorder, recurrent, moderate  F33.1 296.32   2. SABI (generalized anxiety disorder)  F41.1 300.02   3. PTSD (post-traumatic stress disorder)  F43.10 309.81       Plan:  individual psychotherapy    Return to clinic: 2 weeks    Length of Service (minutes): 45

## 2022-10-25 ENCOUNTER — OFFICE VISIT (OUTPATIENT)
Dept: PSYCHIATRY | Facility: CLINIC | Age: 32
End: 2022-10-25
Payer: COMMERCIAL

## 2022-10-25 DIAGNOSIS — F43.10 PTSD (POST-TRAUMATIC STRESS DISORDER): ICD-10-CM

## 2022-10-25 DIAGNOSIS — F41.1 GAD (GENERALIZED ANXIETY DISORDER): ICD-10-CM

## 2022-10-25 DIAGNOSIS — F33.1 MAJOR DEPRESSIVE DISORDER, RECURRENT, MODERATE: Primary | ICD-10-CM

## 2022-10-25 PROCEDURE — 90834 PR PSYCHOTHERAPY W/PATIENT, 45 MIN: ICD-10-PCS | Mod: S$GLB,,, | Performed by: SOCIAL WORKER

## 2022-10-25 PROCEDURE — 90834 PSYTX W PT 45 MINUTES: CPT | Mod: S$GLB,,, | Performed by: SOCIAL WORKER

## 2022-10-25 NOTE — PROGRESS NOTES
"Individual Psychotherapy (PhD/LCSW)    10/25/2022    Site:  [facility]         Therapeutic Intervention: Met with patient.  Outpatient - Insight oriented psychotherapy 45 min - CPT code 65237, Outpatient - Behavior modifying psychotherapy 45 min - CPT code 78708 and Outpatient - Supportive psychotherapy 45 min - CPT Code 32849    Chief complaint/reason for encounter: PTSD     Interval history and content of current session: Pt is a 32 year-old single white female who presents this afternoon for a follow up therapy session. Pt and clinician listened to old voicemails that pt was able to access from prior to father's death. Father  in  of a blood infection exacerbated by bone cancer. Pt had originally agreed to write a letter to her father in clinician's office, however felt that this would be more cathartic in order to allow her the space to grieve. Clinician on board with this. Pt shared about 7 voicemails and did well with processing her feelings, memories, resentments. Clinician provided praise for pt's bravery in choosing to memorialize her father in this way as she has expressed fear of addressing grief due to anxiety about how she would manage the pain. Pt feeling like "this went better than expected." Pt looking to bring something each week to memorialize her father. She plans on looking for videos while they were in the hospital before he  or may bring a piece of his art. Clinician provided validation and thanked pt for sharing something so important and special to her.     Treatment plan:  Target symptoms: anxiety , adjustment, grief, work stress, PTSD  Why chosen therapy is appropriate versus another modality: relevant to diagnosis, patient responds to this modality, evidence based practice  Outcome monitoring methods: self-report, observation  Therapeutic intervention type: insight oriented psychotherapy, behavior modifying psychotherapy, supportive psychotherapy    Risk " parameters:  Patient reports no suicidal ideation  Patient reports no homicidal ideation  Patient reports no self-injurious behavior  Patient reports no violent behavior    Verbal deficits: None    Patient's response to intervention:  The patient's response to intervention is accepting.    Progress toward goals and other mental status changes:  The patient's progress toward goals is excellent.    Diagnosis:     ICD-10-CM ICD-9-CM   1. Major depressive disorder, recurrent, moderate  F33.1 296.32   2. SABI (generalized anxiety disorder)  F41.1 300.02   3. PTSD (post-traumatic stress disorder)  F43.10 309.81       Plan:  individual psychotherapy    Return to clinic: 2 weeks    Length of Service (minutes): 45

## 2022-11-01 ENCOUNTER — OFFICE VISIT (OUTPATIENT)
Dept: PSYCHIATRY | Facility: CLINIC | Age: 32
End: 2022-11-01
Payer: COMMERCIAL

## 2022-11-01 DIAGNOSIS — F12.20 CANNABIS DEPENDENCE, UNCOMPLICATED: ICD-10-CM

## 2022-11-01 DIAGNOSIS — F43.10 PTSD (POST-TRAUMATIC STRESS DISORDER): ICD-10-CM

## 2022-11-01 DIAGNOSIS — F33.1 MAJOR DEPRESSIVE DISORDER, RECURRENT, MODERATE: Primary | ICD-10-CM

## 2022-11-01 DIAGNOSIS — F41.1 GAD (GENERALIZED ANXIETY DISORDER): ICD-10-CM

## 2022-11-01 PROCEDURE — 90834 PR PSYCHOTHERAPY W/PATIENT, 45 MIN: ICD-10-PCS | Mod: S$GLB,,, | Performed by: SOCIAL WORKER

## 2022-11-01 PROCEDURE — 90834 PSYTX W PT 45 MINUTES: CPT | Mod: S$GLB,,, | Performed by: SOCIAL WORKER

## 2022-11-01 NOTE — PROGRESS NOTES
"Individual Psychotherapy (PhD/LCSW)    11/1/2022    Site:  [facility]         Therapeutic Intervention: Met with patient.  Outpatient - Insight oriented psychotherapy 45 min - CPT code 24531, Outpatient - Behavior modifying psychotherapy 45 min - CPT code 20335 and Outpatient - Supportive psychotherapy 45 min - CPT Code 20947    Chief complaint/reason for encounter: PTSD     Interval history and content of current session: Pt is a 32 year-old single white female who presents this afternoon for a follow up therapy session. Pt seems more balanced this week. She reports that boyfriend is looking to move in with a roommate and out of his mother's home. Pt excited about this. She reports that they dressed up together for Halloween and had a good time. 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 Job could start within a couple of months. 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 Pt states that her current job is a little less stressful now that an  is hired. She reports that the new hire is doing a great job. Pt states mother posted something on Facebook about feeling more depressed, however pt glad she is not "dumping" as much on the pt. They are both still wary of finances and hope to rent out their updated property soon as it is now on the market. Pt reports feeling less stressed about her dancing [employer] purchases. She states that she reached out to a vendor and has asked for help from other members of the group.     Treatment plan:  Target symptoms: anxiety , adjustment, grief, work stress, PTSD  Why chosen therapy is appropriate versus another modality: relevant to diagnosis, patient responds to this modality, evidence based practice  Outcome monitoring methods: self-report, observation  Therapeutic intervention type: insight oriented psychotherapy, behavior modifying psychotherapy, supportive " psychotherapy    Risk parameters:  Patient reports no suicidal ideation  Patient reports no homicidal ideation  Patient reports no self-injurious behavior  Patient reports no violent behavior    Verbal deficits: None    Patient's response to intervention:  The patient's response to intervention is accepting.    Progress toward goals and other mental status changes:  The patient's progress toward goals is excellent.    Diagnosis:     ICD-10-CM ICD-9-CM   1. Major depressive disorder, recurrent, moderate  F33.1 296.32   2. SABI (generalized anxiety disorder)  F41.1 300.02   3. PTSD (post-traumatic stress disorder)  F43.10 309.81   4. Cannabis dependence, uncomplicated  F12.20 304.30       Plan:  individual psychotherapy    Return to clinic: 2 weeks    Length of Service (minutes): 45

## 2022-11-02 ENCOUNTER — PATIENT MESSAGE (OUTPATIENT)
Dept: PSYCHIATRY | Facility: CLINIC | Age: 32
End: 2022-11-02
Payer: COMMERCIAL

## 2022-11-07 ENCOUNTER — PATIENT MESSAGE (OUTPATIENT)
Dept: PSYCHIATRY | Facility: CLINIC | Age: 32
End: 2022-11-07
Payer: COMMERCIAL

## 2022-11-15 ENCOUNTER — OFFICE VISIT (OUTPATIENT)
Dept: PSYCHIATRY | Facility: CLINIC | Age: 32
End: 2022-11-15
Payer: COMMERCIAL

## 2022-11-15 DIAGNOSIS — F33.1 MAJOR DEPRESSIVE DISORDER, RECURRENT, MODERATE: Primary | ICD-10-CM

## 2022-11-15 DIAGNOSIS — F41.1 GAD (GENERALIZED ANXIETY DISORDER): ICD-10-CM

## 2022-11-15 DIAGNOSIS — F43.10 PTSD (POST-TRAUMATIC STRESS DISORDER): ICD-10-CM

## 2022-11-15 PROCEDURE — 90834 PR PSYCHOTHERAPY W/PATIENT, 45 MIN: ICD-10-PCS | Mod: S$GLB,,, | Performed by: SOCIAL WORKER

## 2022-11-15 PROCEDURE — 90834 PSYTX W PT 45 MINUTES: CPT | Mod: S$GLB,,, | Performed by: SOCIAL WORKER

## 2022-11-16 NOTE — PROGRESS NOTES
"Individual Psychotherapy (PhD/LCSW)    11/15/2022    Site:  [facility]         Therapeutic Intervention: Met with patient.  Outpatient - Insight oriented psychotherapy 45 min - CPT code 09717, Outpatient - Behavior modifying psychotherapy 45 min - CPT code 86618 and Outpatient - Supportive psychotherapy 45 min - CPT Code 09679    Chief complaint/reason for encounter: PTSD     Interval history and content of current session: Pt is a 32 year-old single white female who presents this afternoon for a follow up therapy session. Pt continues to appear brighter in affect, less depressed. Pt reports that she would like to spend Thanksgiving with boyfriend and his family, however pt described boyfriend's response as wishy washy. Pt states that he and his mother are going to see the new [employer] movie. Pt asked if she could come and he declined stating that this was something he and mother wanted to do together. Pt expressed feeling hurt but also understanding. Pt reports that she plans on getting back into gym routine. Has been less worried about mother although states she still "trauma dumps." Pt reports that she is still actively looking for and applying for jobs. Feels that work is less stressful now that a new  is settled into role.    Treatment plan:  Target symptoms: anxiety , adjustment, grief, work stress, PTSD  Why chosen therapy is appropriate versus another modality: relevant to diagnosis, patient responds to this modality, evidence based practice  Outcome monitoring methods: self-report, observation  Therapeutic intervention type: insight oriented psychotherapy, behavior modifying psychotherapy, supportive psychotherapy    Risk parameters:  Patient reports no suicidal ideation  Patient reports no homicidal ideation  Patient reports no self-injurious behavior  Patient reports no violent behavior    Verbal deficits: None    Patient's response to intervention:  The patient's response to " intervention is accepting.    Progress toward goals and other mental status changes:  The patient's progress toward goals is excellent.    Diagnosis:     ICD-10-CM ICD-9-CM   1. Major depressive disorder, recurrent, moderate  F33.1 296.32   2. SABI (generalized anxiety disorder)  F41.1 300.02   3. PTSD (post-traumatic stress disorder)  F43.10 309.81       Plan:  individual psychotherapy    Return to clinic: 2 weeks    Length of Service (minutes): 45

## 2022-11-22 ENCOUNTER — OFFICE VISIT (OUTPATIENT)
Dept: PSYCHIATRY | Facility: CLINIC | Age: 32
End: 2022-11-22
Payer: COMMERCIAL

## 2022-11-22 DIAGNOSIS — F43.10 PTSD (POST-TRAUMATIC STRESS DISORDER): ICD-10-CM

## 2022-11-22 DIAGNOSIS — F41.1 GAD (GENERALIZED ANXIETY DISORDER): ICD-10-CM

## 2022-11-22 DIAGNOSIS — F33.1 MAJOR DEPRESSIVE DISORDER, RECURRENT, MODERATE: Primary | ICD-10-CM

## 2022-11-22 PROCEDURE — 90834 PR PSYCHOTHERAPY W/PATIENT, 45 MIN: ICD-10-PCS | Mod: S$GLB,,, | Performed by: SOCIAL WORKER

## 2022-11-22 PROCEDURE — 90834 PSYTX W PT 45 MINUTES: CPT | Mod: S$GLB,,, | Performed by: SOCIAL WORKER

## 2022-11-22 NOTE — PROGRESS NOTES
Individual Psychotherapy (PhD/LCSW)    11/22/2022    Site:  [facility]         Therapeutic Intervention: Met with patient.  Outpatient - Insight oriented psychotherapy 45 min - CPT code 24634, Outpatient - Behavior modifying psychotherapy 45 min - CPT code 07625 and Outpatient - Supportive psychotherapy 45 min - CPT Code 80167    Chief complaint/reason for encounter: PTSD     Interval history and content of current session: Pt is a 32 year-old single white female who presents this afternoon for a follow up therapy session. Pt reports that she found videos and photos of week she spent with father in the hospital and at his home once he transitioned to hospice. Father passed on Feb 3rd, 2015. Pt reports that it was very difficult looking through the photos and videos and has more perspective on why she blocked these memories out. Pt spent most of the session processing sadness around father's transition from life to death. She states that she wonders if he knew he was dying and plans on eventually talking with her younger sister and mother about this as she reports that they may have a better sense. Pt reports that she plans on going through graduation pictures to remember father at this time (May 2014) as this was the last memory of him looking well. Pt to bring photos to next session. Pt reports that she and boyfriend are going to her friends' Thanksgiving Day celebration instead of to his family's home on the [address] [employer] [address] [employer]. Pt states that boyfriend did not realize that his relatives would be in [place] until pt asked boyfriend's mother about their plans.    Treatment plan:  Target symptoms: anxiety , adjustment, grief, work stress, PTSD  Why chosen therapy is appropriate versus another modality: relevant to diagnosis, patient responds to this modality, evidence based practice  Outcome monitoring methods: self-report, observation  Therapeutic intervention type: insight oriented psychotherapy, behavior modifying  psychotherapy, supportive psychotherapy    Risk parameters:  Patient reports no suicidal ideation  Patient reports no homicidal ideation  Patient reports no self-injurious behavior  Patient reports no violent behavior    Verbal deficits: None    Patient's response to intervention:  The patient's response to intervention is accepting.    Progress toward goals and other mental status changes:  The patient's progress toward goals is excellent.    Diagnosis:     ICD-10-CM ICD-9-CM   1. Major depressive disorder, recurrent, moderate  F33.1 296.32   2. SABI (generalized anxiety disorder)  F41.1 300.02   3. PTSD (post-traumatic stress disorder)  F43.10 309.81       Plan:  individual psychotherapy    Return to clinic: 2 weeks    Length of Service (minutes): 45

## 2022-11-29 ENCOUNTER — OFFICE VISIT (OUTPATIENT)
Dept: PSYCHIATRY | Facility: CLINIC | Age: 32
End: 2022-11-29
Payer: COMMERCIAL

## 2022-11-29 DIAGNOSIS — F43.10 PTSD (POST-TRAUMATIC STRESS DISORDER): ICD-10-CM

## 2022-11-29 DIAGNOSIS — F33.1 MAJOR DEPRESSIVE DISORDER, RECURRENT, MODERATE: Primary | ICD-10-CM

## 2022-11-29 DIAGNOSIS — F41.1 GAD (GENERALIZED ANXIETY DISORDER): ICD-10-CM

## 2022-11-29 PROCEDURE — 90834 PR PSYCHOTHERAPY W/PATIENT, 45 MIN: ICD-10-PCS | Mod: S$GLB,,, | Performed by: SOCIAL WORKER

## 2022-11-29 PROCEDURE — 90834 PSYTX W PT 45 MINUTES: CPT | Mod: S$GLB,,, | Performed by: SOCIAL WORKER
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
## 2022-12-08 ENCOUNTER — OFFICE VISIT (OUTPATIENT)
Dept: PSYCHIATRY | Facility: CLINIC | Age: 32
End: 2022-12-08
Payer: COMMERCIAL

## 2022-12-08 DIAGNOSIS — F41.1 GAD (GENERALIZED ANXIETY DISORDER): ICD-10-CM

## 2022-12-08 DIAGNOSIS — F33.1 MAJOR DEPRESSIVE DISORDER, RECURRENT, MODERATE: Primary | ICD-10-CM

## 2022-12-08 DIAGNOSIS — F43.10 PTSD (POST-TRAUMATIC STRESS DISORDER): ICD-10-CM

## 2022-12-08 PROCEDURE — 90834 PR PSYCHOTHERAPY W/PATIENT, 45 MIN: ICD-10-PCS | Mod: S$GLB,,, | Performed by: SOCIAL WORKER

## 2022-12-08 PROCEDURE — 90834 PSYTX W PT 45 MINUTES: CPT | Mod: S$GLB,,, | Performed by: SOCIAL WORKER

## 2022-12-08 NOTE — PROGRESS NOTES
Individual Psychotherapy (PhD/LCSW)    12/8/2022    Site:  [facility]         Therapeutic Intervention: Met with patient.  Outpatient - Insight oriented psychotherapy 45 min - CPT code 95230, Outpatient - Behavior modifying psychotherapy 45 min - CPT code 89171 and Outpatient - Supportive psychotherapy 45 min - CPT Code 19641    Chief complaint/reason for encounter: PTSD     Interval history and content of current session: Pt is a 32 year-old single white female who presents this afternoon for a follow up therapy session. Pt reports having a fairly good week since last session. Pt states that she received a lot of support from boyfriend. Feels that their conversations are cathartic. Pt admits to having few outlets where she can safely express her emotions other than therapy. Pt reports that she is managing work better. Less stress but recognizes that she does not do well when yelled at by parents or adults. Pt reports that mother is in a downward cycle due to issues with their shared properties. Pt describes walking on egg shells with her to avoid being screamed at. Pt trying to shift the script between her and younger sister. Instead of comparing each other, pt working on seeing herself and sister as two very different people. Pt flying to [place] and then [place] with sister to visit their grandparents. Pt hoping to avoid any major confrontations so that she can enjoy their time together. Pt has not worked on the grief tree yet but plans on completing it while away from therapy in December.     Treatment plan:  Target symptoms: anxiety , adjustment, grief, work stress, PTSD  Why chosen therapy is appropriate versus another modality: relevant to diagnosis, patient responds to this modality, evidence based practice  Outcome monitoring methods: self-report, observation  Therapeutic intervention type: insight oriented psychotherapy, behavior modifying psychotherapy, supportive psychotherapy    Risk  parameters:  Patient reports no suicidal ideation  Patient reports no homicidal ideation  Patient reports no self-injurious behavior  Patient reports no violent behavior    Verbal deficits: None    Patient's response to intervention:  The patient's response to intervention is accepting.    Progress toward goals and other mental status changes:  The patient's progress toward goals is excellent.    Diagnosis:     ICD-10-CM ICD-9-CM   1. Major depressive disorder, recurrent, moderate  F33.1 296.32   2. SABI (generalized anxiety disorder)  F41.1 300.02   3. PTSD (post-traumatic stress disorder)  F43.10 309.81       Plan:  individual psychotherapy    Return to clinic: 2 weeks    Length of Service (minutes): 45

## 2023-01-23 ENCOUNTER — OFFICE VISIT (OUTPATIENT)
Dept: PSYCHIATRY | Facility: CLINIC | Age: 33
End: 2023-01-23
Payer: COMMERCIAL

## 2023-01-23 DIAGNOSIS — F33.1 MAJOR DEPRESSIVE DISORDER, RECURRENT, MODERATE: Primary | ICD-10-CM

## 2023-01-23 DIAGNOSIS — F41.1 GAD (GENERALIZED ANXIETY DISORDER): ICD-10-CM

## 2023-01-23 DIAGNOSIS — F43.10 PTSD (POST-TRAUMATIC STRESS DISORDER): ICD-10-CM

## 2023-01-23 PROCEDURE — 90837 PSYTX W PT 60 MINUTES: CPT | Mod: S$GLB,,, | Performed by: SOCIAL WORKER

## 2023-01-23 PROCEDURE — 90837 PR PSYCHOTHERAPY W/PATIENT, 60 MIN: ICD-10-PCS | Mod: S$GLB,,, | Performed by: SOCIAL WORKER
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
## 2023-03-13 ENCOUNTER — OFFICE VISIT (OUTPATIENT)
Dept: PSYCHIATRY | Facility: CLINIC | Age: 33
End: 2023-03-13
Payer: COMMERCIAL

## 2023-03-13 DIAGNOSIS — F33.1 MAJOR DEPRESSIVE DISORDER, RECURRENT, MODERATE: Primary | ICD-10-CM

## 2023-03-13 DIAGNOSIS — F43.10 PTSD (POST-TRAUMATIC STRESS DISORDER): ICD-10-CM

## 2023-03-13 DIAGNOSIS — F41.1 GAD (GENERALIZED ANXIETY DISORDER): ICD-10-CM

## 2023-03-13 PROCEDURE — 90837 PSYTX W PT 60 MINUTES: CPT | Mod: S$GLB,,, | Performed by: SOCIAL WORKER

## 2023-03-13 PROCEDURE — 90837 PR PSYCHOTHERAPY W/PATIENT, 60 MIN: ICD-10-PCS | Mod: S$GLB,,, | Performed by: SOCIAL WORKER

## 2023-03-13 NOTE — PROGRESS NOTES
Individual Psychotherapy (PhD/LCSW)    3/13/2023    Site:  [facility]         Therapeutic Intervention: Met with patient.  Outpatient - Insight oriented psychotherapy 60 min - CPT code 48681, Outpatient - Behavior modifying psychotherapy 60 min - CPT code 35610 and Outpatient - Supportive psychotherapy 60 min - CPT Code 60608    Chief complaint/reason for encounter: PTSD     Interval history and content of current session: Pt is a 32 year-old single white female who presents this afternoon for a follow up therapy session. Pt reports that [facility] went well. Expressing ongoing frustration with mother who pt describes as manipulative. Took all of pt's flight points from [facility] and used on herself. Pt also gave her mother some of her money that she got from cancelled [facility] trip to pay for additional housing/mortgage expenses. Pt reports that she and Christien are having some intimacy issues. He is having trouble with ED while pt is also having trouble initiating. Pt and clinician discussed strategies for talking to him and for initiating/helping increase arousal. Pt states that she enjoys their time together, feels he was really supportive during [facility], would like to see him take more initiation health and wellness. Pt states he has been saying he'd start therapy when they first met, and now it has been almost a year and he has not initiated any therapy. 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 Pt requested a raise formally via written letter. Hoping to potentially stay a little longer if she gets the raise she advocated for. Pt and clinician to start CPT therapy next session as pt is now regularly attending sessions again.     Treatment plan:  Target symptoms: anxiety , adjustment, grief, work stress, PTSD  Why chosen therapy is appropriate versus another modality: relevant to  diagnosis, patient responds to this modality, evidence based practice  Outcome monitoring methods: self-report, observation  Therapeutic intervention type: insight oriented psychotherapy, behavior modifying psychotherapy, supportive psychotherapy    Risk parameters:  Patient reports no suicidal ideation  Patient reports no homicidal ideation  Patient reports no self-injurious behavior  Patient reports no violent behavior    Verbal deficits: None    Patient's response to intervention:  The patient's response to intervention is accepting.    Progress toward goals and other mental status changes:  The patient's progress toward goals is excellent.    Diagnosis:     ICD-10-CM ICD-9-CM   1. Major depressive disorder, recurrent, moderate  F33.1 296.32   2. SABI (generalized anxiety disorder)  F41.1 300.02   3. PTSD (post-traumatic stress disorder)  F43.10 309.81       Plan:  individual psychotherapy    Return to clinic: 1 week    Length of Service (minutes): 60

## 2023-03-16 ENCOUNTER — PATIENT MESSAGE (OUTPATIENT)
Dept: PSYCHIATRY | Facility: CLINIC | Age: 33
End: 2023-03-16
Payer: COMMERCIAL

## 2023-03-20 ENCOUNTER — OFFICE VISIT (OUTPATIENT)
Dept: PSYCHIATRY | Facility: CLINIC | Age: 33
End: 2023-03-20
Payer: COMMERCIAL

## 2023-03-20 DIAGNOSIS — F43.10 PTSD (POST-TRAUMATIC STRESS DISORDER): ICD-10-CM

## 2023-03-20 DIAGNOSIS — F33.1 MAJOR DEPRESSIVE DISORDER, RECURRENT, MODERATE: Primary | ICD-10-CM

## 2023-03-20 DIAGNOSIS — F41.1 GAD (GENERALIZED ANXIETY DISORDER): ICD-10-CM

## 2023-03-20 PROCEDURE — 90837 PSYTX W PT 60 MINUTES: CPT | Mod: S$GLB,,, | Performed by: SOCIAL WORKER

## 2023-03-20 PROCEDURE — 90837 PR PSYCHOTHERAPY W/PATIENT, 60 MIN: ICD-10-PCS | Mod: S$GLB,,, | Performed by: SOCIAL WORKER

## 2023-03-20 NOTE — PROGRESS NOTES
Individual Psychotherapy (PhD/LCSW)    3/20/2023    Site:  [facility]         Therapeutic Intervention: Met with patient.  Outpatient - Insight oriented psychotherapy 60 min - CPT code 03048, Outpatient - Behavior modifying psychotherapy 60 min - CPT code 89940 and Outpatient - Supportive psychotherapy 60 min - CPT Code 41750    Chief complaint/reason for encounter: PTSD     Interval history and content of current session: Pt is a 32 year-old single white female who presents this afternoon for a follow up therapy session.       Cognitive Processing Therapy (CPT), Session #1  Overview of PTSD and CPT    Session Focus:  Introduced CPT (PTSD, stuck points, emotions)  Introduced Stuck Point log     Practice Assignment:  1) Stuck point log - Add to the stuck point log as needed  2) Impact statement - Write a short, one-page essay describing the reasons the trauma happened, and the consequences of trauma in terms of beliefs about yourself, others, and the world.  Also write about how the trauma impacted each of the CPT themes (Safety, Trust, Power/Control, Esteem, Intimacy) for yourself and others.      Treatment plan:  Target symptoms: anxiety , adjustment, grief, work stress, PTSD  Why chosen therapy is appropriate versus another modality: relevant to diagnosis, patient responds to this modality, evidence based practice  Outcome monitoring methods: self-report, observation  Therapeutic intervention type: insight oriented psychotherapy, behavior modifying psychotherapy, supportive psychotherapy    Risk parameters:  Patient reports no suicidal ideation  Patient reports no homicidal ideation  Patient reports no self-injurious behavior  Patient reports no violent behavior    Verbal deficits: None    Patient's response to intervention:  The patient's response to intervention is accepting.    Progress toward goals and other mental status changes:  The patient's progress toward goals is excellent.    Diagnosis:      ICD-10-CM ICD-9-CM   1. Major depressive disorder, recurrent, moderate  F33.1 296.32   2. SABI (generalized anxiety disorder)  F41.1 300.02   3. PTSD (post-traumatic stress disorder)  F43.10 309.81       Plan:  individual psychotherapy    Return to clinic: 1 week    Length of Service (minutes): 60

## 2023-03-25 ENCOUNTER — PATIENT MESSAGE (OUTPATIENT)
Dept: PSYCHIATRY | Facility: CLINIC | Age: 33
End: 2023-03-25
Payer: COMMERCIAL

## 2023-03-28 ENCOUNTER — OFFICE VISIT (OUTPATIENT)
Dept: PSYCHIATRY | Facility: CLINIC | Age: 33
End: 2023-03-28
Payer: COMMERCIAL

## 2023-03-28 DIAGNOSIS — F41.1 GAD (GENERALIZED ANXIETY DISORDER): ICD-10-CM

## 2023-03-28 DIAGNOSIS — F43.10 PTSD (POST-TRAUMATIC STRESS DISORDER): ICD-10-CM

## 2023-03-28 DIAGNOSIS — F33.1 MAJOR DEPRESSIVE DISORDER, RECURRENT, MODERATE: Primary | ICD-10-CM

## 2023-03-28 PROCEDURE — 90837 PSYTX W PT 60 MINUTES: CPT | Mod: S$GLB,,, | Performed by: SOCIAL WORKER

## 2023-03-28 PROCEDURE — 90837 PR PSYCHOTHERAPY W/PATIENT, 60 MIN: ICD-10-PCS | Mod: S$GLB,,, | Performed by: SOCIAL WORKER

## 2023-03-28 NOTE — PROGRESS NOTES
Individual Psychotherapy (PhD/LCSW)    3/28/2023    Site:  [facility]         Therapeutic Intervention: Met with patient.  Outpatient - Insight oriented psychotherapy 60 min - CPT code 12728, Outpatient - Behavior modifying psychotherapy 60 min - CPT code 74388 and Outpatient - Supportive psychotherapy 60 min - CPT Code 73488    Chief complaint/reason for encounter: PTSD     Interval history and content of current session: Pt is a 32 year-old single white female who presents this afternoon for a follow up therapy session.       Cognitive Processing Therapy (CPT), Session #2  Examining the Impact of Trauma  PCL-5:  22  Impact Statement completed:  5    Session Focus:  Impact Statement review  Stuck Point log  Examined connections among events, thoughts, and feelings  Introduced ABC worksheets     Practice Assignment:  1) Stuck point log - Add to the stuck point log as needed  2) ABC worksheets - Complete daily self-monitoring on the relationships among events, thoughts, and feelings.  Complete at least one worksheet related to trauma.      Treatment plan:  Target symptoms: anxiety , adjustment, grief, work stress, PTSD  Why chosen therapy is appropriate versus another modality: relevant to diagnosis, patient responds to this modality, evidence based practice  Outcome monitoring methods: self-report, observation  Therapeutic intervention type: insight oriented psychotherapy, behavior modifying psychotherapy, supportive psychotherapy    Risk parameters:  Patient reports no suicidal ideation  Patient reports no homicidal ideation  Patient reports no self-injurious behavior  Patient reports no violent behavior    Verbal deficits: None    Patient's response to intervention:  The patient's response to intervention is accepting.    Progress toward goals and other mental status changes:  The patient's progress toward goals is excellent.    Diagnosis:     ICD-10-CM ICD-9-CM   1. Major depressive disorder, recurrent,  moderate  F33.1 296.32   2. SABI (generalized anxiety disorder)  F41.1 300.02   3. PTSD (post-traumatic stress disorder)  F43.10 309.81       Plan:  individual psychotherapy    Return to clinic: 1 week    Length of Service (minutes): 60

## 2023-04-04 ENCOUNTER — PATIENT MESSAGE (OUTPATIENT)
Dept: PSYCHIATRY | Facility: CLINIC | Age: 33
End: 2023-04-04
Payer: COMMERCIAL

## 2023-04-04 ENCOUNTER — OFFICE VISIT (OUTPATIENT)
Dept: PSYCHIATRY | Facility: CLINIC | Age: 33
End: 2023-04-04
Payer: COMMERCIAL

## 2023-04-04 VITALS
BODY MASS INDEX: 22.48 KG/M2 | DIASTOLIC BLOOD PRESSURE: 80 MMHG | SYSTOLIC BLOOD PRESSURE: 128 MMHG | WEIGHT: 147.81 LBS | HEART RATE: 93 BPM

## 2023-04-04 DIAGNOSIS — F33.1 MAJOR DEPRESSIVE DISORDER, RECURRENT, MODERATE: ICD-10-CM

## 2023-04-04 DIAGNOSIS — F41.1 GAD (GENERALIZED ANXIETY DISORDER): ICD-10-CM

## 2023-04-04 DIAGNOSIS — F43.10 PTSD (POST-TRAUMATIC STRESS DISORDER): Primary | ICD-10-CM

## 2023-04-04 PROCEDURE — 99999 PR PBB SHADOW E&M-EST. PATIENT-LVL III: CPT | Mod: PBBFAC,,, | Performed by: NURSE PRACTITIONER

## 2023-04-04 PROCEDURE — 1160F PR REVIEW ALL MEDS BY PRESCRIBER/CLIN PHARMACIST DOCUMENTED: ICD-10-PCS | Mod: CPTII,S$GLB,, | Performed by: NURSE PRACTITIONER

## 2023-04-04 PROCEDURE — 1159F MED LIST DOCD IN RCRD: CPT | Mod: CPTII,S$GLB,, | Performed by: NURSE PRACTITIONER

## 2023-04-04 PROCEDURE — 3079F DIAST BP 80-89 MM HG: CPT | Mod: CPTII,S$GLB,, | Performed by: NURSE PRACTITIONER

## 2023-04-04 PROCEDURE — 99213 OFFICE O/P EST LOW 20 MIN: CPT | Mod: S$GLB,,, | Performed by: NURSE PRACTITIONER

## 2023-04-04 PROCEDURE — 1160F RVW MEDS BY RX/DR IN RCRD: CPT | Mod: CPTII,S$GLB,, | Performed by: NURSE PRACTITIONER

## 2023-04-04 PROCEDURE — 3074F PR MOST RECENT SYSTOLIC BLOOD PRESSURE < 130 MM HG: ICD-10-PCS | Mod: CPTII,S$GLB,, | Performed by: NURSE PRACTITIONER

## 2023-04-04 PROCEDURE — 3074F SYST BP LT 130 MM HG: CPT | Mod: CPTII,S$GLB,, | Performed by: NURSE PRACTITIONER

## 2023-04-04 PROCEDURE — 99213 PR OFFICE/OUTPT VISIT, EST, LEVL III, 20-29 MIN: ICD-10-PCS | Mod: S$GLB,,, | Performed by: NURSE PRACTITIONER

## 2023-04-04 PROCEDURE — 99999 PR PBB SHADOW E&M-EST. PATIENT-LVL III: ICD-10-PCS | Mod: PBBFAC,,, | Performed by: NURSE PRACTITIONER

## 2023-04-04 PROCEDURE — 3079F PR MOST RECENT DIASTOLIC BLOOD PRESSURE 80-89 MM HG: ICD-10-PCS | Mod: CPTII,S$GLB,, | Performed by: NURSE PRACTITIONER

## 2023-04-04 PROCEDURE — 3008F PR BODY MASS INDEX (BMI) DOCUMENTED: ICD-10-PCS | Mod: CPTII,S$GLB,, | Performed by: NURSE PRACTITIONER

## 2023-04-04 PROCEDURE — 3008F BODY MASS INDEX DOCD: CPT | Mod: CPTII,S$GLB,, | Performed by: NURSE PRACTITIONER

## 2023-04-04 PROCEDURE — 1159F PR MEDICATION LIST DOCUMENTED IN MEDICAL RECORD: ICD-10-PCS | Mod: CPTII,S$GLB,, | Performed by: NURSE PRACTITIONER

## 2023-04-04 RX ORDER — CLONAZEPAM 0.5 MG/1
0.5 TABLET ORAL 2 TIMES DAILY PRN
Qty: 60 TABLET | Refills: 3 | Status: SHIPPED | OUTPATIENT
Start: 2023-04-04 | End: 2023-11-27 | Stop reason: SDUPTHER
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
## 2023-04-11 ENCOUNTER — OFFICE VISIT (OUTPATIENT)
Dept: PSYCHIATRY | Facility: CLINIC | Age: 33
End: 2023-04-11
Payer: COMMERCIAL

## 2023-04-11 DIAGNOSIS — F41.1 GAD (GENERALIZED ANXIETY DISORDER): ICD-10-CM

## 2023-04-11 DIAGNOSIS — F33.1 MAJOR DEPRESSIVE DISORDER, RECURRENT, MODERATE: Primary | ICD-10-CM

## 2023-04-11 DIAGNOSIS — F43.10 PTSD (POST-TRAUMATIC STRESS DISORDER): ICD-10-CM

## 2023-04-11 PROCEDURE — 90837 PSYTX W PT 60 MINUTES: CPT | Mod: 95,,, | Performed by: SOCIAL WORKER

## 2023-04-11 PROCEDURE — 90837 PR PSYCHOTHERAPY W/PATIENT, 60 MIN: ICD-10-PCS | Mod: 95,,, | Performed by: SOCIAL WORKER

## 2023-04-11 NOTE — PROGRESS NOTES
Individual Psychotherapy (PhD/LCSW)    4/11/2023    The patient location is: [place], [place]  The chief complaint leading to consultation is: depression, anxiety, PTSD    Visit type: audiovisual    Face to Face time with patient: 50 min  60 minutes of total time spent on the encounter, which includes face to face time and non-face to face time preparing to see the patient (eg, review of tests), Obtaining and/or reviewing separately obtained history, Documenting clinical information in the electronic or other health record, Independently interpreting results (not separately reported) and communicating results to the patient/family/caregiver, or Care coordination (not separately reported).         Each patient to whom he or she provides medical services by telemedicine is:  (1) informed of the relationship between the physician and patient and the respective role of any other health care provider with respect to management of the patient; and (2) notified that he or she may decline to receive medical services by telemedicine and may withdraw from such care at any time.    Notes:       Site:  [facility]         Therapeutic Intervention: Met with patient.  Outpatient - Insight oriented psychotherapy 60 min - CPT code 37632, Outpatient - Behavior modifying psychotherapy 60 min - CPT code 13307 and Outpatient - Supportive psychotherapy 60 min - CPT Code 37080    Chief complaint/reason for encounter: PTSD     Interval history and content of current session: Pt is a 32 year-old single white female who presents this afternoon for a follow up therapy session.       Cognitive Processing Therapy (CPT), Session #3  Working with Events, Thoughts, and Feelings  PCL-5:  18  PHQ-9: 6    Worksheets completed:  yes (1 week's worth)    Session Focus:  Stuck Point log  ABC worksheets review     Practice Assignment:  1) Stuck point log - Add to the stuck point log as needed  2) ABC worksheets - Complete daily self-monitoring on  the relationships among events, thoughts, and feelings.  Complete one worksheet every day related to trauma.      Treatment plan:  Target symptoms: anxiety , adjustment, grief, work stress, PTSD  Why chosen therapy is appropriate versus another modality: relevant to diagnosis, patient responds to this modality, evidence based practice  Outcome monitoring methods: self-report, observation  Therapeutic intervention type: insight oriented psychotherapy, behavior modifying psychotherapy, supportive psychotherapy    Risk parameters:  Patient reports no suicidal ideation  Patient reports no homicidal ideation  Patient reports no self-injurious behavior  Patient reports no violent behavior    Verbal deficits: None    Patient's response to intervention:  The patient's response to intervention is accepting.    Progress toward goals and other mental status changes:  The patient's progress toward goals is excellent.    Diagnosis:     ICD-10-CM ICD-9-CM   1. Major depressive disorder, recurrent, moderate  F33.1 296.32   2. SABI (generalized anxiety disorder)  F41.1 300.02   3. PTSD (post-traumatic stress disorder)  F43.10 309.81       Plan:  individual psychotherapy    Return to clinic: 1 week    Length of Service (minutes): 60

## 2023-04-17 ENCOUNTER — OFFICE VISIT (OUTPATIENT)
Dept: PSYCHIATRY | Facility: CLINIC | Age: 33
End: 2023-04-17
Payer: COMMERCIAL

## 2023-04-17 DIAGNOSIS — F43.10 PTSD (POST-TRAUMATIC STRESS DISORDER): ICD-10-CM

## 2023-04-17 DIAGNOSIS — F33.1 MAJOR DEPRESSIVE DISORDER, RECURRENT, MODERATE: Primary | ICD-10-CM

## 2023-04-17 DIAGNOSIS — F41.1 GAD (GENERALIZED ANXIETY DISORDER): ICD-10-CM

## 2023-04-17 PROCEDURE — 90837 PR PSYCHOTHERAPY W/PATIENT, 60 MIN: ICD-10-PCS | Mod: S$GLB,,, | Performed by: SOCIAL WORKER

## 2023-04-17 PROCEDURE — 90837 PSYTX W PT 60 MINUTES: CPT | Mod: S$GLB,,, | Performed by: SOCIAL WORKER

## 2023-04-17 NOTE — PROGRESS NOTES
Individual Psychotherapy (PhD/LCSW)    4/17/2023    The patient location is: [place], [place]  The chief complaint leading to consultation is: depression, anxiety, PTSD    Visit type: audiovisual    Face to Face time with patient: 50 min  60 minutes of total time spent on the encounter, which includes face to face time and non-face to face time preparing to see the patient (eg, review of tests), Obtaining and/or reviewing separately obtained history, Documenting clinical information in the electronic or other health record, Independently interpreting results (not separately reported) and communicating results to the patient/family/caregiver, or Care coordination (not separately reported).         Each patient to whom he or she provides medical services by telemedicine is:  (1) informed of the relationship between the physician and patient and the respective role of any other health care provider with respect to management of the patient; and (2) notified that he or she may decline to receive medical services by telemedicine and may withdraw from such care at any time.    Notes:       Site:  [facility]         Therapeutic Intervention: Met with patient.  Outpatient - Insight oriented psychotherapy 60 min - CPT code 77868, Outpatient - Behavior modifying psychotherapy 60 min - CPT code 09031 and Outpatient - Supportive psychotherapy 60 min - CPT Code 48834    Chief complaint/reason for encounter: PTSD     Interval history and content of current session: Pt is a 32 year-old single white female who presents this afternoon for a follow up therapy session.       Cognitive Processing Therapy (CPT), Session #4  Examining the Index Event  PCL-5:  10  PHQ-9: 6    Worksheets completed:  Pt did one. Completed an additional ABC sheet in session.     Session Focus:  Stuck Point log  ABC worksheets review  Discussed levels of responsibility  Introduced Challenging Questions worksheet     Practice Assignment:  1) Stuck point  log - Add to the stuck point log as needed  2) Challenging Questions worksheets - Complete at least one worksheet related to trauma.          Treatment plan:  Target symptoms: anxiety , adjustment, grief, work stress, PTSD  Why chosen therapy is appropriate versus another modality: relevant to diagnosis, patient responds to this modality, evidence based practice  Outcome monitoring methods: self-report, observation  Therapeutic intervention type: insight oriented psychotherapy, behavior modifying psychotherapy, supportive psychotherapy    Risk parameters:  Patient reports no suicidal ideation  Patient reports no homicidal ideation  Patient reports no self-injurious behavior  Patient reports no violent behavior    Verbal deficits: None    Patient's response to intervention:  The patient's response to intervention is accepting.    Progress toward goals and other mental status changes:  The patient's progress toward goals is excellent.    Diagnosis:     ICD-10-CM ICD-9-CM   1. Major depressive disorder, recurrent, moderate  F33.1 296.32   2. SABI (generalized anxiety disorder)  F41.1 300.02   3. PTSD (post-traumatic stress disorder)  F43.10 309.81       Plan:  individual psychotherapy    Return to clinic: 1 week    Length of Service (minutes): 60

## 2023-04-25 ENCOUNTER — OFFICE VISIT (OUTPATIENT)
Dept: PSYCHIATRY | Facility: CLINIC | Age: 33
End: 2023-04-25
Payer: COMMERCIAL

## 2023-04-25 DIAGNOSIS — F33.1 MAJOR DEPRESSIVE DISORDER, RECURRENT, MODERATE: Primary | ICD-10-CM

## 2023-04-25 DIAGNOSIS — F41.1 GAD (GENERALIZED ANXIETY DISORDER): ICD-10-CM

## 2023-04-25 DIAGNOSIS — F12.20 CANNABIS DEPENDENCE, UNCOMPLICATED: ICD-10-CM

## 2023-04-25 DIAGNOSIS — F43.10 PTSD (POST-TRAUMATIC STRESS DISORDER): ICD-10-CM

## 2023-04-25 PROCEDURE — 90837 PSYTX W PT 60 MINUTES: CPT | Mod: S$GLB,,, | Performed by: SOCIAL WORKER

## 2023-04-25 PROCEDURE — 90837 PR PSYCHOTHERAPY W/PATIENT, 60 MIN: ICD-10-PCS | Mod: S$GLB,,, | Performed by: SOCIAL WORKER

## 2023-04-25 NOTE — PROGRESS NOTES
Individual Psychotherapy (PhD/LCSW)    4/25/2023      Site:  [facility]         Therapeutic Intervention: Met with patient.  Outpatient - Insight oriented psychotherapy 60 min - CPT code 37173, Outpatient - Behavior modifying psychotherapy 60 min - CPT code 30026 and Outpatient - Supportive psychotherapy 60 min - CPT Code 92561    Chief complaint/reason for encounter: PTSD     Interval history and content of current session: Pt is a 32 year-old single white female who presents this afternoon for a follow up therapy session.       Cognitive Processing Therapy (CPT), Session #5  Using the Challenging Questions Worksheet  PCL-5:  18  PHQ-9: 8    Worksheets completed:  yes    Session Focus:  Stuck Point log  Challenging Questions worksheets review  Introduced Patterns of Problematic Thinking worksheet     Practice Assignment:  1) Stuck point log - Add to the stuck point log as needed  2) Patterns of Problematic Thinking worksheets - Complete at least one related to a Stuck Point from the Stuck Point Log.  Complete one worksheet every day.      Treatment plan:  Target symptoms: anxiety , adjustment, grief, work stress, PTSD  Why chosen therapy is appropriate versus another modality: relevant to diagnosis, patient responds to this modality, evidence based practice  Outcome monitoring methods: self-report, observation  Therapeutic intervention type: insight oriented psychotherapy, behavior modifying psychotherapy, supportive psychotherapy    Risk parameters:  Patient reports no suicidal ideation  Patient reports no homicidal ideation  Patient reports no self-injurious behavior  Patient reports no violent behavior    Verbal deficits: None    Patient's response to intervention:  The patient's response to intervention is accepting.    Progress toward goals and other mental status changes:  The patient's progress toward goals is excellent.    Diagnosis:     ICD-10-CM ICD-9-CM   1. Major depressive disorder, recurrent,  moderate  F33.1 296.32   2. SABI (generalized anxiety disorder)  F41.1 300.02   3. PTSD (post-traumatic stress disorder)  F43.10 309.81   4. Cannabis dependence, uncomplicated  F12.20 304.30       Plan:  individual psychotherapy    Return to clinic: 1 week    Length of Service (minutes): 60

## 2023-05-02 ENCOUNTER — OFFICE VISIT (OUTPATIENT)
Dept: PSYCHIATRY | Facility: CLINIC | Age: 33
End: 2023-05-02
Payer: COMMERCIAL

## 2023-05-02 DIAGNOSIS — F43.10 PTSD (POST-TRAUMATIC STRESS DISORDER): ICD-10-CM

## 2023-05-02 DIAGNOSIS — F41.1 GAD (GENERALIZED ANXIETY DISORDER): ICD-10-CM

## 2023-05-02 DIAGNOSIS — F33.1 MAJOR DEPRESSIVE DISORDER, RECURRENT, MODERATE: Primary | ICD-10-CM

## 2023-05-02 PROCEDURE — 90837 PR PSYCHOTHERAPY W/PATIENT, 60 MIN: ICD-10-PCS | Mod: S$GLB,,, | Performed by: SOCIAL WORKER

## 2023-05-02 PROCEDURE — 90837 PSYTX W PT 60 MINUTES: CPT | Mod: S$GLB,,, | Performed by: SOCIAL WORKER

## 2023-05-02 NOTE — PROGRESS NOTES
Individual Psychotherapy (PhD/LCSW)    5/2/2023      Site:  [facility]         Therapeutic Intervention: Met with patient.  Outpatient - Insight oriented psychotherapy 60 min - CPT code 71640, Outpatient - Behavior modifying psychotherapy 60 min - CPT code 89951 and Outpatient - Supportive psychotherapy 60 min - CPT Code 07197    Chief complaint/reason for encounter: PTSD     Interval history and content of current session: Pt is a 32 year-old single white female who presents this afternoon for a follow up therapy session.       Cognitive Processing Therapy (CPT), Session #6  Patterns of Problematic Thinking Worksheet and Introduction to Challenging Beliefs Worksheet  PCL-5:  16  PHQ-9: 7  Worksheets completed:  yes    Session Focus:  Stuck Point log  Patterns of Problematic Thinking worksheets review  Introduced Challenging Beliefs worksheet     Practice Assignment:  1) Stuck point log - Add to the stuck point log as needed  2) Challenging Beliefs worksheets - Choose Stuck Points from the Stuck Point Log that are in need of attention, and write them down on the worksheets to complete outside of session.  Complete one worksheet every day.      Treatment plan:  Target symptoms: anxiety , adjustment, grief, work stress, PTSD  Why chosen therapy is appropriate versus another modality: relevant to diagnosis, patient responds to this modality, evidence based practice  Outcome monitoring methods: self-report, observation  Therapeutic intervention type: insight oriented psychotherapy, behavior modifying psychotherapy, supportive psychotherapy    Risk parameters:  Patient reports no suicidal ideation  Patient reports no homicidal ideation  Patient reports no self-injurious behavior  Patient reports no violent behavior    Verbal deficits: None    Patient's response to intervention:  The patient's response to intervention is accepting.    Progress toward goals and other mental status changes:  The patient's progress  toward goals is excellent.    Diagnosis:     ICD-10-CM ICD-9-CM   1. Major depressive disorder, recurrent, moderate  F33.1 296.32   2. SABI (generalized anxiety disorder)  F41.1 300.02   3. PTSD (post-traumatic stress disorder)  F43.10 309.81       Plan:  individual psychotherapy    Return to clinic: 1 week    Length of Service (minutes): 60

## 2023-05-09 ENCOUNTER — OFFICE VISIT (OUTPATIENT)
Dept: PSYCHIATRY | Facility: CLINIC | Age: 33
End: 2023-05-09
Payer: COMMERCIAL

## 2023-05-09 DIAGNOSIS — F41.1 GAD (GENERALIZED ANXIETY DISORDER): ICD-10-CM

## 2023-05-09 DIAGNOSIS — F33.1 MAJOR DEPRESSIVE DISORDER, RECURRENT, MODERATE: Primary | ICD-10-CM

## 2023-05-09 DIAGNOSIS — F12.20 CANNABIS DEPENDENCE, UNCOMPLICATED: ICD-10-CM

## 2023-05-09 PROCEDURE — 90837 PSYTX W PT 60 MINUTES: CPT | Mod: S$GLB,,, | Performed by: SOCIAL WORKER

## 2023-05-09 PROCEDURE — 90837 PR PSYCHOTHERAPY W/PATIENT, 60 MIN: ICD-10-PCS | Mod: S$GLB,,, | Performed by: SOCIAL WORKER
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
## 2023-05-15 ENCOUNTER — PATIENT MESSAGE (OUTPATIENT)
Dept: PSYCHIATRY | Facility: CLINIC | Age: 33
End: 2023-05-15
Payer: COMMERCIAL

## 2023-05-15 ENCOUNTER — OFFICE VISIT (OUTPATIENT)
Dept: URGENT CARE | Facility: CLINIC | Age: 33
End: 2023-05-15
Payer: COMMERCIAL

## 2023-05-15 VITALS
SYSTOLIC BLOOD PRESSURE: 100 MMHG | WEIGHT: 147.69 LBS | BODY MASS INDEX: 22.38 KG/M2 | OXYGEN SATURATION: 98 % | TEMPERATURE: 98 F | HEART RATE: 86 BPM | DIASTOLIC BLOOD PRESSURE: 73 MMHG | RESPIRATION RATE: 16 BRPM | HEIGHT: 68 IN

## 2023-05-15 DIAGNOSIS — B96.89 ACUTE BRONCHITIS, BACTERIAL: Primary | ICD-10-CM

## 2023-05-15 DIAGNOSIS — R05.9 COUGH, UNSPECIFIED TYPE: ICD-10-CM

## 2023-05-15 DIAGNOSIS — J20.8 ACUTE BRONCHITIS, BACTERIAL: Primary | ICD-10-CM

## 2023-05-15 LAB
CTP QC/QA: YES
SARS-COV-2 AG RESP QL IA.RAPID: NEGATIVE

## 2023-05-15 PROCEDURE — 99213 OFFICE O/P EST LOW 20 MIN: CPT | Mod: S$GLB,,, | Performed by: NURSE PRACTITIONER

## 2023-05-15 PROCEDURE — 87811 SARS-COV-2 COVID19 W/OPTIC: CPT | Mod: QW,S$GLB,, | Performed by: NURSE PRACTITIONER

## 2023-05-15 PROCEDURE — 99213 PR OFFICE/OUTPT VISIT, EST, LEVL III, 20-29 MIN: ICD-10-PCS | Mod: S$GLB,,, | Performed by: NURSE PRACTITIONER

## 2023-05-15 PROCEDURE — 87811 SARS CORONAVIRUS 2 ANTIGEN POCT, MANUAL READ: ICD-10-PCS | Mod: QW,S$GLB,, | Performed by: NURSE PRACTITIONER

## 2023-05-15 RX ORDER — PROMETHAZINE HYDROCHLORIDE AND DEXTROMETHORPHAN HYDROBROMIDE 6.25; 15 MG/5ML; MG/5ML
5 SYRUP ORAL NIGHTLY PRN
Qty: 120 ML | Refills: 0 | Status: SHIPPED | OUTPATIENT
Start: 2023-05-15 | End: 2023-05-25

## 2023-05-15 RX ORDER — BENZONATATE 200 MG/1
200 CAPSULE ORAL 3 TIMES DAILY PRN
Qty: 30 CAPSULE | Refills: 0 | Status: SHIPPED | OUTPATIENT
Start: 2023-05-15 | End: 2023-05-25

## 2023-05-15 RX ORDER — ALBUTEROL SULFATE 90 UG/1
2 AEROSOL, METERED RESPIRATORY (INHALATION) EVERY 6 HOURS PRN
Qty: 18 G | Refills: 0 | Status: SHIPPED | OUTPATIENT
Start: 2023-05-15 | End: 2024-05-14

## 2023-05-15 RX ORDER — BNT162B2 ORIGINAL AND OMICRON BA.4/BA.5 .1125; .1125 MG/2.25ML; MG/2.25ML
INJECTION, SUSPENSION INTRAMUSCULAR
COMMUNITY
Start: 2022-11-18

## 2023-05-15 RX ORDER — AMOXICILLIN AND CLAVULANATE POTASSIUM 875; 125 MG/1; MG/1
1 TABLET, FILM COATED ORAL 2 TIMES DAILY
Qty: 20 TABLET | Refills: 0 | Status: SHIPPED | OUTPATIENT
Start: 2023-05-15 | End: 2023-05-25

## 2023-05-15 RX ORDER — INFLUENZA A VIRUS A/VICTORIA/2570/2019 IVR-215 (H1N1) ANTIGEN (FORMALDEHYDE INACTIVATED), INFLUENZA A VIRUS A/DARWIN/9/2021 SAN-010 (H3N2) ANTIGEN (FORMALDEHYDE INACTIVATED), INFLUENZA B VIRUS B/PHUKET/3073/2013 ANTIGEN (FORMALDEHYDE INACTIVATED), AND INFLUENZA B VIRUS B/MICHIGAN/01/2021 ANTIGEN (FORMALDEHYDE INACTIVATED) 15; 15; 15; 15 UG/.5ML; UG/.5ML; UG/.5ML; UG/.5ML
INJECTION, SUSPENSION INTRAMUSCULAR
COMMUNITY
Start: 2022-11-18

## 2023-05-15 RX ORDER — PREDNISONE 20 MG/1
20 TABLET ORAL DAILY
Qty: 3 TABLET | Refills: 0 | Status: SHIPPED | OUTPATIENT
Start: 2023-05-15 | End: 2023-05-18

## 2023-05-15 NOTE — PROGRESS NOTES
"Subjective:      Patient ID: Aby Correa is a 32 y.o. female.    Vitals:  height is 5' 8" (1.727 m) and weight is 67 kg (147 lb 11.3 oz). Her oral temperature is 98.2 °F (36.8 °C). Her blood pressure is 100/73 and her pulse is 86. Her respiration is 16 and oxygen saturation is 98%.     Chief Complaint: Sinus Problem    Pt is in with a possible sinus infection. Sx began Tuesday of last week 5/9. Pt is feeling sinus pressure, headache, cough, and nasal/chest congestion. Productive cough (green) over the last 48 hours.   Provider note begins below  Reports productive cough and wheezing at times.  Denies any shortness of breath or painful breathing.      Sinus Problem  This is a new problem. The current episode started in the past 7 days. Associated symptoms include congestion, coughing, headaches, sinus pressure and sneezing. Pertinent negatives include no ear pain (on day 3, but has gone away) or sore throat (present on day 4, has gone away). Past treatments include acetaminophen (mucinex cold and flu). The treatment provided mild (helped with sinus headache) relief.     HENT:  Positive for congestion and sinus pressure. Negative for ear pain (on day 3, but has gone away) and sore throat (present on day 4, has gone away).    Respiratory:  Positive for cough.    Allergic/Immunologic: Positive for sneezing.   Neurological:  Positive for headaches.    Objective:     Physical Exam   Constitutional: She is oriented to person, place, and time.   HENT:   Head: Normocephalic and atraumatic.   Ears:   Right Ear: Tympanic membrane, external ear and ear canal normal. impacted cerumen  Left Ear: Tympanic membrane, external ear and ear canal normal. impacted cerumen  Nose: Rhinorrhea present. No congestion.   Mouth/Throat: No oropharyngeal exudate or posterior oropharyngeal erythema.   Cardiovascular: Normal rate and regular rhythm.   Pulmonary/Chest: Effort normal. No stridor. No respiratory distress. She has no wheezes. She " has no rhonchi. She has no rales. She exhibits no tenderness.   Abdominal: Normal appearance.   Neurological: She is alert and oriented to person, place, and time.   Skin: Skin is warm and dry.   Psychiatric: Her behavior is normal. Mood normal.       Results for orders placed or performed in visit on 05/15/23   SARS Coronavirus 2 Antigen, POCT Manual Read   Result Value Ref Range    SARS Coronavirus 2 Antigen Negative Negative     Acceptable Yes       Assessment:     1. Acute bronchitis, bacterial    2. Cough, unspecified type        Plan:   COVID test negative   Antibiotics, short course of steroids, inhaler, and cough medication  Follow-up if symptoms worsen or do not improve.          Acute bronchitis, bacterial  -     amoxicillin-clavulanate 875-125mg (AUGMENTIN) 875-125 mg per tablet; Take 1 tablet by mouth 2 (two) times daily. for 10 days  Dispense: 20 tablet; Refill: 0  -     benzonatate (TESSALON) 200 MG capsule; Take 1 capsule (200 mg total) by mouth 3 (three) times daily as needed for Cough.  Dispense: 30 capsule; Refill: 0  -     promethazine-dextromethorphan (PROMETHAZINE-DM) 6.25-15 mg/5 mL Syrp; Take 5 mLs by mouth nightly as needed (cough).  Dispense: 120 mL; Refill: 0  -     albuterol (VENTOLIN HFA) 90 mcg/actuation inhaler; Inhale 2 puffs into the lungs every 6 (six) hours as needed for Wheezing. Rescue  Dispense: 18 g; Refill: 0  -     predniSONE (DELTASONE) 20 MG tablet; Take 1 tablet (20 mg total) by mouth once daily. for 3 days  Dispense: 3 tablet; Refill: 0    Cough, unspecified type  -     SARS Coronavirus 2 Antigen, POCT Manual Read  -     benzonatate (TESSALON) 200 MG capsule; Take 1 capsule (200 mg total) by mouth 3 (three) times daily as needed for Cough.  Dispense: 30 capsule; Refill: 0  -     promethazine-dextromethorphan (PROMETHAZINE-DM) 6.25-15 mg/5 mL Syrp; Take 5 mLs by mouth nightly as needed (cough).  Dispense: 120 mL; Refill: 0  -     albuterol (VENTOLIN HFA) 90  mcg/actuation inhaler; Inhale 2 puffs into the lungs every 6 (six) hours as needed for Wheezing. Rescue  Dispense: 18 g; Refill: 0

## 2023-05-17 ENCOUNTER — PATIENT MESSAGE (OUTPATIENT)
Dept: PSYCHIATRY | Facility: CLINIC | Age: 33
End: 2023-05-17
Payer: COMMERCIAL

## 2023-06-29 ENCOUNTER — OFFICE VISIT (OUTPATIENT)
Dept: PSYCHIATRY | Facility: CLINIC | Age: 33
End: 2023-06-29
Payer: COMMERCIAL

## 2023-06-29 DIAGNOSIS — F43.10 PTSD (POST-TRAUMATIC STRESS DISORDER): ICD-10-CM

## 2023-06-29 DIAGNOSIS — F41.1 GAD (GENERALIZED ANXIETY DISORDER): ICD-10-CM

## 2023-06-29 DIAGNOSIS — F12.20 CANNABIS DEPENDENCE, UNCOMPLICATED: ICD-10-CM

## 2023-06-29 DIAGNOSIS — F33.1 MAJOR DEPRESSIVE DISORDER, RECURRENT, MODERATE: Primary | ICD-10-CM

## 2023-06-29 PROCEDURE — 90837 PR PSYCHOTHERAPY W/PATIENT, 60 MIN: ICD-10-PCS | Mod: S$GLB,,, | Performed by: SOCIAL WORKER

## 2023-06-29 PROCEDURE — 90837 PSYTX W PT 60 MINUTES: CPT | Mod: S$GLB,,, | Performed by: SOCIAL WORKER
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
## 2023-07-13 ENCOUNTER — OFFICE VISIT (OUTPATIENT)
Dept: PSYCHIATRY | Facility: CLINIC | Age: 33
End: 2023-07-13
Payer: COMMERCIAL

## 2023-07-13 DIAGNOSIS — F43.10 PTSD (POST-TRAUMATIC STRESS DISORDER): ICD-10-CM

## 2023-07-13 DIAGNOSIS — F33.1 MAJOR DEPRESSIVE DISORDER, RECURRENT, MODERATE: Primary | ICD-10-CM

## 2023-07-13 DIAGNOSIS — F41.1 GAD (GENERALIZED ANXIETY DISORDER): ICD-10-CM

## 2023-07-13 PROCEDURE — 90834 PR PSYCHOTHERAPY W/PATIENT, 45 MIN: ICD-10-PCS | Mod: S$GLB,,, | Performed by: SOCIAL WORKER

## 2023-07-13 PROCEDURE — 90834 PSYTX W PT 45 MINUTES: CPT | Mod: S$GLB,,, | Performed by: SOCIAL WORKER
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
## 2023-07-26 ENCOUNTER — PATIENT MESSAGE (OUTPATIENT)
Dept: PSYCHIATRY | Facility: CLINIC | Age: 33
End: 2023-07-26
Payer: COMMERCIAL

## 2023-07-30 ENCOUNTER — PATIENT MESSAGE (OUTPATIENT)
Dept: PSYCHIATRY | Facility: CLINIC | Age: 33
End: 2023-07-30
Payer: COMMERCIAL

## 2023-08-16 ENCOUNTER — PATIENT MESSAGE (OUTPATIENT)
Dept: PSYCHIATRY | Facility: CLINIC | Age: 33
End: 2023-08-16
Payer: COMMERCIAL

## 2023-08-24 ENCOUNTER — PATIENT MESSAGE (OUTPATIENT)
Dept: PSYCHIATRY | Facility: CLINIC | Age: 33
End: 2023-08-24
Payer: COMMERCIAL

## 2023-08-24 ENCOUNTER — OFFICE VISIT (OUTPATIENT)
Dept: PSYCHIATRY | Facility: CLINIC | Age: 33
End: 2023-08-24
Payer: COMMERCIAL

## 2023-08-24 DIAGNOSIS — F41.1 GAD (GENERALIZED ANXIETY DISORDER): ICD-10-CM

## 2023-08-24 DIAGNOSIS — F33.1 MAJOR DEPRESSIVE DISORDER, RECURRENT, MODERATE: Primary | ICD-10-CM

## 2023-08-24 DIAGNOSIS — F43.10 PTSD (POST-TRAUMATIC STRESS DISORDER): ICD-10-CM

## 2023-08-24 PROCEDURE — 90837 PR PSYCHOTHERAPY W/PATIENT, 60 MIN: ICD-10-PCS | Mod: S$GLB,,, | Performed by: SOCIAL WORKER

## 2023-08-24 PROCEDURE — 90837 PSYTX W PT 60 MINUTES: CPT | Mod: S$GLB,,, | Performed by: SOCIAL WORKER

## 2023-08-24 NOTE — PROGRESS NOTES
"  Individual Psychotherapy (PhD/LCSW)    8/24/2023      Site:  [facility]         Therapeutic Intervention: Met with patient.    Outpatient - Insight oriented psychotherapy 60 min - CPT code 97417, Outpatient - Behavior modifying psychotherapy 60 min - CPT code 02470, Outpatient - Supportive psychotherapy 60 min - CPT Code 70064, and Outpatient - Interactive psychotherapy 60 min - CPT code 66954      Chief complaint/reason for encounter: PTSD, depression, anxiety     Interval history and content of current session: Pt is a 32 year-old single white female who presents this afternoon for a follow up therapy session. Pt reports that the start of school has been extremely hectic. Pt endorses a stressful dynamic with her coworker who is the . Feels like she can be demeaning and demanding. Pt struggling to set boundaries or express how she feels in a direct manner for fear that this will make her coworker blow up. Pt recognizes her role in sustaining the dynamic. Pt reports that she has had a lot of anxiety over the past few weeks. States that she is still smoking spliffs (tobacco and marijuana) when she gets home from work as an immediate way to cope with stress. Pt acknowledges that she does not want to utilize spliffs as a coping mechanism and shows good insight into the benefits of stopping. Pt plans on writing a letter to the spliff as though it were her friend to review why they need to "break up." Pt reports that appetite is very limited. She has not been eating much. Eats one meal a day in the evenings. Still drinking some alcohol. Pt reports that she would like to try and add a smoothie in the morning as this jump starts her appetite. Acknowledges she is not drinking enough water. Discussed going to the gym right after school even if it is just to sit in the steam/sauna (assuming pt drinks and eats an adequate amount before doing this). Discussed adding in a light amount of exercise. Pt " states she is sleeping well.     Pt to consider working on at least one of the goals listed above.     Treatment plan:  Target symptoms: anxiety , adjustment, grief, work stress, PTSD  Why chosen therapy is appropriate versus another modality: relevant to diagnosis, patient responds to this modality, evidence based practice  Outcome monitoring methods: self-report, observation  Therapeutic intervention type: insight oriented psychotherapy, behavior modifying psychotherapy, supportive psychotherapy    Risk parameters:  Patient reports no suicidal ideation  Patient reports no homicidal ideation  Patient reports no self-injurious behavior  Patient reports no violent behavior    Verbal deficits: None    Patient's response to intervention:  The patient's response to intervention is accepting.    Progress toward goals and other mental status changes:  The patient's progress toward goals is excellent.    Diagnosis:     ICD-10-CM ICD-9-CM   1. Major depressive disorder, recurrent, moderate  F33.1 296.32   2. SABI (generalized anxiety disorder)  F41.1 300.02   3. PTSD (post-traumatic stress disorder)  F43.10 309.81       Plan:  individual psychotherapy    Return to clinic: 1 week    Length of Service (minutes): 60

## 2023-08-25 ENCOUNTER — PATIENT MESSAGE (OUTPATIENT)
Dept: PSYCHIATRY | Facility: CLINIC | Age: 33
End: 2023-08-25
Payer: COMMERCIAL

## 2023-08-31 ENCOUNTER — OFFICE VISIT (OUTPATIENT)
Dept: PSYCHIATRY | Facility: CLINIC | Age: 33
End: 2023-08-31
Payer: COMMERCIAL

## 2023-08-31 DIAGNOSIS — F12.20 CANNABIS DEPENDENCE, UNCOMPLICATED: ICD-10-CM

## 2023-08-31 DIAGNOSIS — F43.10 PTSD (POST-TRAUMATIC STRESS DISORDER): ICD-10-CM

## 2023-08-31 DIAGNOSIS — F33.1 MAJOR DEPRESSIVE DISORDER, RECURRENT, MODERATE: Primary | ICD-10-CM

## 2023-08-31 DIAGNOSIS — F41.1 GAD (GENERALIZED ANXIETY DISORDER): ICD-10-CM

## 2023-08-31 PROCEDURE — 90834 PR PSYCHOTHERAPY W/PATIENT, 45 MIN: ICD-10-PCS | Mod: S$GLB,,, | Performed by: SOCIAL WORKER

## 2023-08-31 PROCEDURE — 90834 PSYTX W PT 45 MINUTES: CPT | Mod: S$GLB,,, | Performed by: SOCIAL WORKER
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
## 2023-09-01 RX ORDER — AMITRIPTYLINE HYDROCHLORIDE 25 MG/1
25 TABLET, FILM COATED ORAL NIGHTLY
Qty: 90 TABLET | Refills: 1 | Status: SHIPPED | OUTPATIENT
Start: 2023-09-01 | End: 2024-01-25 | Stop reason: DRUGHIGH

## 2023-09-05 ENCOUNTER — PATIENT MESSAGE (OUTPATIENT)
Dept: PSYCHIATRY | Facility: CLINIC | Age: 33
End: 2023-09-05
Payer: COMMERCIAL

## 2023-09-06 ENCOUNTER — OFFICE VISIT (OUTPATIENT)
Dept: PSYCHIATRY | Facility: CLINIC | Age: 33
End: 2023-09-06
Payer: COMMERCIAL

## 2023-09-06 DIAGNOSIS — F33.1 MAJOR DEPRESSIVE DISORDER, RECURRENT, MODERATE: Primary | ICD-10-CM

## 2023-09-06 DIAGNOSIS — F41.1 GAD (GENERALIZED ANXIETY DISORDER): ICD-10-CM

## 2023-09-06 DIAGNOSIS — F43.10 PTSD (POST-TRAUMATIC STRESS DISORDER): ICD-10-CM

## 2023-09-06 PROCEDURE — 90834 PSYTX W PT 45 MINUTES: CPT | Mod: 95,,, | Performed by: SOCIAL WORKER

## 2023-09-06 PROCEDURE — 90834 PR PSYCHOTHERAPY W/PATIENT, 45 MIN: ICD-10-PCS | Mod: 95,,, | Performed by: SOCIAL WORKER
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
## 2023-09-14 ENCOUNTER — OFFICE VISIT (OUTPATIENT)
Dept: PSYCHIATRY | Facility: CLINIC | Age: 33
End: 2023-09-14
Payer: COMMERCIAL

## 2023-09-14 DIAGNOSIS — F41.1 GAD (GENERALIZED ANXIETY DISORDER): ICD-10-CM

## 2023-09-14 DIAGNOSIS — F33.1 MAJOR DEPRESSIVE DISORDER, RECURRENT, MODERATE: Primary | ICD-10-CM

## 2023-09-14 DIAGNOSIS — F43.10 PTSD (POST-TRAUMATIC STRESS DISORDER): ICD-10-CM

## 2023-09-14 PROCEDURE — 90837 PR PSYCHOTHERAPY W/PATIENT, 60 MIN: ICD-10-PCS | Mod: S$GLB,,, | Performed by: SOCIAL WORKER

## 2023-09-14 PROCEDURE — 90837 PSYTX W PT 60 MINUTES: CPT | Mod: S$GLB,,, | Performed by: SOCIAL WORKER
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
## 2023-09-22 ENCOUNTER — PATIENT MESSAGE (OUTPATIENT)
Dept: PSYCHIATRY | Facility: CLINIC | Age: 33
End: 2023-09-22
Payer: COMMERCIAL

## 2023-09-22 ENCOUNTER — OFFICE VISIT (OUTPATIENT)
Dept: PSYCHIATRY | Facility: CLINIC | Age: 33
End: 2023-09-22
Payer: COMMERCIAL

## 2023-09-22 DIAGNOSIS — F43.10 PTSD (POST-TRAUMATIC STRESS DISORDER): ICD-10-CM

## 2023-09-22 DIAGNOSIS — F33.1 MAJOR DEPRESSIVE DISORDER, RECURRENT, MODERATE: Primary | ICD-10-CM

## 2023-09-22 DIAGNOSIS — F41.1 GAD (GENERALIZED ANXIETY DISORDER): ICD-10-CM

## 2023-09-22 PROCEDURE — 90837 PR PSYCHOTHERAPY W/PATIENT, 60 MIN: ICD-10-PCS | Mod: S$GLB,,, | Performed by: SOCIAL WORKER

## 2023-09-22 PROCEDURE — 90837 PSYTX W PT 60 MINUTES: CPT | Mod: S$GLB,,, | Performed by: SOCIAL WORKER
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
## 2023-09-28 ENCOUNTER — OFFICE VISIT (OUTPATIENT)
Dept: PSYCHIATRY | Facility: CLINIC | Age: 33
End: 2023-09-28
Payer: COMMERCIAL

## 2023-09-28 DIAGNOSIS — F43.10 PTSD (POST-TRAUMATIC STRESS DISORDER): ICD-10-CM

## 2023-09-28 DIAGNOSIS — F41.1 GAD (GENERALIZED ANXIETY DISORDER): ICD-10-CM

## 2023-09-28 DIAGNOSIS — F33.1 MAJOR DEPRESSIVE DISORDER, RECURRENT, MODERATE: Primary | ICD-10-CM

## 2023-09-28 PROCEDURE — 90837 PSYTX W PT 60 MINUTES: CPT | Mod: S$GLB,,, | Performed by: SOCIAL WORKER

## 2023-09-28 PROCEDURE — 90837 PR PSYCHOTHERAPY W/PATIENT, 60 MIN: ICD-10-PCS | Mod: S$GLB,,, | Performed by: SOCIAL WORKER

## 2023-09-28 NOTE — PROGRESS NOTES
Individual Psychotherapy (PhD/LCSW)    9/28/2023        Site:  [facility]         Therapeutic Intervention: Met with patient.     Outpatient - Insight oriented psychotherapy 60 min - CPT code 40287, Outpatient - Behavior modifying psychotherapy 60 min - CPT code 55856, Outpatient - Supportive psychotherapy 60 min - CPT Code 44928, and Outpatient - Interactive psychotherapy 60 min - CPT code 90252       Chief complaint/reason for encounter: PTSD, depression, anxiety     Interval history and content of current session: Pt is a 32 year-old single white female who presents this afternoon for a follow up therapy session. Pt feeling overwhelmed, tearful. States that she went to practice last Sunday.Reports it was hard to determine who knew about the incident and who didn't. Felt uncomfortable when seeing friends of Rita at practice. Has decided that she is done talking to other people about the incident. Feels somewhat avoidant. Almost didn't come to therapy today. Decided to use this time to catch up on emails and phone calls as this feels most helpful right now due to being more avoidant when getting home. Clinician provided space during session for pt to catch up. Pt feeling better after getting some time to do this. Focused on some of the things she has been doing for herself like making smoothies for lunch during her break. Examined how pt was able to push herself to get up in the morning even when feeling the dread. Has gone to work every day this week. Plan is for pt to take it day by day. 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       Treatment plan:  Target symptoms: anxiety , adjustment, grief, work stress, PTSD  Why chosen therapy is appropriate versus another modality: relevant to diagnosis, patient responds to this modality, evidence based practice  Outcome monitoring methods: self-report, observation  Therapeutic intervention type:  insight oriented psychotherapy, behavior modifying psychotherapy, supportive psychotherapy    Risk parameters:  Patient reports no suicidal ideation  Patient reports no homicidal ideation  Patient reports no self-injurious behavior  Patient reports no violent behavior    Verbal deficits: None    Patient's response to intervention:  The patient's response to intervention is accepting.    Progress toward goals and other mental status changes:  The patient's progress toward goals is excellent.    Diagnosis:     ICD-10-CM ICD-9-CM   1. Major depressive disorder, recurrent, moderate  F33.1 296.32   2. SABI (generalized anxiety disorder)  F41.1 300.02   3. PTSD (post-traumatic stress disorder)  F43.10 309.81       Plan:  individual psychotherapy    Return to clinic: 1 week    Length of Service (minutes): 60

## 2023-10-09 ENCOUNTER — PATIENT MESSAGE (OUTPATIENT)
Dept: PSYCHIATRY | Facility: CLINIC | Age: 33
End: 2023-10-09
Payer: COMMERCIAL

## 2023-10-19 ENCOUNTER — OFFICE VISIT (OUTPATIENT)
Dept: PSYCHIATRY | Facility: CLINIC | Age: 33
End: 2023-10-19
Payer: COMMERCIAL

## 2023-10-19 DIAGNOSIS — F33.1 MAJOR DEPRESSIVE DISORDER, RECURRENT, MODERATE: Primary | ICD-10-CM

## 2023-10-19 DIAGNOSIS — F43.10 PTSD (POST-TRAUMATIC STRESS DISORDER): ICD-10-CM

## 2023-10-19 DIAGNOSIS — F41.1 GAD (GENERALIZED ANXIETY DISORDER): ICD-10-CM

## 2023-10-19 PROCEDURE — 90837 PSYTX W PT 60 MINUTES: CPT | Mod: S$GLB,,, | Performed by: SOCIAL WORKER

## 2023-10-19 PROCEDURE — 90837 PR PSYCHOTHERAPY W/PATIENT, 60 MIN: ICD-10-PCS | Mod: S$GLB,,, | Performed by: SOCIAL WORKER
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
## 2023-10-26 ENCOUNTER — OFFICE VISIT (OUTPATIENT)
Dept: PSYCHIATRY | Facility: CLINIC | Age: 33
End: 2023-10-26
Payer: COMMERCIAL

## 2023-10-26 DIAGNOSIS — F43.10 PTSD (POST-TRAUMATIC STRESS DISORDER): ICD-10-CM

## 2023-10-26 DIAGNOSIS — F33.1 MAJOR DEPRESSIVE DISORDER, RECURRENT, MODERATE: Primary | ICD-10-CM

## 2023-10-26 DIAGNOSIS — F41.1 GAD (GENERALIZED ANXIETY DISORDER): ICD-10-CM

## 2023-10-26 PROCEDURE — 90837 PSYTX W PT 60 MINUTES: CPT | Mod: S$GLB,,, | Performed by: SOCIAL WORKER

## 2023-10-26 PROCEDURE — 90837 PR PSYCHOTHERAPY W/PATIENT, 60 MIN: ICD-10-PCS | Mod: S$GLB,,, | Performed by: SOCIAL WORKER
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
## 2023-11-09 ENCOUNTER — OFFICE VISIT (OUTPATIENT)
Dept: PSYCHIATRY | Facility: CLINIC | Age: 33
End: 2023-11-09
Payer: COMMERCIAL

## 2023-11-09 DIAGNOSIS — F33.1 MAJOR DEPRESSIVE DISORDER, RECURRENT, MODERATE: Primary | ICD-10-CM

## 2023-11-09 DIAGNOSIS — F43.10 PTSD (POST-TRAUMATIC STRESS DISORDER): ICD-10-CM

## 2023-11-09 DIAGNOSIS — F41.1 GAD (GENERALIZED ANXIETY DISORDER): ICD-10-CM

## 2023-11-09 PROCEDURE — 90837 PSYTX W PT 60 MINUTES: CPT | Mod: S$GLB,,, | Performed by: SOCIAL WORKER

## 2023-11-09 PROCEDURE — 90837 PR PSYCHOTHERAPY W/PATIENT, 60 MIN: ICD-10-PCS | Mod: S$GLB,,, | Performed by: SOCIAL WORKER
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
## 2023-11-17 ENCOUNTER — OFFICE VISIT (OUTPATIENT)
Dept: PSYCHIATRY | Facility: CLINIC | Age: 33
End: 2023-11-17
Payer: COMMERCIAL

## 2023-11-17 DIAGNOSIS — F33.1 MAJOR DEPRESSIVE DISORDER, RECURRENT, MODERATE: Primary | ICD-10-CM

## 2023-11-17 DIAGNOSIS — F43.10 PTSD (POST-TRAUMATIC STRESS DISORDER): ICD-10-CM

## 2023-11-17 DIAGNOSIS — F41.1 GAD (GENERALIZED ANXIETY DISORDER): ICD-10-CM

## 2023-11-17 PROCEDURE — 90837 PR PSYCHOTHERAPY W/PATIENT, 60 MIN: ICD-10-PCS | Mod: S$GLB,,, | Performed by: SOCIAL WORKER

## 2023-11-17 PROCEDURE — 90837 PSYTX W PT 60 MINUTES: CPT | Mod: S$GLB,,, | Performed by: SOCIAL WORKER

## 2023-11-17 NOTE — PROGRESS NOTES
Individual Psychotherapy (PhD/LCSW)    11/17/2023        Site:  [facility]         Therapeutic Intervention: Met with patient.     Outpatient - Insight oriented psychotherapy 60 min - CPT code 67013, Outpatient - Behavior modifying psychotherapy 60 min - CPT code 84912, Outpatient - Supportive psychotherapy 60 min - CPT Code 75269, and Outpatient - Interactive psychotherapy 60 min - CPT code 40807       Chief complaint/reason for encounter: PTSD, depression, anxiety     Interval history and content of current session: Pt is a 33 year-old single white female who presents this afternoon for a follow up therapy session. Pt still struggling with moderate depressive symptoms. Reports that she is struggling to get to work on time due to staying in bed past the time when she needs to leave. States that sometimes she will even come into work hours later than she is expected. Reports that she has bouts of tearfulness. Has been unable to support boyfriend in his own issues with work stress. Pt denies SI/HI/SIB. States that she is not at her baseline and feels she needs to get additional tools and review of medication options. Pt reports that she spoke with mom who was encouraging. Pt endorses financial worry with taking two weeks off, however pt was reassured by her mother that her grandmother would be able to assist. Pt requested to speak with someone from the [facility]. Clinician able to bring in Katie Osullivan LMSW with pt's permission. Katie answered pt's questions and gave a summary of how the program runs. Pt endorsing anxiety and fear about starting and being in groups but continues to believe it is the next step. Pt agreeable to starting on Dec 11th if insurance cleared.    Pt reports plans to spend time with mother and close friends for Thanksgiving. Still frustrated with boyfriend who struggles with erectile issues. Would like him to go on medication for this but unsure if he will agree.  Pt reports looking forward to a week off from school. Is house sitting and dog-sitting at a home with a heated pool and hot tub which pt looking forward to using.      Treatment plan:  Target symptoms: anxiety , adjustment, grief, work stress, PTSD  Why chosen therapy is appropriate versus another modality: relevant to diagnosis, patient responds to this modality, evidence based practice  Outcome monitoring methods: self-report, observation  Therapeutic intervention type: insight oriented psychotherapy, behavior modifying psychotherapy, supportive psychotherapy    Risk parameters:  Patient reports no suicidal ideation  Patient reports no homicidal ideation  Patient reports no self-injurious behavior  Patient reports no violent behavior    Verbal deficits: None    Patient's response to intervention:  The patient's response to intervention is accepting.    Progress toward goals and other mental status changes:  The patient's progress toward goals is excellent.    Diagnosis:     ICD-10-CM ICD-9-CM   1. Major depressive disorder, recurrent, moderate  F33.1 296.32   2. SABI (generalized anxiety disorder)  F41.1 300.02   3. PTSD (post-traumatic stress disorder)  F43.10 309.81       Plan:  individual psychotherapy    Return to clinic: 1 week    Length of Service (minutes): 60

## 2023-11-27 DIAGNOSIS — F41.1 GAD (GENERALIZED ANXIETY DISORDER): Primary | ICD-10-CM

## 2023-11-27 RX ORDER — CLONAZEPAM 0.5 MG/1
0.5 TABLET ORAL 2 TIMES DAILY PRN
Qty: 60 TABLET | Refills: 3 | Status: CANCELLED | OUTPATIENT
Start: 2023-11-27 | End: 2024-11-26

## 2023-11-28 RX ORDER — CLONAZEPAM 0.5 MG/1
0.5 TABLET ORAL 2 TIMES DAILY PRN
Qty: 60 TABLET | Refills: 3 | Status: SHIPPED | OUTPATIENT
Start: 2023-11-28 | End: 2024-11-27

## 2023-12-11 ENCOUNTER — HOSPITAL ENCOUNTER (OUTPATIENT)
Dept: PSYCHIATRY | Facility: HOSPITAL | Age: 33
Discharge: HOME | End: 2023-12-11
Attending: PSYCHIATRY & NEUROLOGY
Payer: COMMERCIAL

## 2023-12-11 DIAGNOSIS — F33.1 MAJOR DEPRESSIVE DISORDER, RECURRENT, MODERATE: Primary | ICD-10-CM

## 2023-12-11 PROCEDURE — 90792 PR PSYCHIATRIC DIAGNOSTIC EVALUATION W/MEDICAL SERVICES: ICD-10-PCS | Mod: ,,, | Performed by: PSYCHIATRY & NEUROLOGY

## 2023-12-11 PROCEDURE — 90853 PR GROUP PSYCHOTHERAPY: ICD-10-PCS | Mod: ,,, | Performed by: PSYCHOLOGIST

## 2023-12-11 PROCEDURE — 90792 PSYCH DIAG EVAL W/MED SRVCS: CPT | Mod: ,,, | Performed by: PSYCHIATRY & NEUROLOGY

## 2023-12-11 PROCEDURE — 90853 GROUP PSYCHOTHERAPY: CPT | Mod: ,,, | Performed by: PSYCHOLOGIST

## 2023-12-11 NOTE — PROGRESS NOTES
Group Psychotherapy (PhD/LCSW)     Site: [facility]     Clinical status of patient: Intensive Outpatient Program (IOP)     Date: 12/11/2023     Group Focus: Distress Tolerance     Length of service: 74473 - 45-60 minutes     Number of patients in attendance: 11     Referred by: [facility]     Target symptoms: anxiety, depression, trauma     Patient's response to treatment: Active Listening & Frequent Questions     Progress toward goals: Progressing adequately     Interval History: Session focus was Distress Tolerance: Self-Soothe.  Patients were encouraged to use crisis survival skills to reduce intensity of distress.  Each patient identified something soothing for all five senses.      Diagnosis:       ICD-10-CM ICD-9-CM   1. Major Depressive Disorder, moderate F32.1 296.31   2. Generalized Anxiety Disorder F41.9 300.00   3. Post Traumatic Stress Disorder F43.10 309.89          Plan: Continue treatment on W
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
## 2023-12-11 NOTE — PROGRESS NOTES
Group Psychotherapy (PhD/LCSW)     Site: [facility]     Clinical status of patient: Intensive Outpatient Program (IOP)     Date: 12/11/2023     Group Focus: Psychodynamic Processing Group     Length of service: 35276 - 45-60 minutes     Number of patients in attendance: 7     Referred by: [facility]     Target symptoms: Anxiety, depression, and trauma      Patient's response to treatment: Active Listening     Progress toward goals: Progressing adequately     Interval History: Patient introduced self to the group and reason for joining IOP. Patient reflected on dealing with depression, and emotional distress related to previous S/A, which is impacting her functioning at high demand job. Patient remained engaged throughout the group, and provided feedback and support to other group members. Patient was receptive to encouragement provided by other group members.        Diagnosis:       ICD-10-CM ICD-9-CM   1. Major Depressive Disorder, moderate F32.1 296.31   2. Generalized Anxiety Disorder F41.9 300.00   3. Post Traumatic Stress Disorder F43.10 309.89       Plan: Continue treatment on W

## 2023-12-12 ENCOUNTER — HOSPITAL ENCOUNTER (OUTPATIENT)
Dept: PSYCHIATRY | Facility: HOSPITAL | Age: 33
Discharge: HOME | End: 2023-12-12
Attending: PSYCHIATRY & NEUROLOGY
Payer: COMMERCIAL

## 2023-12-12 DIAGNOSIS — F33.1 MAJOR DEPRESSIVE DISORDER, RECURRENT, MODERATE: Primary | ICD-10-CM

## 2023-12-12 DIAGNOSIS — F41.1 GAD (GENERALIZED ANXIETY DISORDER): ICD-10-CM

## 2023-12-12 PROCEDURE — 90853 GROUP PSYCHOTHERAPY: CPT | Mod: ,,, | Performed by: PSYCHOLOGIST

## 2023-12-12 PROCEDURE — 90853 PR GROUP PSYCHOTHERAPY: ICD-10-PCS | Mod: ,,, | Performed by: PSYCHOLOGIST

## 2023-12-12 PROCEDURE — 90853 GROUP PSYCHOTHERAPY: CPT

## 2023-12-12 NOTE — PLAN OF CARE
"   12/12/23 1430   Activity/Group Therapy Checklist   Group Other (Comments)  (Strengths Exercise)   Attendance Attended   Follows Direction Followed directions   Group Interactions/Observations Interacted appropriately;Alert;Sharing;Supportive   Affect/Mood Range Normal range   Affect/Mood Display Appropriate   Goal Progression Progressing      facilitated group session. SW discussed activity, "Looking Back, Looking Forward" and discussed the importance of acknowledging personal accomplishments and looking towards future goals.   "
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
## 2023-12-12 NOTE — PROGRESS NOTES
Group Psychotherapy (PhD/LCSW)    Site: [facility]    Clinical status of patient: Intensive Outpatient Program (IOP)    Date: 12/12/2023    Group Focus: Interpersonal Effectiveness    Length of service: 63652 - 45-50 minutes    Number of patients in attendance: 11    Referred by: [facility]     Target symptoms: Depression and Anxiety    Patient's response to treatment: Active Listening, Self-disclosure, Frequent Questions, and Feedback given to another patient    Progress toward goals: Progressing adequately    Interval History: Group session focused on introduction to Interpersonal Effectiveness and the ROSA skill for objective effectiveness.     Diagnosis:     ICD-10-CM ICD-9-CM   1. Major depressive disorder, recurrent, moderate  F33.1 296.32   2. SABI (generalized anxiety disorder)  F41.1 300.02           Plan: Continue treatment on [facility]

## 2023-12-12 NOTE — PROGRESS NOTES
Group Psychotherapy (PhD/LCSW)    Site: [facility]    Clinical status of patient: Intensive Outpatient Program (IOP)    Date: 12/11/2023    Group Focus: Sleep 101    Length of service: 09872 - 45-50 minutes    Number of patients in attendance: 11    Referred by: [facility]     Target symptoms: Depression and Anxiety    Patient's response to treatment: Active Listening, Self-disclosure, Frequent Questions, and Feedback given to another patient    Progress toward goals: Progressing adequately    Interval History: STIMULUS CONTROL and SLEEP COMPRESSION   Session focus was on stimulus control and sleep compression. Clinician and patient discussed associating beds with wakefulness and how to disrupt the connection. Clinician also discussed the use of sleep compression to improve sleep quality and stimulate sleep drive. Patients reviewed factors contributing to sleep hygiene.  Patients reviewed sleep diaries and strategies for implementing stimulus control and sleep compression.     Diagnosis:     ICD-10-CM ICD-9-CM   1. Major depressive disorder, recurrent, moderate  F33.1 296.32         Plan: Continue treatment on [facility]

## 2023-12-13 ENCOUNTER — HOSPITAL ENCOUNTER (OUTPATIENT)
Dept: PSYCHIATRY | Facility: HOSPITAL | Age: 33
Discharge: HOME | End: 2023-12-13
Attending: PSYCHIATRY & NEUROLOGY
Payer: COMMERCIAL

## 2023-12-13 VITALS
RESPIRATION RATE: 18 BRPM | TEMPERATURE: 98 F | SYSTOLIC BLOOD PRESSURE: 129 MMHG | HEART RATE: 86 BPM | DIASTOLIC BLOOD PRESSURE: 71 MMHG

## 2023-12-13 DIAGNOSIS — F43.10 PTSD (POST-TRAUMATIC STRESS DISORDER): ICD-10-CM

## 2023-12-13 DIAGNOSIS — F33.1 MDD (MAJOR DEPRESSIVE DISORDER), RECURRENT EPISODE, MODERATE: Primary | ICD-10-CM

## 2023-12-13 DIAGNOSIS — F41.1 GAD (GENERALIZED ANXIETY DISORDER): ICD-10-CM

## 2023-12-13 PROCEDURE — 90792 PR PSYCHIATRIC DIAGNOSTIC EVALUATION W/MEDICAL SERVICES: ICD-10-PCS | Mod: ,,, | Performed by: PSYCHIATRY & NEUROLOGY

## 2023-12-13 PROCEDURE — 90853 GROUP PSYCHOTHERAPY: CPT

## 2023-12-13 PROCEDURE — 90853 PR GROUP PSYCHOTHERAPY: ICD-10-PCS | Mod: ,,, | Performed by: PSYCHOLOGIST

## 2023-12-13 PROCEDURE — 90792 PSYCH DIAG EVAL W/MED SRVCS: CPT | Mod: ,,, | Performed by: PSYCHIATRY & NEUROLOGY

## 2023-12-13 PROCEDURE — 90853 GROUP PSYCHOTHERAPY: CPT | Mod: ,,, | Performed by: PSYCHOLOGIST

## 2023-12-13 NOTE — PROGRESS NOTES
Group Psychotherapy (PhD/LCSW)     Site: [facility]     Clinical status of patient: Intensive Outpatient Program (IOP)     Date: 12/13/2023     Group Focus: Mindfulness     Length of service: 89190 - 45-50 minutes     Number of patients in attendance: 9     Referred by: [facility]      Target symptoms: Depression and Anxiety     Patient's response to treatment: Active Listening, Self-disclosure, Frequent Questions, and Feedback given to another patient     Progress toward goals: Progressing adequately     Interval History: Session focus was Mindfulness: Mindfulness 'How' Skills. Patient's were introduced to mindfulness 'how' skills of non-judgmentally, one-mindfulness, and effectiveness. Patient's identified the value of each skill, how to use each skill, and practiced the use of each skill in session.     Diagnosis:       ICD-10-CM ICD-9-CM   1. Major depressive disorder, recurrent, moderate  F33.1 296.32   2. SABI (generalized anxiety disorder)  F41.1 300.02        Plan: Continue treatment on W

## 2023-12-13 NOTE — PROGRESS NOTES
"[facility] PROGRESS NOTE    PATIENT NAME: Aby Correa  MRN: [id number]  ENCOUNTER DATE:  2023 10:10 AM   [place]  [facility] [place] [facility] [place]  ADMIT DATE: 23  Pt Name: Aby Correa   : 1990   MRN: [id number]      HISTORY OF PRESENTING ILLNESS:  Aby Correa is a 33 y.o. female with history of MDD, SABI, PTSD who is admitted to BMU for worsening depression and PTSD sx.     Today,  Pt reports feeling a need to change medications but is making strides in curtailing her substance use. Still hesitant about starting an SSRI but says "maybe at this point, I should be open to anything, because something's gotta change." No history of liver/kidney problems although the patient has not seen a PCP in 1-1.5 years. Denies feeling anxious due to not being at work. Getting 10 hours of sleep at night. Exercised last night so wasn't hungry, then smoked cannabis later at night than usual. Attempting to cut back alcohol consumption (3 glasses two nights ago, 2 glasses last night). She is gradually weaning herself off marijuana use and feels she is making progress. Incorporating more exercise, including steam room treatments, to return to previous level of activity. Feels her current med regimen is "necessary" but would eventually like to get off the Klonopin; however, has expressed that when she does not take her medication, she gets GI upset. Has applied for other jobs in the past year, which she believes will help resolve some of her anxiety and somatic symptoms.      Risk Parameters:  Patient reports no suicidal ideation  Patient reports no homicidal ideation  Patient reports no self-injurious behavior  Patient reports no violent behavior    Current psych meds  Elavil 25mg qhs  Klonopin 0.5mg BID PRN anxiety (takes once every night and occasionally takes extra one during the day if having increased anxiety or panic attack)    Medication side effects:  None    Current Substance use  Alcohol : " down to 2 glasses of wine/night  Nicotine:  ~1 cigarette a day  Illicit's:  cannabis use - gradually tapering  Other Rx controlled substances (Ex-opiates, stimulants etc): None      PAST PSYCHIATRIC, MEDICAL, AND SOCIAL HISTORY REVIEWED  The patient's past medical, family and social history have been reviewed and updated as appropriate within the electronic medical record     MEDICAL REVIEW OF SYSTEMS  History obtained from the patient  General : NO chills or fever  Respiratory: NO cough, shortness of breath  Cardiovascular: NO chest pain, palpitations or racing heart  Gastrointestinal: NO nausea, vomiting, constipation or diarrhea  Neurological: NO confusion, dizziness, headaches or tremors  Psychiatric: please see HPI     ALL MEDICATIONS:    Current Outpatient Medications:     albuterol (VENTOLIN HFA) 90 mcg/actuation inhaler, Inhale 2 puffs into the lungs every 6 (six) hours as needed for Wheezing. Rescue, Disp: 18 g, Rfl: 0    amitriptyline (ELAVIL) 25 MG tablet, Take 1 tablet (25 mg total) by mouth every evening., Disp: 90 tablet, Rfl: 1    clonazePAM (KLONOPIN) 0.5 MG tablet, Take 1 tablet (0.5 mg total) by mouth 2 (two) times daily as needed for Anxiety., Disp: 60 tablet, Rfl: 3    FLUZONE QUAD 7164-1193, PF, 60 mcg (15 mcg x 4)/0.5 mL Syrg, , Disp: , Rfl:     PFIZER COVID BIVAL,12Y UP,,PF, 30 mcg/0.3 mL injection, , Disp: , Rfl:     Current Facility-Administered Medications:     levonorgestreL (MIRENA) 20 mcg/24 hours (7 yrs) 52 mg IUD 1 Intra Uterine Device, 1 Intra Uterine Device, Intrauterine, , Kp Jorgensen Jr., MD, 1 Intra Uterine Device at 11/18/21 1530    ALLERGIES:  Review of patient's allergies indicates:  No Known Allergies    RELEVANT LABS/STUDIES:    Lab Results   Component Value Date    WBC 5.63 12/01/2021    HGB 12.4 12/01/2021    HCT 38.2 12/01/2021     (H) 12/01/2021     12/01/2021     BMP  Lab Results   Component Value Date     12/01/2021    K 5.2 (H) 12/01/2021    CL  "107 12/01/2021    CO2 22 (L) 12/01/2021    BUN 18 12/01/2021    CREATININE 0.7 12/01/2021    CALCIUM 9.2 12/01/2021    ANIONGAP 12 12/01/2021    ESTGFRAFRICA >60.0 12/01/2021    EGFRNONAA >60.0 12/01/2021     Lab Results   Component Value Date    ALT 14 12/01/2021    AST 15 12/01/2021    ALKPHOS 29 (L) 12/01/2021    BILITOT 0.4 12/01/2021     Lab Results   Component Value Date    TSH 1.174 12/01/2021     No results found for: "LABA1C", "HGBA1C"    VITALS  defer to nursing note    PHYSICAL EXAM  General: well developed, well nourished  Neurologic:   Gait: Normal   Psychomotor signs:  No involuntary movements or tremor  AIMS: none    PSYCHIATRIC EXAM:     Mental Status Exam:  Arousal: alert  Sensorium/Orientation: oriented to grossly intact  Behavior/Cooperation: normal, cooperative   Speech: normal tone, normal rate, normal pitch, normal volume  Language: grossly intact  Mood: " fine, still a little hard to get up in the morning "   Affect: appropriate  Thought Process: normal and logical  Thought Content:   Auditory hallucinations: NO  Visual hallucinations: NO  Paranoia: NO  Delusions:  NO  Suicidal ideation: NO  Homicidal ideation: NO  Attention/Concentration:  intact  Memory:    Recent:  Intact   Remote: Intact  Fund of Knowledge: Aware of current events   Abstract reasoning: proverbs were abstract  Insight: intact  Judgment: behavior is adequate to circumstances       IMPRESSION:    Aby Correa is a 33 y.o. female with history of MDD, SABI, PTSD who is admitted to BMU for worsening depression and PTSD sx.     Status/Progress:  Based on the examination today, the patient's problem(s) is/are adequately but not ideally controlled (improved when not working)  New problems have not been presented today.     DIAGNOSES:  MDD, recurrent episode, moderate  Unspecified anxiety  Hx of PTSD  Cannabis use, r/o disorder  Alcohol use, r/o disorder    PLAN:    1) Continue BMU program with group and individual therapies. "     2) Psych Med:  Continue Elavil 25mg qhs for now  Continue PRN Klonopin 0.5mg BID (takes once every night and occasionally takes extra one during the day if having increased anxiety or panic attack) for now  Per pt, current med regimen helps with somatic GI sx; concerns for chronic cannabis use contributing to GI sx)  Will hold off on starting new medication for depressive sx, anxiety for now (pt hesitant to do so, concerns for underlying substance use disorder exacerbating sx)  Counseled pt on substance use; pt endorsed willingness to discontinue cannabis use and decrease alcohol use while in BMU    Discussed with patient informed consent, risks vs. benefits, alternative treatments, side effect profile and the inherent unpredictability of individual responses to these treatments. Answered any questions patient may have had. The patient expresses understanding of the above and displays the capacity to agree with this current plan     3) Other: Labs/medical problems/ collateral- as indicated        Juilane Sutton MD  [employer] PGY-IV    Sree Tierney, MS4    12/13/2023 10:10 AM

## 2023-12-13 NOTE — TREATMENT PLAN
"[facility]  MENTAL WELLNESS PROGRAM  INTERDISCIPLINARY TREATMENT PLAN  INTENSIVE OUTPATIENT     INTERDISCIPLINARY  TREATMENT TEAM:    Shon Vanegas MD Psychiatrist      Lorna Mcallister, Ph.D., Clinical Psychologist    Katie Osullivan, EMELINASW, Licensed Master Social Worker    SUGEY Marques, Registered     David Newell LPN, Licensed Practical Nurse      Resident: Fely Sutton MD    (Signatures scanned into record separately).      ESTIMATED LOS:  2 weeks      The patient has reviewed the treatment plan with staff and has signed the "Patient Responsibilities" form.    (Patient signature scanned into record separately).       TREATMENT PLAN    DIAGNOSIS:  MDD, recurrent episode, moderate  Unspecified anxiety  Hx of PTSD  Cannabis use, r/o disorder  Alcohol use, r/o disorder    Patient Education Needs/Barriers to Learning (i.e., Language, Reading, Comprehension): n/a        Support/Advocacy Services/Needs (i.e., Financial, Transportation, Medications): n/a        Community Resources (i.e., Alcoholics Anonymous, Al Anon): n/a  Patients Identified Goals:  Less/healthier alcohol consumption  2.   Less depressed, more hopeful  3.   Healthier routine & coping strategies      Coping Skills:  Avoidance/compartmentalize   2.   Alcohol use  3.   Marijuana   4.  Sleep        Strengths:  Smart  2.   Creative  3.   Kind and caring  4.   Funny      Limitations:  Deep seated trauma/PTSD  2.   Thoughts I am not safe/loved/worthy  3.   Dependence on alcohol/marijuana/etc.  4.   Job      Goals and Objectives:  1. Goal: Attend and participate in all groups   Objective measure: Progress notes indicating active listening,    self-disclosure, feedback   Time frame to reach goal: Each day    2. Goal: Medication management   Objective measure: Physician progress note indicating improved medication status   Time frame to reach goal: By discharge    3. Goal: Reduce depression   Objective measure: " Physician progress note indicating depression is improved   Time frame to reach goal: By discharge    4.  Goal: Reduce anxiety   Objective measure: Physician progress note indicating anxiety is improved   Time frame to reach goal: By discharge    5. Goal: Develop stress coping skills   Objective measure: Patient self-report of improved coping   Time frame to reach goal: By discharge    6.  Goal: Address health problems   Objective measure: Physician progress note regarding management of health problems   Time frame to reach goal: Within first week of treatment    7. Goal: Assess level of pain   Objective measure: Physician progress note regarding patient's self-reported pain   Time frame to reach goal: Within first week of treatment      Group Interventions:    Psychodynamic Group Psychotherapy - 1 hour, 5 times per week  Goals: 1. Utilize group empathy and support for problem solving; 2. Apply stress management, communication, and assertiveness skills to personal issues; 3. Discuss mental health symptoms and explore strategies for coping; 4. Discuss ways to change lifestyle to maintain better emotional health.    Communication Skills - 1 hour, 2 times per week  Goals: 1. Learn rules of effective communication; 2. Improve listening skills; 3. Practice clear communication.    Nutrition and Health - 1 hour, 1 time per week  Goals: 1. Explore impact that nutrition has on health; 2. Identify positive nutritional choices; 3. Explore how nutrition relates to stress tolerance.    Personal Growth - 1 hour, 2 times per week  Goals: 1. Discuss the development of self; 2. Create personal awareness and insight; 3. Explore a variety of psycho-educational techniques.    Promoting Healthy Lifestyles - 1 hour, 1 time per week  Goals: 1. Understand the Biopsychosocial Model of Health; 2. Develop insight into how mental health can impact other dimensions of health; 3. Develop appropriate health promotion strategies.    Acceptance and  Commitment Therapy (ACT)- 1 hour, 1 time per week  Goals: 1. Learn an action-oriented psychotherapy called ACT; 2. Focus on identifying, challenging, and clarifying values systems and beliefs; 3. Explore how values oriented actions can lead to emotional well-being.    Relationship Dynamics - 1 hour, 1 time per week  Goals: 1. Learn about factors that shape relationships; 2. Understand the central role of relationships in personal well-being; 3. Learn how to improve all relationships.    Relaxation Training - 1 hour, 3 times per week  Goals: 1. Learn about and implement various techniques for releasing physical tension from the body.    Spirituality - 1 hour, 1 time per week  Goals: 1. Discuss and reflect on the process of seeking peace and comfort; 2. Identify healthy ways of exploring spirituality.    Stress Management - 1 hour, 4 times per week  Goals: 1. Identify types and levels of stress; 2. Identify and change maladaptive beliefs and behaviors; 3. Identify and practice techniques of stress management.    DBT- 1 hour, 4 times per week  Goals: 1. Discuss emotion regulation and Interpersonal Effectiveness Skills and practice mindfulness; 2. Identify and change maladaptive beliefs and behaviors; Identify and practice techniques of DBT and mindfulness.

## 2023-12-13 NOTE — MEDICAL/APP STUDENT
"[facility] PROGRESS NOTE    PATIENT NAME: Aby Correa  MRN: [id number]  ENCOUNTER DATE:  2023 10:10 AM   SITE:  [facility] [place] [facility]  ADMIT DATE:   Pt Name: Aby Correa   : 1990   MRN: [id number]      HISTORY OF PRESENTING ILLNESS:  Aby Correa is a 33 y.o. female with history of MDD, SABI, PTSD who is admitted to BMU for worsening depression and PTSD sx.     Today,  Pt reports feeling a need to change medications but is making strides in curtailing her substance use. Still hesitant about starting an SSRI but says "maybe at this point, I should be open to anything, because something's gotta change." No history of liver problems although the patient has not seen a PCP in 1-1.5 years. Denies feeling anxious due to not being at work. Getting 10 hours of sleep at night. Exercised last night so wasn't hungry, then smoked later at night than usual. Attempting to cut back alcohol consumption (3 glasses two nights ago, 2 glasses last night). She is gradually weaning herself off marijuana use and feels she is making progress. Incorporating more exercise, including steam room treatments, to return to previous level of activity. Feels her current med regimen is "necessary" but would eventually like to get off the Klonopin; however, has expressed that when she does not take her medication, she gets GI upset. Has applied for other jobs in the past year, which she believes will help resolve some of her anxiety and somatic symptoms.      Risk Parameters:  Patient reports no suicidal ideation  Patient reports no homicidal ideation  Patient reports no self-injurious behavior  Patient reports no violent behavior    Current psych meds  Elavil 25mg qhs  Klonopin 0.5mg BID PRN anxiety (takes once every night and occasionally takes extra one during the day if having increased anxiety or panic attack)    Medication side effects:  None    Current Substance use  Alcohol : down to 2 glasses of " wine/night  Nicotine:  ~1 cigarette a day  Illicit's:  cannabis use - gradually tapering  Other Rx controlled substances (Ex-opiates, stimulants etc): None      PAST PSYCHIATRIC, MEDICAL, AND SOCIAL HISTORY REVIEWED  The patient's past medical, family and social history have been reviewed and updated as appropriate within the electronic medical record     MEDICAL REVIEW OF SYSTEMS  History obtained from the patient  General : NO chills or fever  Respiratory: NO cough, shortness of breath  Cardiovascular: NO chest pain, palpitations or racing heart  Gastrointestinal: NO nausea, vomiting, constipation or diarrhea  Neurological: NO confusion, dizziness, headaches or tremors  Psychiatric: please see HPI     ALL MEDICATIONS:    Current Outpatient Medications:     albuterol (VENTOLIN HFA) 90 mcg/actuation inhaler, Inhale 2 puffs into the lungs every 6 (six) hours as needed for Wheezing. Rescue, Disp: 18 g, Rfl: 0    amitriptyline (ELAVIL) 25 MG tablet, Take 1 tablet (25 mg total) by mouth every evening., Disp: 90 tablet, Rfl: 1    clonazePAM (KLONOPIN) 0.5 MG tablet, Take 1 tablet (0.5 mg total) by mouth 2 (two) times daily as needed for Anxiety., Disp: 60 tablet, Rfl: 3    FLUZONE QUAD 2642-0012, PF, 60 mcg (15 mcg x 4)/0.5 mL Syrg, , Disp: , Rfl:     PFIZER COVID BIVAL,12Y UP,,PF, 30 mcg/0.3 mL injection, , Disp: , Rfl:     Current Facility-Administered Medications:     levonorgestreL (MIRENA) 20 mcg/24 hours (7 yrs) 52 mg IUD 1 Intra Uterine Device, 1 Intra Uterine Device, Intrauterine, , Kp Jorgensen Jr., MD, 1 Intra Uterine Device at 11/18/21 1530    ALLERGIES:  Review of patient's allergies indicates:  No Known Allergies    RELEVANT LABS/STUDIES:    Lab Results   Component Value Date    WBC 5.63 12/01/2021    HGB 12.4 12/01/2021    HCT 38.2 12/01/2021     (H) 12/01/2021     12/01/2021     BMP  Lab Results   Component Value Date     12/01/2021    K 5.2 (H) 12/01/2021     12/01/2021     "CO2 22 (L) 12/01/2021    BUN 18 12/01/2021    CREATININE 0.7 12/01/2021    CALCIUM 9.2 12/01/2021    ANIONGAP 12 12/01/2021    ESTGFRAFRICA >60.0 12/01/2021    EGFRNONAA >60.0 12/01/2021     Lab Results   Component Value Date    ALT 14 12/01/2021    AST 15 12/01/2021    ALKPHOS 29 (L) 12/01/2021    BILITOT 0.4 12/01/2021     Lab Results   Component Value Date    TSH 1.174 12/01/2021     No results found for: "LABA1C", "HGBA1C"    VITALS  defer to nursing note    PHYSICAL EXAM  General: well developed, well nourished  Neurologic:   Gait: Normal   Psychomotor signs:  No involuntary movements or tremor  AIMS: none    PSYCHIATRIC EXAM:     Mental Status Exam:  Arousal: alert  Sensorium/Orientation: oriented to grossly intact  Behavior/Cooperation: normal, cooperative   Speech: normal tone, normal rate, normal pitch, normal volume  Language: grossly intact  Mood: " fine, still a little hard to get up in the morning "   Affect: appropriate  Thought Process: normal and logical  Thought Content:   Auditory hallucinations: NO  Visual hallucinations: NO  Paranoia: NO  Delusions:  NO  Suicidal ideation: NO  Homicidal ideation: NO  Attention/Concentration:  intact  Memory:    Recent:  Intact   Remote: Intact  Fund of Knowledge: Aware of current events   Abstract reasoning: proverbs were abstract  Insight: intact  Judgment: behavior is adequate to circumstances       IMPRESSION:    Aby Correa is a 33 y.o. female with history of MDD, SABI, PTSD who is admitted to BMU for worsening depression and PTSD sx.     Status/Progress:  Based on the examination today, the patient's problem(s) is/are adequately but not ideally controlled.  New problems have not been presented today.     DIAGNOSES:  MDD, recurrent episode, moderate  Unspecified anxiety  Hx of PTSD  Cannabis use, r/o disorder  Alcohol use, r/o disorder    PLAN:    1) Continue BMU program with group and individual therapies.     2) Psych Med:  Continue Elavil 25mg qhs for " now  Continue PRN Klonopin 0.5mg BID (takes once every night and occasionally takes extra one during the day if having increased anxiety or panic attack) for now  Per pt, current med regimen helps with somatic GI sx; concerns for chronic cannabis use contributing to GI sx)  Will hold off on starting SSRI/SNRI for now (pt hesitant to do so, concerns for underlying substance use disorder exacerbating sx)  Counseled pt on substance use; pt endorsed willingness to discontinue cannabis use and decrease alcohol use while in BMU    Discussed with patient informed consent, risks vs. benefits, alternative treatments, side effect profile and the inherent unpredictability of individual responses to these treatments. Answered any questions patient may have had. The patient expresses understanding of the above and displays the capacity to agree with this current plan     3) Other: Labs/medical problems/ collateral- none needed        MD Sree Caal, MS4    12/13/2023 10:10 AM

## 2023-12-13 NOTE — PROGRESS NOTES
Group Psychotherapy (PhD/LCSW)     Site: [facility]     Clinical status of patient: Intensive Outpatient Program (IOP)     Date: 12/12/2023     Group Focus: Psychodynamic Processing Group     Length of service: 08110 - 45-60 minutes     Number of patients in attendance: 7     Referred by: [facility]     Target symptoms: Anxiety, depression, and trauma      Patient's response to treatment: Active Listening     Progress toward goals: Progressing adequately     Interval History: Patient shared current challenges with substance use as current coping mechanism. She expressed looking forward to learning more helpful skills and her substance use. Patient remained engaged, and provided words of encouragement to a graduating group member.        Diagnosis:       ICD-10-CM ICD-9-CM   1. Major Depressive Disorder, moderate F32.1 296.31   2. Generalized Anxiety Disorder F41.9 300.00   3. Post Traumatic Stress Disorder F43.10 309.89       Plan: Continue treatment on [facility]

## 2023-12-13 NOTE — PLAN OF CARE
12/13/23 1406   Activity/Group Therapy Checklist   Group Activity   Attendance Attended   Follows Direction Followed directions   Group Interactions/Observations Interacted appropriately;Alert;Sharing;Supportive   Affect/Mood Range Normal range   Affect/Mood Display Appropriate   Goal Progression Progressing

## 2023-12-14 ENCOUNTER — HOSPITAL ENCOUNTER (OUTPATIENT)
Dept: PSYCHIATRY | Facility: HOSPITAL | Age: 33
Discharge: HOME | End: 2023-12-14
Payer: COMMERCIAL

## 2023-12-14 DIAGNOSIS — F43.10 PTSD (POST-TRAUMATIC STRESS DISORDER): ICD-10-CM

## 2023-12-14 DIAGNOSIS — F41.1 GAD (GENERALIZED ANXIETY DISORDER): Primary | ICD-10-CM

## 2023-12-14 PROCEDURE — 90853 PR GROUP PSYCHOTHERAPY: ICD-10-PCS | Mod: ,,, | Performed by: PSYCHOLOGIST

## 2023-12-14 PROCEDURE — 90853 GROUP PSYCHOTHERAPY: CPT

## 2023-12-14 PROCEDURE — 90853 GROUP PSYCHOTHERAPY: CPT | Mod: ,,, | Performed by: PSYCHOLOGIST

## 2023-12-14 RX ORDER — DULOXETIN HYDROCHLORIDE 30 MG/1
30 CAPSULE, DELAYED RELEASE ORAL DAILY
Qty: 30 CAPSULE | Refills: 1 | Status: SHIPPED | OUTPATIENT
Start: 2023-12-14 | End: 2023-12-20
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
## 2023-12-14 NOTE — PLAN OF CARE
12/14/23 1411   Activity/Group Therapy Checklist   Group Other (Comments)  (Creative Session)   Attendance Attended   Follows Direction Followed directions   Group Interactions/Observations Interacted appropriately;Alert;Sharing;Supportive   Affect/Mood Range Normal range   Affect/Mood Display Appropriate   Goal Progression Progressing      facilitated creative group session with patients.  SW discused the concept of building happiness with the Heart Map activity.  SW asked pts to identify people places, things and others that contributed to their happines and lived deep inside of their heart and to be descriptive as possible. This exercise allowed pts to reflect on siginificant memories small and big and ways to consider how to sustain happiness.

## 2023-12-14 NOTE — PROGRESS NOTES
Group Psychotherapy (PhD/LCSW)     Site: [facility]     Clinical status of patient: Intensive Outpatient Program (IOP)     Date: 12/13/2023     Group Focus: Psychodynamic Processing Group     Length of service: 98627 - 45-60 minutes     Number of patients in attendance: 6     Referred by: [facility]     Target symptoms: Anxiety, depression, and trauma      Patient's response to treatment: Active Listening     Progress toward goals: Progressing adequately     Interval History: Patient reflected on improving her ability to communicate her needs to others. She shared examples of specific ways her family and friends could provide support. Patient remained engaged and provided emotional support to a fellow group member, and shared words of encouragement with a graduating group member.       Diagnosis:       ICD-10-CM ICD-9-CM   1. Major Depressive Disorder, moderate F32.1 296.31   2. Generalized Anxiety Disorder F41.9 300.00   3. Post Traumatic Stress Disorder F43.10 309.89       Plan: Continue treatment on W

## 2023-12-14 NOTE — PROGRESS NOTES
Group Psychotherapy (PhD/LCSW)     Site: [facility]     Clinical status of patient: Intensive Outpatient Program (IOP)     Date: 12/14/2023     Group Focus: Emotion Regulation     Length of service: 92025 - 45-50 minutes     Number of patients in attendance: 10     Referred by: [facility]      Target symptoms: Depression and Anxiety     Patient's response to treatment: Active Listening, Self-disclosure, Frequent Questions, & Feedback given to another patient     Progress toward goals: Progressing adequately     Interval History: Session focus was Emotion Regulation:  Opposite Action.  Patients identified action urges for each emotion and learned how to engage in opposite action when the emotions are not justified or unhelpful.     Diagnosis:       ICD-10-CM ICD-9-CM   1. Major depressive disorder, recurrent, moderate  F33.1 296.32   2. SABI (generalized anxiety disorder)  F41.1 300.02         Plan: Continue treatment on [facility]

## 2023-12-15 ENCOUNTER — HOSPITAL ENCOUNTER (OUTPATIENT)
Dept: PSYCHIATRY | Facility: HOSPITAL | Age: 33
Discharge: HOME | End: 2023-12-15
Attending: PSYCHIATRY & NEUROLOGY
Payer: COMMERCIAL

## 2023-12-15 VITALS
SYSTOLIC BLOOD PRESSURE: 139 MMHG | RESPIRATION RATE: 18 BRPM | DIASTOLIC BLOOD PRESSURE: 79 MMHG | HEART RATE: 83 BPM | TEMPERATURE: 98 F

## 2023-12-15 DIAGNOSIS — F41.1 GAD (GENERALIZED ANXIETY DISORDER): ICD-10-CM

## 2023-12-15 DIAGNOSIS — F10.10 ALCOHOL ABUSE: Primary | Chronic | ICD-10-CM

## 2023-12-15 DIAGNOSIS — F33.1 MDD (MAJOR DEPRESSIVE DISORDER), RECURRENT EPISODE, MODERATE: ICD-10-CM

## 2023-12-15 DIAGNOSIS — F43.10 PTSD (POST-TRAUMATIC STRESS DISORDER): ICD-10-CM

## 2023-12-15 DIAGNOSIS — F12.20 CANNABIS DEPENDENCE, UNCOMPLICATED: ICD-10-CM

## 2023-12-15 PROCEDURE — 90853 GROUP PSYCHOTHERAPY: CPT

## 2023-12-15 PROCEDURE — 99232 PR SUBSEQUENT HOSPITAL CARE,LEVL II: ICD-10-PCS | Mod: ,,, | Performed by: PSYCHIATRY & NEUROLOGY

## 2023-12-15 PROCEDURE — 99232 SBSQ HOSP IP/OBS MODERATE 35: CPT | Mod: ,,, | Performed by: PSYCHIATRY & NEUROLOGY

## 2023-12-15 NOTE — PROGRESS NOTES
Group Psychotherapy (PhD/LCSW)     Site: [facility]     Clinical status of patient: Intensive Outpatient Program (IOP)     Date: 12/14/2023     Group Focus: Psychodynamic Processing Group     Length of service: 55409 - 45-60 minutes     Number of patients in attendance: 6     [facility]     Target symptoms: Anxiety, depression, and trauma      Patient's response to treatment: Active Listening     Progress toward goals: Progressing adequately     Interval History: Patient reflected on past relationship and expressed feelings of hopeless of having positive interactions with men. Patient shared how her past experiences led to her prioritizing self care, and self exploration. Patient remained engaged, provided emotional support to a fellow group member, and shared words of encouragement with a graduating group member.       Diagnosis:       ICD-10-CM ICD-9-CM   1. Major Depressive Disorder, moderate F32.1 296.31   2. Generalized Anxiety Disorder F41.9 300.00   3. Post Traumatic Stress Disorder F43.10 309.89       Plan: Continue treatment on [facility]

## 2023-12-15 NOTE — PROGRESS NOTES
Group Psychotherapy (PhD/LCSW)     Site: [facility]     Clinical status of patient: Intensive Outpatient Program (IOP)     Date: 12/15/2023     Group Focus: Psychodynamic Processing Group     Length of service: 22890 - 45-60 minutes     Number of patients in attendance: 4     [facility]     Target symptoms: Anxiety, depression, and trauma      Patient's response to treatment: Active Listening     Progress toward goals: Progressing adequately     Interval History: Patient reported she was feeling very anxious throughout the day. Patient solicited from group other means of coping with her distress. Patient also disclosed that her inability to identify the root cause of her anxiety was a contributing factor. Clinician and group challenged the patient to focus on using distress tolerance skills such as self-soothing, and distract with accepts as alternate skills compared to substance use.     Diagnosis:       ICD-10-CM ICD-9-CM   1. Major Depressive Disorder, moderate F32.1 296.31   2. Generalized Anxiety Disorder F41.9 300.00   3. Post Traumatic Stress Disorder F43.10 309.89       Plan: Continue treatment on W

## 2023-12-15 NOTE — PROGRESS NOTES
[facility] PROGRESS NOTE    PATIENT NAME: Aby Correa  MRN: [id number]  ENCOUNTER DATE:  12/15/2023 10:10 AM   SITE[place]  [facility] [place] [facility]  ADMIT DATE: 23  Pt Name: Aby Correa   : 1990   MRN: [id number]      HISTORY OF PRESENTING ILLNESS:  Aby Correa is a 33 y.o. female with history of MDD, SABI, PTSD who is admitted to BMU for worsening depression and PTSD sx.     Today,  Picked up Cymbalta and took first dose last night; no issues so far.   Mood is neutral today. Less anxiety currently while she's not working, but does occasionally feel more anxious when she over-thinks or overanalyzes certain things. Sleep and appetite fairly stable. Continues to try and cut down on alcohol and cannabis use.   Denies any physical complaints, including GI sx. Ideally would like to eventually taper off Klonopin in the future.   Would like to continue current med regimen       Risk Parameters:  Patient reports no suicidal ideation  Patient reports no homicidal ideation  Patient reports no self-injurious behavior  Patient reports no violent behavior    Current psych meds  Cymbalta 30mg daily(started 23)  Elavil 25mg qhs  Klonopin 0.5mg BID PRN anxiety (takes once every night and occasionally takes extra one during the day if having increased anxiety or panic attack)    Medication side effects:  None    Current Substance use  Alcohol : down to 2 glasses of wine/night  Nicotine:  ~1 cigarette a day  Illicit's:  cannabis use - gradually tapering  Other Rx controlled substances (Ex-opiates, stimulants etc): None      PAST PSYCHIATRIC, MEDICAL, AND SOCIAL HISTORY REVIEWED  The patient's past medical, family and social history have been reviewed and updated as appropriate within the electronic medical record     MEDICAL REVIEW OF SYSTEMS  History obtained from the patient  General : NO chills or fever  Respiratory: NO cough, shortness of breath  Cardiovascular: NO chest pain,  palpitations or racing heart  Gastrointestinal: NO nausea, vomiting, constipation or diarrhea  Neurological: NO confusion, dizziness, headaches or tremors  Psychiatric: please see HPI     ALL MEDICATIONS:    Current Outpatient Medications:     albuterol (VENTOLIN HFA) 90 mcg/actuation inhaler, Inhale 2 puffs into the lungs every 6 (six) hours as needed for Wheezing. Rescue, Disp: 18 g, Rfl: 0    amitriptyline (ELAVIL) 25 MG tablet, Take 1 tablet (25 mg total) by mouth every evening., Disp: 90 tablet, Rfl: 1    clonazePAM (KLONOPIN) 0.5 MG tablet, Take 1 tablet (0.5 mg total) by mouth 2 (two) times daily as needed for Anxiety., Disp: 60 tablet, Rfl: 3    DULoxetine (CYMBALTA) 30 MG capsule, Take 1 capsule (30 mg total) by mouth once daily., Disp: 30 capsule, Rfl: 1    FLUZONE QUAD 9410-3056, PF, 60 mcg (15 mcg x 4)/0.5 mL Syrg, , Disp: , Rfl:     PFIZER COVID BIVAL,12Y UP,,PF, 30 mcg/0.3 mL injection, , Disp: , Rfl:     Current Facility-Administered Medications:     levonorgestreL (MIRENA) 20 mcg/24 hours (7 yrs) 52 mg IUD 1 Intra Uterine Device, 1 Intra Uterine Device, Intrauterine, , Kp Jorgensen Jr., MD, 1 Intra Uterine Device at 11/18/21 1530    ALLERGIES:  Review of patient's allergies indicates:  No Known Allergies    RELEVANT LABS/STUDIES:    Lab Results   Component Value Date    WBC 5.63 12/01/2021    HGB 12.4 12/01/2021    HCT 38.2 12/01/2021     (H) 12/01/2021     12/01/2021     BMP  Lab Results   Component Value Date     12/01/2021    K 5.2 (H) 12/01/2021     12/01/2021    CO2 22 (L) 12/01/2021    BUN 18 12/01/2021    CREATININE 0.7 12/01/2021    CALCIUM 9.2 12/01/2021    ANIONGAP 12 12/01/2021    ESTGFRAFRICA >60.0 12/01/2021    EGFRNONAA >60.0 12/01/2021     Lab Results   Component Value Date    ALT 14 12/01/2021    AST 15 12/01/2021    ALKPHOS 29 (L) 12/01/2021    BILITOT 0.4 12/01/2021     Lab Results   Component Value Date    TSH 1.174 12/01/2021     No results found for:  ""LABA1C", "HGBA1C"    VITALS  defer to nursing note    PHYSICAL EXAM  General: well developed, well nourished  Neurologic:   Gait: Normal   Psychomotor signs:  No involuntary movements or tremor  AIMS: none    PSYCHIATRIC EXAM:     Mental Status Exam:  Arousal: alert  Sensorium/Orientation: oriented to grossly intact  Behavior/Cooperation: normal, cooperative   Speech: normal tone, normal rate, normal pitch, normal volume  Language: grossly intact  Mood: " fine, still a little hard to get up in the morning "   Affect: appropriate  Thought Process: normal and logical  Thought Content:   Auditory hallucinations: NO  Visual hallucinations: NO  Paranoia: NO  Delusions:  NO  Suicidal ideation: NO  Homicidal ideation: NO  Attention/Concentration:  intact  Memory:    Recent:  Intact   Remote: Intact  Fund of Knowledge: Aware of current events   Abstract reasoning: proverbs were abstract  Insight: intact  Judgment: behavior is adequate to circumstances       IMPRESSION:    Aby Correa is a 33 y.o. female with history of MDD, SABI, PTSD who is admitted to BMU for worsening depression and PTSD sx.     Status/Progress:  Based on the examination today, the patient's problem(s) is/are inaquately(improved when not working) controlled.  New problems have not been presented today.     DIAGNOSES:  MDD, recurrent episode, moderate  Unspecified anxiety  Hx of PTSD  Cannabis use, r/o disorder  Alcohol use, r/o disorder    PLAN:    1) Continue BMU program with group and individual therapies.     2) Psych Med:  Continue Cymbalta 30mg daily, for depressive sx and anxiety; will plan to increase to 60mg daily after 1 week if pt tolerating well.  Will also obtain CMP in near future to check liver, kidney function (no hx of issues).   Continue Elavil 25mg qhs for now  Continue PRN Klonopin 0.5mg BID (takes once every night and occasionally takes extra one during the day if having increased anxiety or panic attack) for now  Per pt, current " med regimen helps with somatic GI sx; concerns for chronic cannabis use contributing to GI sx)  Counseled pt on substance use; pt endorsed willingness to discontinue cannabis use and decrease alcohol use while in BMU  Discuss tapering off Klonopin (especially in light of concurrent alcohol use)    Discussed with patient informed consent, risks vs. benefits, alternative treatments, side effect profile and the inherent unpredictability of individual responses to these treatments. Answered any questions patient may have had. The patient expresses understanding of the above and displays the capacity to agree with this current plan     3) Other: Labs/medical problems/ collateral- as indicated        Juliane Sutton MD  [facility] PGY-IV   12/15/2023 10:10 AM

## 2023-12-15 NOTE — PLAN OF CARE
12/15/23 2608   Activity/Group Therapy Checklist   Group Meditation/Relaxation   Attendance Attended   Follows Direction Followed directions   Group Interactions/Observations Interacted appropriately;Sharing;Supportive;Alert   Affect/Mood Range Normal range   Affect/Mood Display Appropriate   Goal Progression Progressing

## 2023-12-18 ENCOUNTER — LAB VISIT (OUTPATIENT)
Dept: LAB | Facility: HOSPITAL | Age: 33
End: 2023-12-18
Payer: COMMERCIAL

## 2023-12-18 ENCOUNTER — HOSPITAL ENCOUNTER (OUTPATIENT)
Dept: PSYCHIATRY | Facility: HOSPITAL | Age: 33
Discharge: HOME | End: 2023-12-18
Payer: COMMERCIAL

## 2023-12-18 VITALS
SYSTOLIC BLOOD PRESSURE: 136 MMHG | HEART RATE: 71 BPM | DIASTOLIC BLOOD PRESSURE: 87 MMHG | TEMPERATURE: 97 F | RESPIRATION RATE: 18 BRPM

## 2023-12-18 DIAGNOSIS — F33.1 MDD (MAJOR DEPRESSIVE DISORDER), RECURRENT EPISODE, MODERATE: ICD-10-CM

## 2023-12-18 DIAGNOSIS — F41.1 GAD (GENERALIZED ANXIETY DISORDER): ICD-10-CM

## 2023-12-18 DIAGNOSIS — F41.1 GAD (GENERALIZED ANXIETY DISORDER): Primary | ICD-10-CM

## 2023-12-18 LAB
ALBUMIN SERPL BCP-MCNC: 4.7 G/DL (ref 3.5–5.2)
ALP SERPL-CCNC: 41 U/L (ref 55–135)
ALT SERPL W/O P-5'-P-CCNC: 13 U/L (ref 10–44)
ANION GAP SERPL CALC-SCNC: 9 MMOL/L (ref 8–16)
AST SERPL-CCNC: 19 U/L (ref 10–40)
BASOPHILS # BLD AUTO: 0.04 K/UL (ref 0–0.2)
BASOPHILS NFR BLD: 0.6 % (ref 0–1.9)
BILIRUB SERPL-MCNC: 0.7 MG/DL (ref 0.1–1)
BUN SERPL-MCNC: 14 MG/DL (ref 6–20)
CALCIUM SERPL-MCNC: 10.1 MG/DL (ref 8.7–10.5)
CHLORIDE SERPL-SCNC: 101 MMOL/L (ref 95–110)
CO2 SERPL-SCNC: 25 MMOL/L (ref 23–29)
CREAT SERPL-MCNC: 0.7 MG/DL (ref 0.5–1.4)
DIFFERENTIAL METHOD: ABNORMAL
EOSINOPHIL # BLD AUTO: 0.1 K/UL (ref 0–0.5)
EOSINOPHIL NFR BLD: 0.9 % (ref 0–8)
ERYTHROCYTE [DISTWIDTH] IN BLOOD BY AUTOMATED COUNT: 12.8 % (ref 11.5–14.5)
EST. GFR  (NO RACE VARIABLE): >60 ML/MIN/1.73 M^2
GLUCOSE SERPL-MCNC: 139 MG/DL (ref 70–110)
HCT VFR BLD AUTO: 50.5 % (ref 37–48.5)
HGB BLD-MCNC: 17.6 G/DL (ref 12–16)
IMM GRANULOCYTES # BLD AUTO: 0.02 K/UL (ref 0–0.04)
IMM GRANULOCYTES NFR BLD AUTO: 0.3 % (ref 0–0.5)
LYMPHOCYTES # BLD AUTO: 1.6 K/UL (ref 1–4.8)
LYMPHOCYTES NFR BLD: 24.2 % (ref 18–48)
MCH RBC QN AUTO: 33.1 PG (ref 27–31)
MCHC RBC AUTO-ENTMCNC: 34.9 G/DL (ref 32–36)
MCV RBC AUTO: 95 FL (ref 82–98)
MONOCYTES # BLD AUTO: 0.4 K/UL (ref 0.3–1)
MONOCYTES NFR BLD: 6 % (ref 4–15)
NEUTROPHILS # BLD AUTO: 4.6 K/UL (ref 1.8–7.7)
NEUTROPHILS NFR BLD: 68 % (ref 38–73)
NRBC BLD-RTO: 0 /100 WBC
PLATELET # BLD AUTO: 217 K/UL (ref 150–450)
PMV BLD AUTO: 10.6 FL (ref 9.2–12.9)
POTASSIUM SERPL-SCNC: 3.7 MMOL/L (ref 3.5–5.1)
PROT SERPL-MCNC: 8.3 G/DL (ref 6–8.4)
RBC # BLD AUTO: 5.32 M/UL (ref 4–5.4)
SODIUM SERPL-SCNC: 135 MMOL/L (ref 136–145)
WBC # BLD AUTO: 6.79 K/UL (ref 3.9–12.7)

## 2023-12-18 PROCEDURE — 85025 COMPLETE CBC W/AUTO DIFF WBC: CPT | Performed by: STUDENT IN AN ORGANIZED HEALTH CARE EDUCATION/TRAINING PROGRAM

## 2023-12-18 PROCEDURE — 80053 COMPREHEN METABOLIC PANEL: CPT | Performed by: STUDENT IN AN ORGANIZED HEALTH CARE EDUCATION/TRAINING PROGRAM

## 2023-12-18 PROCEDURE — 90853 PR GROUP PSYCHOTHERAPY: ICD-10-PCS | Mod: ,,, | Performed by: PSYCHOLOGIST

## 2023-12-18 PROCEDURE — 36415 COLL VENOUS BLD VENIPUNCTURE: CPT | Performed by: STUDENT IN AN ORGANIZED HEALTH CARE EDUCATION/TRAINING PROGRAM

## 2023-12-18 PROCEDURE — 90853 GROUP PSYCHOTHERAPY: CPT | Mod: ,,, | Performed by: PSYCHOLOGIST

## 2023-12-18 NOTE — PROGRESS NOTES
Group Psychotherapy (PhD/LCSW)     Site: [facility]     Clinical status of patient: Intensive Outpatient Program (IOP)     Date: 12/18/2023     Group Focus: Distress Tolerance     Length of service: 25460 - 45-50 minutes     Number of patients in attendance: 10     Referred by: [facility]      Target symptoms: Depression and Anxiety     Patient's response to treatment: Active Listening     Progress toward goals: Progressing adequately     Interval History: Session focus was Distress Tolerance:  IMPROVE.  Patients were encouraged to use imagery, find meaning, use prayer, relax, focus on one thing in the moment, take a brief vacation, and use self-encouragement.     Diagnosis:       ICD-10-CM ICD-9-CM   1. Major depressive disorder, recurrent, moderate  F33.1 296.32   2. SABI (generalized anxiety disorder)  F41.1 300.02         Plan: Continue treatment on W

## 2023-12-18 NOTE — PROGRESS NOTES
"[facility] PROGRESS NOTE    PATIENT NAME: Aby Correa  MRN: [id number]  ENCOUNTER DATE:  2023 10:10 AM   SITE:  [facility] [place] [facility], [facility]  ADMIT DATE: 23  Pt Name: Aby Correa   : 1990   MRN: [id number]      HISTORY OF PRESENTING ILLNESS:  Aby Correa is a 33 y.o. female with history of MDD, SABI, PTSD who is admitted to BMU for worsening depression and PTSD sx.     Today,  Doing ok overall. Didn't do much over the weekend/didn't plan anything.   Decreased appetite since Thursday. No GI sx. Has been trying to eat regularly. In the past, exercise/going to sauna has helped when her appetite was decreased.   Has been "kind of anxious, restless" especially if she's not smoking or drinking. Didn't smoke yesterday, continues to cut down on drinking.   Doing ok with Cymbalta; unsure if decreased appetite is related to Cymbalta, or to cutting down on cannabis use. Denies any other medication s/e.       Risk Parameters:  Patient reports no suicidal ideation  Patient reports no homicidal ideation  Patient reports no self-injurious behavior  Patient reports no violent behavior    Current psych meds  Cymbalta 30mg daily (started 23)  Elavil 25mg qhs  Klonopin 0.5mg BID PRN anxiety (takes once every night and occasionally takes extra one during the day if having increased anxiety or panic attack)    Medication side effects:  None    Current Substance use  Alcohol : down to 2 glasses of wine/night  Nicotine:  ~1 cigarette a day  Illicit's:  cannabis use - gradually tapering  Other Rx controlled substances (Ex-opiates, stimulants etc): None      PAST PSYCHIATRIC, MEDICAL, AND SOCIAL HISTORY REVIEWED  The patient's past medical, family and social history have been reviewed and updated as appropriate within the electronic medical record     MEDICAL REVIEW OF SYSTEMS  History obtained from the patient  General : NO chills or fever  Respiratory: NO cough, shortness of " breath  Cardiovascular: NO chest pain, palpitations or racing heart  Gastrointestinal: NO nausea, vomiting, constipation or diarrhea  Neurological: NO confusion, dizziness, headaches or tremors  Psychiatric: please see HPI     ALL MEDICATIONS:    Current Outpatient Medications:     albuterol (VENTOLIN HFA) 90 mcg/actuation inhaler, Inhale 2 puffs into the lungs every 6 (six) hours as needed for Wheezing. Rescue, Disp: 18 g, Rfl: 0    amitriptyline (ELAVIL) 25 MG tablet, Take 1 tablet (25 mg total) by mouth every evening., Disp: 90 tablet, Rfl: 1    clonazePAM (KLONOPIN) 0.5 MG tablet, Take 1 tablet (0.5 mg total) by mouth 2 (two) times daily as needed for Anxiety., Disp: 60 tablet, Rfl: 3    DULoxetine (CYMBALTA) 30 MG capsule, Take 1 capsule (30 mg total) by mouth once daily., Disp: 30 capsule, Rfl: 1    FLUZONE QUAD 9121-3987, PF, 60 mcg (15 mcg x 4)/0.5 mL Syrg, , Disp: , Rfl:     PFIZER COVID BIVAL,12Y UP,,PF, 30 mcg/0.3 mL injection, , Disp: , Rfl:     Current Facility-Administered Medications:     levonorgestreL (MIRENA) 20 mcg/24 hours (7 yrs) 52 mg IUD 1 Intra Uterine Device, 1 Intra Uterine Device, Intrauterine, , Kp Jorgensen Jr., MD, 1 Intra Uterine Device at 11/18/21 1530    ALLERGIES:  Review of patient's allergies indicates:  No Known Allergies    RELEVANT LABS/STUDIES:    Lab Results   Component Value Date    WBC 5.63 12/01/2021    HGB 12.4 12/01/2021    HCT 38.2 12/01/2021     (H) 12/01/2021     12/01/2021     BMP  Lab Results   Component Value Date     12/01/2021    K 5.2 (H) 12/01/2021     12/01/2021    CO2 22 (L) 12/01/2021    BUN 18 12/01/2021    CREATININE 0.7 12/01/2021    CALCIUM 9.2 12/01/2021    ANIONGAP 12 12/01/2021    ESTGFRAFRICA >60.0 12/01/2021    EGFRNONAA >60.0 12/01/2021     Lab Results   Component Value Date    ALT 14 12/01/2021    AST 15 12/01/2021    ALKPHOS 29 (L) 12/01/2021    BILITOT 0.4 12/01/2021     Lab Results   Component Value Date    TSH  "1.174 12/01/2021     No results found for: "LABA1C", "HGBA1C"    VITALS  defer to nursing note    PHYSICAL EXAM  General: well developed, well nourished  Neurologic:   Gait: Normal   Psychomotor signs:  No involuntary movements or tremor  AIMS: none    PSYCHIATRIC EXAM:     Mental Status Exam:  Arousal: alert  Sensorium/Orientation: oriented to grossly intact  Behavior/Cooperation: normal, cooperative   Speech: normal tone, normal rate, normal pitch, normal volume  Language: grossly intact  Mood: " ok "   Affect: appropriate, mood-congruent  Thought Process: normal and logical  Thought Content:   Auditory hallucinations: NO  Visual hallucinations: NO  Paranoia: NO  Delusions:  NO  Suicidal ideation: NO  Homicidal ideation: NO  Attention/Concentration:  intact  Memory:    Recent:  Intact   Remote: Intact  Fund of Knowledge: Aware of current events   Abstract reasoning: proverbs were abstract  Insight: intact  Judgment: behavior is adequate to circumstances       IMPRESSION:    Aby Correa is a 33 y.o. female with history of MDD, SABI, PTSD who is admitted to BMU for worsening depression and PTSD sx.     Status/Progress:  Based on the examination today, the patient's problem(s) is/are inaquately(improved when not working) controlled.  New problems have not been presented today.     DIAGNOSES:  MDD, recurrent episode, moderate  Unspecified anxiety  Hx of PTSD  Cannabis use, r/o disorder  Alcohol use, r/o disorder    PLAN:    1) Continue BMU program with group and individual therapies.     2) Psych Med:  Continue Cymbalta 30mg daily, for depressive sx and anxiety; will plan to increase to 60mg daily after 1 week if pt tolerating well.  CBC, CMP ordered  Continue Elavil 25mg qhs for now  Continue PRN Klonopin 0.5mg BID (takes once every night and occasionally takes extra one during the day if having increased anxiety or panic attack) for now; would recommend tapering off in near future given substance use hx.   Per " pt, current med regimen helps with somatic GI sx; concerns for chronic cannabis use contributing to GI sx)  Counseled pt on substance use; pt endorsed willingness to discontinue cannabis use and decrease alcohol use while in BMU  Discuss tapering off Klonopin (especially in light of concurrent alcohol use)    Discussed with patient informed consent, risks vs. benefits, alternative treatments, side effect profile and the inherent unpredictability of individual responses to these treatments. Answered any questions patient may have had. The patient expresses understanding of the above and displays the capacity to agree with this current plan     3) Other: Labs/medical problems/ collateral- as indicated        Juliane Sutton MD  [facility] PGY-IV   12/18/2023 10:10 AM

## 2023-12-19 ENCOUNTER — HOSPITAL ENCOUNTER (OUTPATIENT)
Dept: PSYCHIATRY | Facility: HOSPITAL | Age: 33
Discharge: HOME | End: 2023-12-19
Payer: COMMERCIAL

## 2023-12-19 PROCEDURE — 90853 GROUP PSYCHOTHERAPY: CPT

## 2023-12-19 NOTE — PROGRESS NOTES
Group Psychotherapy (PhD/LCSW)    Site: [facility]    Clinical status of patient: Intensive Outpatient Program (IOP)    Date: 12/18/2023    Group Focus: Sleep 101    Length of service: 98516 - 45-50 minutes    Number of patients in attendance: 10    Referred by: [facility]     Target symptoms: Depression and Anxiety    Patient's response to treatment: Active Listening, Self-disclosure, Frequent Questions, and Feedback given to another patient    Progress toward goals: Progressing adequately    Interval History: RELAXATION   Session focus was on the use and implementation of relaxation techniques to help improve sleep quality and counteract negative sleep thoughts. Patients explored the connection between arousal and sleep. Patients engaged in relaxation techniques to promote relaxation including diaphragmatic breathing, paced breathing, visualization, body scan mediations and progressive muscle relaxation. Patients reviewed their sleep diaries, made adjustments to sleep compression times, and attended to stimulus control.     Diagnosis:     ICD-10-CM ICD-9-CM   1. SABI (generalized anxiety disorder)  F41.1 300.02   2. MDD (major depressive disorder), recurrent episode, moderate  F33.1 296.32         Plan: Continue treatment on [facility]

## 2023-12-19 NOTE — PROGRESS NOTES
Group Psychotherapy (PhD/LCSW)     Site: [facility]     Clinical status of patient: Intensive Outpatient Program (IOP)     Date: 12/19/2023     Group Focus: Psychodynamic Processing Group     Length of service: 51197 - 45-60 minutes     Number of patients in attendance: 6     Referred by: [facility]     Target symptoms: Anxiety, depression, and trauma      Patient's response to treatment: Active Listening     Progress toward goals: Progressing adequately     Interval History: Patient offered feedback to assist fellow group members. Patient reflected on early memories and how her experiences impact her present self. Patient remained engaged, provided emotional support to a fellow group member, and shared words of encouragement with a graduating group member.     Diagnosis:       ICD-10-CM ICD-9-CM   1. Major Depressive Disorder, moderate F32.1 296.31   2. Generalized Anxiety Disorder F41.9 300.00   3. Post Traumatic Stress Disorder F43.10 309.89       Plan: Continue treatment on [facility]

## 2023-12-19 NOTE — PROGRESS NOTES
Group Psychotherapy (PhD/LCSW)     Site: [facility]     Clinical status of patient: Intensive Outpatient Program (IOP)     Date: 12/19/2023     Group Focus: Interpersonal Effectiveness      Length of service: 08662 - 45-50 minutes     Number of patients in attendance: 10     Referred by: [facility]      Target symptoms: Depression and Anxiety     Patient's response to treatment: Active Listening     Progress toward goals: Progressing adequately     Interval History: Session focus was Interpersonal Effectiveness:  GIVE and FAST. Patients were introduced to skills to promote active listening, self-respect, and attendance to values. Patients were provided with opportunities to collaborate with others and role-play in session.  Diagnosis:       ICD-10-CM ICD-9-CM   1. Major depressive disorder, recurrent, moderate  F33.1 296.32   2. SABI (generalized anxiety disorder)  F41.1 300.02      Plan: Continue treatment on [facility]

## 2023-12-19 NOTE — PROGRESS NOTES
Group Psychotherapy (PhD/LCSW)     Site: [facility]     Clinical status of patient: Intensive Outpatient Program (IOP)     Date: 12/18/2023     Group Focus: Psychodynamic Processing Group     Length of service: 62163 - 45-60 minutes     Number of patients in attendance: 7     [facility]     Target symptoms: Anxiety, depression, and trauma      Patient's response to treatment: Active Listening     Progress toward goals: Progressing adequately     Interval History: Patient offered feedback to assist fellow group members, and shared coping mechanisms that have been helpful. Patient remained engaged, provided emotional support to a fellow group member, and shared words of encouragement with a graduating group member.     Diagnosis:       ICD-10-CM ICD-9-CM   1. Major Depressive Disorder, moderate F32.1 296.31   2. Generalized Anxiety Disorder F41.9 300.00   3. Post Traumatic Stress Disorder F43.10 309.89       Plan: Continue treatment on [facility]

## 2023-12-19 NOTE — PLAN OF CARE
12/19/23 1442   Activity/Group Therapy Checklist   Group Other (Comments)  (Strengths Exercise)   Attendance Attended   Follows Direction Followed directions   Group Interactions/Observations Interacted appropriately;Alert;Sharing;Supportive   Affect/Mood Range Normal range   Affect/Mood Display Appropriate   Goal Progression Progressing      facilitated group and discussed Early Memories exercise with patients. SW discussed with patients that recalling and reflecting on past memories helps to better understand how you became the person you are today and shines light on individua; strengths and character traits.

## 2023-12-20 ENCOUNTER — HOSPITAL ENCOUNTER (OUTPATIENT)
Dept: PSYCHIATRY | Facility: HOSPITAL | Age: 33
Discharge: HOME | End: 2023-12-20
Payer: COMMERCIAL

## 2023-12-20 VITALS
SYSTOLIC BLOOD PRESSURE: 150 MMHG | HEART RATE: 82 BPM | DIASTOLIC BLOOD PRESSURE: 95 MMHG | TEMPERATURE: 97 F | RESPIRATION RATE: 18 BRPM

## 2023-12-20 DIAGNOSIS — F41.1 GAD (GENERALIZED ANXIETY DISORDER): Primary | ICD-10-CM

## 2023-12-20 PROCEDURE — 90853 PR GROUP PSYCHOTHERAPY: ICD-10-PCS | Mod: ,,, | Performed by: PSYCHOLOGIST

## 2023-12-20 PROCEDURE — 90853 GROUP PSYCHOTHERAPY: CPT | Performed by: SOCIAL WORKER

## 2023-12-20 PROCEDURE — 90853 GROUP PSYCHOTHERAPY: CPT | Mod: ,,, | Performed by: PSYCHOLOGIST

## 2023-12-20 RX ORDER — DULOXETIN HYDROCHLORIDE 60 MG/1
60 CAPSULE, DELAYED RELEASE ORAL DAILY
Qty: 30 CAPSULE | Refills: 2 | Status: SHIPPED | OUTPATIENT
Start: 2023-12-20 | End: 2024-01-25 | Stop reason: SDUPTHER

## 2023-12-20 NOTE — PLAN OF CARE
12/20/23 1100   Activity/Group Therapy Checklist   Group Educational   Attendance Attended   Follows Direction Followed directions   Group Interactions/Observations Interacted appropriately   Affect/Mood Range Normal range   Affect/Mood Display Appropriate   Goal Progression Progressing

## 2023-12-20 NOTE — PROGRESS NOTES
"[facility] PROGRESS NOTE    PATIENT NAME: Aby Correa  MRN: [id number]  ENCOUNTER DATE:  2023 10:10 AM   SITE:  [facility] [place] [facility], [facility]  ADMIT DATE: 23  Pt Name: Aby Correa   : 1990   MRN: [id number]      HISTORY OF PRESENTING ILLNESS:  Aby Correa is a 33 y.o. female with history of MDD, SABI, PTSD who is admitted to BMU for worsening depression and PTSD sx.     Today,    Doing ok overall. Appetite has been improving, and she's been eating a little more.   Hasn't smoked cannabis in 3 days. Two glasses of wine last night; continues to cut down on alcohol use as well.   Sleeping pretty well.   Feeling "neutral" in terms of mood.   Discussed lab results (CBC, CMP); WNL.   Continues to take Cymbalta 30mg daily; denies any s/e. Discussed increasing to 60mg daily starting tomorrow (week after starting Cymbalta 30mg daily).  Pt agreeable to plan.       Risk Parameters:  Patient reports no suicidal ideation  Patient reports no homicidal ideation  Patient reports no self-injurious behavior  Patient reports no violent behavior    Current psych meds  Cymbalta 30mg daily (started 23)  Elavil 25mg qhs  Klonopin 0.5mg BID PRN anxiety (takes once every night and occasionally takes extra one during the day if having increased anxiety or panic attack)    Medication side effects:  None    Current Substance use  Alcohol : down to 2 glasses of wine/night  Nicotine:  ~1 cigarette a day  Illicit's:  cannabis use - gradually tapering  Other Rx controlled substances (Ex-opiates, stimulants etc): None      PAST PSYCHIATRIC, MEDICAL, AND SOCIAL HISTORY REVIEWED  The patient's past medical, family and social history have been reviewed and updated as appropriate within the electronic medical record     MEDICAL REVIEW OF SYSTEMS  History obtained from the patient  General : NO chills or fever  Respiratory: NO cough, shortness of breath  Cardiovascular: NO chest pain, palpitations or " "racing heart  Gastrointestinal: NO nausea, vomiting, constipation or diarrhea  Neurological: NO confusion, dizziness, headaches or tremors  Psychiatric: please see HPI     ALL MEDICATIONS:    Current Outpatient Medications:     albuterol (VENTOLIN HFA) 90 mcg/actuation inhaler, Inhale 2 puffs into the lungs every 6 (six) hours as needed for Wheezing. Rescue, Disp: 18 g, Rfl: 0    amitriptyline (ELAVIL) 25 MG tablet, Take 1 tablet (25 mg total) by mouth every evening., Disp: 90 tablet, Rfl: 1    clonazePAM (KLONOPIN) 0.5 MG tablet, Take 1 tablet (0.5 mg total) by mouth 2 (two) times daily as needed for Anxiety., Disp: 60 tablet, Rfl: 3    DULoxetine (CYMBALTA) 30 MG capsule, Take 1 capsule (30 mg total) by mouth once daily., Disp: 30 capsule, Rfl: 1    FLUZONE QUAD [id number], PF, 60 mcg (15 mcg x 4)/0.5 mL Syrg, , Disp: , Rfl:     PFIZER COVID BIVAL,12Y UP,,PF, 30 mcg/0.3 mL injection, , Disp: , Rfl:     Current Facility-Administered Medications:     levonorgestreL (MIRENA) 20 mcg/24 hours (7 yrs) 52 mg IUD 1 Intra Uterine Device, 1 Intra Uterine Device, Intrauterine, , Kp Jorgensen Jr., MD, 1 Intra Uterine Device at 11/18/21 1530    ALLERGIES:  Review of patient's allergies indicates:  No Known Allergies    RELEVANT LABS/STUDIES:    Lab Results   Component Value Date    WBC 6.79 12/18/2023    HGB 17.6 (H) 12/18/2023    HCT 50.5 (H) 12/18/2023    MCV 95 12/18/2023     12/18/2023     BMP  Lab Results   Component Value Date     (L) 12/18/2023    K 3.7 12/18/2023     12/18/2023    CO2 25 12/18/2023    BUN 14 12/18/2023    CREATININE 0.7 12/18/2023    CALCIUM 10.1 12/18/2023    ANIONGAP 9 12/18/2023    ESTGFRAFRICA >60.0 12/01/2021    EGFRNONAA >60.0 12/01/2021     Lab Results   Component Value Date    ALT 13 12/18/2023    AST 19 12/18/2023    ALKPHOS 41 (L) 12/18/2023    BILITOT 0.7 12/18/2023     Lab Results   Component Value Date    TSH 1.174 12/01/2021     No results found for: "LABA1C", " ""HGBA1C"    VITALS  defer to nursing note    PHYSICAL EXAM  General: well developed, well nourished  Neurologic:   Gait: Normal   Psychomotor signs:  No involuntary movements or tremor  AIMS: none    PSYCHIATRIC EXAM:     Mental Status Exam:  Arousal: alert  Sensorium/Orientation: oriented to grossly intact  Behavior/Cooperation: normal, cooperative   Speech: normal tone, normal rate, normal pitch, normal volume  Language: grossly intact  Mood: " ok "   Affect: appropriate, mood-congruent  Thought Process: normal and logical  Thought Content:   Auditory hallucinations: NO  Visual hallucinations: NO  Paranoia: NO  Delusions:  NO  Suicidal ideation: NO  Homicidal ideation: NO  Attention/Concentration:  intact  Memory:    Recent:  Intact   Remote: Intact  Fund of Knowledge: Aware of current events   Abstract reasoning: proverbs were abstract  Insight: intact  Judgment: behavior is adequate to circumstances       IMPRESSION:    Aby Correa is a 33 y.o. female with history of MDD, SABI, PTSD who is admitted to BMU for worsening depression and PTSD sx.     Status/Progress:  Based on the examination today, the patient's problem(s) is/are inaquately(improved when not working) controlled.  New problems have not been presented today.     DIAGNOSES:  MDD, recurrent episode, moderate  Unspecified anxiety  Hx of PTSD  Cannabis use, r/o disorder  Alcohol use, r/o disorder    PLAN:    1) Continue BMU program with group and individual therapies.     2) Psych Med:  Continue Cymbalta 30mg daily, for depressive sx and anxiety; increase to 60mg daily tomorrow (12/21/23)  12/18/23 labs: CBC, CMP - WNL. LFT's WNL.  Continue Elavil 25mg qhs for now  Continue PRN Klonopin 0.5mg BID (takes once every night and occasionally takes extra one during the day if having increased anxiety or panic attack) for now; would recommend tapering off in near future given substance use hx.   Per pt, current med regimen helps with somatic GI sx; concerns " for chronic cannabis use contributing to GI sx)  Counseled pt on substance use; pt endorsed willingness to discontinue cannabis use and decrease alcohol use while in BMU  Discuss tapering off Klonopin (especially in light of concurrent alcohol use)    Discussed with patient informed consent, risks vs. benefits, alternative treatments, side effect profile and the inherent unpredictability of individual responses to these treatments. Answered any questions patient may have had. The patient expresses understanding of the above and displays the capacity to agree with this current plan     3) Other: Labs/medical problems/ collateral- as indicated        Juliane Sutton MD  [facility] PGY-IV   12/20/2023 10:10 AM

## 2023-12-20 NOTE — PROGRESS NOTES
Group Psychotherapy (PhD/LCSW)     Site: [facility]     Clinical status of patient: Intensive Outpatient Program (IOP)     Date: 12/20/2023     Group Focus: Mindfulness     Length of service: 13073 - 45-50 minutes     Number of patients in attendance: 9     Referred by: [facility]      Target symptoms: Depression and Anxiety     Patient's response to treatment: Active Listening & Self-Disclosure     Progress toward goals: Progressing adequately     Interval History: Session focus was Mindfulness: Mindfulness Kaaawa and Kindness Meditation. Patient's were introduced to the [place] Kindness meditation skill. Patients discussed the uses of the meditation, how to practice the meditation, and participated in a loving kindness meditation during session.     Diagnosis:       ICD-10-CM ICD-9-CM   1. Major depressive disorder, recurrent, moderate  F33.1 296.32   2. SABI (generalized anxiety disorder)  F41.1 300.02      Plan: Continue treatment on [facility]
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
## 2023-12-21 ENCOUNTER — HOSPITAL ENCOUNTER (OUTPATIENT)
Dept: PSYCHIATRY | Facility: HOSPITAL | Age: 33
Discharge: HOME | End: 2023-12-21
Payer: COMMERCIAL

## 2023-12-21 PROCEDURE — 90853 GROUP PSYCHOTHERAPY: CPT

## 2023-12-21 NOTE — PROGRESS NOTES
Group Psychotherapy (PhD/LCSW)     Site: [facility]     Clinical status of patient: Intensive Outpatient Program (IOP)     Date: 12/21/2023     Group Focus: Psychodynamic Processing Group     Length of service: 00349 - 45-60 minutes     Number of patients in attendance: 6     [facility]     Target symptoms: Anxiety, depression, and trauma      Patient's response to treatment: Active Listening     Progress toward goals: Progressing adequately     Interval History: Patient reflected on previous skills that assisted her obtaining support from others. Patient remained engaged, provided feedback to fellow group members, and offered words of encouragement to a graduating group member.       Diagnosis:       ICD-10-CM ICD-9-CM   1. Major Depressive Disorder, moderate F32.1 296.31   2. Generalized Anxiety Disorder F41.9 300.00   3. Post Traumatic Stress Disorder F43.10 309.89       Plan: Continue treatment on W

## 2023-12-21 NOTE — PROGRESS NOTES
Group Psychotherapy (PhD/LCSW)     Site: [facility]     Clinical status of patient: Intensive Outpatient Program (IOP)     Date: 12/20/2023     Group Focus: Psychodynamic Processing Group     Length of service: 97170 - 45-60 minutes     Number of patients in attendance: 5     Referred by: [facility]     Target symptoms: Anxiety, depression, and trauma      Patient's response to treatment: Active Listening     Progress toward goals: Progressing adequately     Interval History: Group engaged in values activity focused on clarifying various aspects of their lives. Patient remained engaged, provided feedback to a fellow group member and shared their responses.    Diagnosis:       ICD-10-CM ICD-9-CM   1. Major Depressive Disorder, moderate F32.1 296.31   2. Generalized Anxiety Disorder F41.9 300.00   3. Post Traumatic Stress Disorder F43.10 309.89       Plan: Continue treatment on [facility]

## 2023-12-21 NOTE — PLAN OF CARE
12/21/23 1548   Activity/Group Therapy Checklist   Group Other (Comments)  (Self Care)   Attendance Attended   Follows Direction Followed directions   Group Interactions/Observations Interacted appropriately;Alert;Sharing;Supportive   Affect/Mood Range Normal range   Affect/Mood Display Appropriate   Goal Progression Progressing      facilitated group session and discussed self care. SW used self care wheel activity to explain the importance of balance in several areas in life including, mental, physical, financial, psychological, personal and spiritual. SW discussed the benefits of reducing stress, lowering health risk and increasing energy overall.

## 2023-12-21 NOTE — PROGRESS NOTES
Group Psychotherapy (PhD/LCSW)     Site: [facility]     Clinical status of patient: Intensive Outpatient Program (IOP)     Date: 12/21/2023     Group Focus: Emotion Regulation     Length of service: 85958 - 45-50 minutes     Number of patients in attendance: 11     Referred by: [facility]      Target symptoms: Depression and Anxiety     Patient's response to treatment: Active Listening, Self-Disclosure, & Providing Feedback To Another Patient     Progress toward goals: Progressing adequately     Interval History: Session focus was Emotion Regulation:  Problem-Solving.  Patients were encouraged to identify problems, check the facts, generate solutions, look at pros/cons, and create action steps to use the solution.     Diagnosis:       ICD-10-CM ICD-9-CM   1. Major depressive disorder, recurrent, moderate  F33.1 296.32   2. SABI (generalized anxiety disorder)  F41.1 300.02      Plan: Continue treatment on [facility]

## 2023-12-22 ENCOUNTER — PATIENT MESSAGE (OUTPATIENT)
Dept: PSYCHIATRY | Facility: CLINIC | Age: 33
End: 2023-12-22
Payer: COMMERCIAL

## 2023-12-22 ENCOUNTER — HOSPITAL ENCOUNTER (OUTPATIENT)
Dept: PSYCHIATRY | Facility: HOSPITAL | Age: 33
Discharge: HOME | End: 2023-12-22
Payer: COMMERCIAL

## 2023-12-22 DIAGNOSIS — F41.1 GAD (GENERALIZED ANXIETY DISORDER): Primary | ICD-10-CM

## 2023-12-22 PROCEDURE — 99238 HOSP IP/OBS DSCHRG MGMT 30/<: CPT | Mod: ,,, | Performed by: PSYCHIATRY & NEUROLOGY

## 2023-12-22 PROCEDURE — 90853 GROUP PSYCHOTHERAPY: CPT

## 2023-12-22 PROCEDURE — 99238 PR HOSPITAL DISCHARGE DAY,<30 MIN: ICD-10-PCS | Mod: ,,, | Performed by: PSYCHIATRY & NEUROLOGY

## 2023-12-22 NOTE — PLAN OF CARE
"   12/22/23 1449   Activity/Group Therapy Checklist   Group Meditation/Relaxation   Attendance Attended   Follows Direction Followed directions   Group Interactions/Observations Interacted appropriately;Alert;Sharing;Supportive   Affect/Mood Range Normal range   Affect/Mood Display Appropriate   Goal Progression Progressing     Discussed mindfulness as a practice, the definition of mindfulness, and various research evidence to strengthen benefits of mindfulness. Engaged in "thought bubble" meditation where members were invited to witness body sensations, breath, and thoughts coming and going without attaching onto them.    "

## 2023-12-22 NOTE — PROGRESS NOTES
Group Psychotherapy (PhD/LCSW)     Site: [facility]     Clinical status of patient: Intensive Outpatient Program (IOP)     Date: 12/22/2023     Group Focus: Psychodynamic Processing Group     Length of service: 54600 - 45-60 minutes     Number of patients in attendance: 6     Referred by: [facility]     Target symptoms: Anxiety, depression, and trauma      Patient's response to treatment: Active Listening     Progress toward goals: Progressing adequately     Interval History: Patient reflected on her time in the IOP and how much she has benefited from engaging with fellow group members. Patient reflected on the small changes she had made and hopes to continue with outside of group, knowing that there is still much work to be done.       Diagnosis:       ICD-10-CM ICD-9-CM   1. Major Depressive Disorder, moderate F32.1 296.31   2. Generalized Anxiety Disorder F41.9 300.00   3. Post Traumatic Stress Disorder F43.10 309.89       Plan: Continue treatment on [facility]

## 2023-12-26 ENCOUNTER — OFFICE VISIT (OUTPATIENT)
Dept: PSYCHIATRY | Facility: CLINIC | Age: 33
End: 2023-12-26
Payer: COMMERCIAL

## 2023-12-26 DIAGNOSIS — F33.1 MDD (MAJOR DEPRESSIVE DISORDER), RECURRENT EPISODE, MODERATE: Primary | ICD-10-CM

## 2023-12-26 DIAGNOSIS — F43.10 PTSD (POST-TRAUMATIC STRESS DISORDER): ICD-10-CM

## 2023-12-26 DIAGNOSIS — F41.1 GAD (GENERALIZED ANXIETY DISORDER): ICD-10-CM

## 2023-12-26 PROCEDURE — 90837 PSYTX W PT 60 MINUTES: CPT | Mod: S$GLB,,, | Performed by: SOCIAL WORKER

## 2023-12-26 PROCEDURE — 90837 PR PSYCHOTHERAPY W/PATIENT, 60 MIN: ICD-10-PCS | Mod: S$GLB,,, | Performed by: SOCIAL WORKER

## 2023-12-26 NOTE — PROGRESS NOTES
Individual Psychotherapy (PhD/LCSW)    12/26/2023        Site:  [facility]         Therapeutic Intervention: Met with patient.     Outpatient - Insight oriented psychotherapy 60 min - CPT code 17581, Outpatient - Behavior modifying psychotherapy 60 min - CPT code 33708, Outpatient - Supportive psychotherapy 60 min - CPT Code 52852, and Outpatient - Interactive psychotherapy 60 min - CPT code 19698       Chief complaint/reason for encounter: PTSD, depression, anxiety     Interval history and content of current session: Pt is a 33 year-old single white female who presents this afternoon for a follow up therapy session. Pt reports that IOP was helpful in terms of getting her back into a healthy routine with eating, sleeping, decreased substance use. States that she did some work on radical acceptance, figuring out strategies to communicate disappointment with boyfriend and being part of a community. Pt has stayed in touch with a few folks from the program. Pt still endorses some depression but also feels like the addition of Wellbutrin has positively benefited her mood. States that she enjoyed [place] with boyfriend and friends. Pt not currently feeling stressed about school starting back on Jan 8th. Feels very supported by the school who helped pt financially with taking time off. States the [employer] also helped pt financially in support of taking care of her mental health. Pt reports that she is planning on attending a few sessions with the group prior to [place], however will likely avoid sessions where former friend will be participating.    Pt notified that clinician will be moving out of state in June. Will discuss transition of care prior to May.     Treatment plan:  Target symptoms: anxiety , adjustment, grief, work stress, PTSD  Why chosen therapy is appropriate versus another modality: relevant to diagnosis, patient responds to this modality, evidence based practice  Outcome monitoring  methods: self-report, observation  Therapeutic intervention type: insight oriented psychotherapy, behavior modifying psychotherapy, supportive psychotherapy    Risk parameters:  Patient reports no suicidal ideation  Patient reports no homicidal ideation  Patient reports no self-injurious behavior  Patient reports no violent behavior    Verbal deficits: None    Patient's response to intervention:  The patient's response to intervention is accepting.    Progress toward goals and other mental status changes:  The patient's progress toward goals is excellent.    Diagnosis:     ICD-10-CM ICD-9-CM   1. MDD (major depressive disorder), recurrent episode, moderate  F33.1 296.32   2. SABI (generalized anxiety disorder)  F41.1 300.02   3. PTSD (post-traumatic stress disorder)  F43.10 309.81       Plan:  individual psychotherapy, med mgmt     Return to clinic: 1 week    Length of Service (minutes): 60

## 2024-01-02 ENCOUNTER — PATIENT MESSAGE (OUTPATIENT)
Dept: PSYCHIATRY | Facility: CLINIC | Age: 34
End: 2024-01-02
Payer: COMMERCIAL

## 2024-01-23 ENCOUNTER — PATIENT MESSAGE (OUTPATIENT)
Dept: PSYCHIATRY | Facility: CLINIC | Age: 34
End: 2024-01-23
Payer: COMMERCIAL

## 2024-01-23 ENCOUNTER — OFFICE VISIT (OUTPATIENT)
Dept: PSYCHIATRY | Facility: CLINIC | Age: 34
End: 2024-01-23
Payer: COMMERCIAL

## 2024-01-23 DIAGNOSIS — F41.1 GAD (GENERALIZED ANXIETY DISORDER): ICD-10-CM

## 2024-01-23 DIAGNOSIS — F33.1 MDD (MAJOR DEPRESSIVE DISORDER), RECURRENT EPISODE, MODERATE: Primary | ICD-10-CM

## 2024-01-23 DIAGNOSIS — F43.10 PTSD (POST-TRAUMATIC STRESS DISORDER): ICD-10-CM

## 2024-01-23 PROCEDURE — 90834 PSYTX W PT 45 MINUTES: CPT | Mod: 95,,, | Performed by: SOCIAL WORKER

## 2024-01-23 NOTE — PROGRESS NOTES
Individual Psychotherapy (PhD/LCSW)    1/23/2024    The patient location is: [place], [place]  The chief complaint leading to consultation is: depression, anxiety, PTSD    Visit type: audiovisual    Face to Face time with patient: 45 min  50 minutes of total time spent on the encounter, which includes face to face time and non-face to face time preparing to see the patient (eg, review of tests), Obtaining and/or reviewing separately obtained history, Documenting clinical information in the electronic or other health record, Independently interpreting results (not separately reported) and communicating results to the patient/family/caregiver, or Care coordination (not separately reported).         Each patient to whom he or she provides medical services by telemedicine is:  (1) informed of the relationship between the physician and patient and the respective role of any other health care provider with respect to management of the patient; and (2) notified that he or she may decline to receive medical services by telemedicine and may withdraw from such care at any time.    Notes:          Site:  [facility]         Therapeutic Intervention: Met with patient.    Outpatient - Insight oriented psychotherapy 45 min - CPT code 16379, Outpatient - Behavior modifying psychotherapy 45 min - CPT code 50050, Outpatient - Supportive psychotherapy 45 min - CPT Code 79786, and Outpatient - Interactive psychotherapy 45 min - CPT code 59527     Chief complaint/reason for encounter: PTSD, depression, anxiety     Interval history and content of current session: Pt is a 33 year-old single white female who presents this afternoon for a follow up therapy session. Pt reports that she is doing better, feeling more motivated, santiago about [place]. 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 Pt states that she has been very upset about this but has spent a good  "amount of time processing. Attended the  and helped the mother out with the child's twin brother. Pt states that she realizes there is less pressure now that she has had a month break and has been able to set boundaries around turning off her phone when she is not at work. Still anxious about running into former friend at [employer] events. Feels "sick to my stomach" when attending events. Discussed utilizing CPT in next few sessions to help pt around shame she feels when it comes the traumatic event. Reports tolerating Cymbalta. States she is taking less Klonopin at night and has also cut back on drinking to every other night. Still smoking spliffs.     Treatment plan:  Target symptoms: anxiety , adjustment, grief, work stress, PTSD  Why chosen therapy is appropriate versus another modality: relevant to diagnosis, patient responds to this modality, evidence based practice  Outcome monitoring methods: self-report, observation  Therapeutic intervention type: insight oriented psychotherapy, behavior modifying psychotherapy, supportive psychotherapy    Risk parameters:  Patient reports no suicidal ideation  Patient reports no homicidal ideation  Patient reports no self-injurious behavior  Patient reports no violent behavior    Verbal deficits: None    Patient's response to intervention:  The patient's response to intervention is accepting.    Progress toward goals and other mental status changes:  The patient's progress toward goals is excellent.    Diagnosis:     ICD-10-CM ICD-9-CM   1. MDD (major depressive disorder), recurrent episode, moderate  F33.1 296.32   2. SABI (generalized anxiety disorder)  F41.1 300.02   3. PTSD (post-traumatic stress disorder)  F43.10 309.81       Plan:  individual psychotherapy, med mgmt     Return to clinic: as scheduled    Length of Service (minutes): 60    "

## 2024-01-25 ENCOUNTER — OFFICE VISIT (OUTPATIENT)
Dept: PSYCHIATRY | Facility: CLINIC | Age: 34
End: 2024-01-25
Payer: COMMERCIAL

## 2024-01-25 ENCOUNTER — PATIENT MESSAGE (OUTPATIENT)
Dept: PSYCHIATRY | Facility: CLINIC | Age: 34
End: 2024-01-25
Payer: COMMERCIAL

## 2024-01-25 DIAGNOSIS — F10.10 ALCOHOL ABUSE: ICD-10-CM

## 2024-01-25 DIAGNOSIS — G47.00 INSOMNIA, UNSPECIFIED TYPE: ICD-10-CM

## 2024-01-25 DIAGNOSIS — F41.1 GAD (GENERALIZED ANXIETY DISORDER): ICD-10-CM

## 2024-01-25 DIAGNOSIS — F43.10 PTSD (POST-TRAUMATIC STRESS DISORDER): ICD-10-CM

## 2024-01-25 DIAGNOSIS — F33.1 MDD (MAJOR DEPRESSIVE DISORDER), RECURRENT EPISODE, MODERATE: Primary | ICD-10-CM

## 2024-01-25 PROCEDURE — 1159F MED LIST DOCD IN RCRD: CPT | Mod: CPTII,95,, | Performed by: NURSE PRACTITIONER

## 2024-01-25 PROCEDURE — 1160F RVW MEDS BY RX/DR IN RCRD: CPT | Mod: CPTII,95,, | Performed by: NURSE PRACTITIONER

## 2024-01-25 PROCEDURE — 99214 OFFICE O/P EST MOD 30 MIN: CPT | Mod: 95,,, | Performed by: NURSE PRACTITIONER

## 2024-01-25 PROCEDURE — 90833 PSYTX W PT W E/M 30 MIN: CPT | Mod: 95,,, | Performed by: NURSE PRACTITIONER

## 2024-01-25 RX ORDER — DULOXETIN HYDROCHLORIDE 60 MG/1
60 CAPSULE, DELAYED RELEASE ORAL DAILY
Qty: 30 CAPSULE | Refills: 2 | Status: SHIPPED | OUTPATIENT
Start: 2024-01-25 | End: 2024-04-30 | Stop reason: SDUPTHER

## 2024-01-25 RX ORDER — AMITRIPTYLINE HYDROCHLORIDE 50 MG/1
50 TABLET, FILM COATED ORAL NIGHTLY
Qty: 30 TABLET | Refills: 3 | Status: SHIPPED | OUTPATIENT
Start: 2024-01-25 | End: 2024-05-21 | Stop reason: SDUPTHER
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
## 2024-02-01 ENCOUNTER — OFFICE VISIT (OUTPATIENT)
Dept: PSYCHIATRY | Facility: CLINIC | Age: 34
End: 2024-02-01
Payer: COMMERCIAL

## 2024-02-01 DIAGNOSIS — F41.1 GAD (GENERALIZED ANXIETY DISORDER): ICD-10-CM

## 2024-02-01 DIAGNOSIS — F43.10 PTSD (POST-TRAUMATIC STRESS DISORDER): ICD-10-CM

## 2024-02-01 DIAGNOSIS — F33.1 MDD (MAJOR DEPRESSIVE DISORDER), RECURRENT EPISODE, MODERATE: Primary | ICD-10-CM

## 2024-02-01 PROCEDURE — 90837 PSYTX W PT 60 MINUTES: CPT | Mod: S$GLB,,, | Performed by: SOCIAL WORKER

## 2024-02-01 NOTE — PROGRESS NOTES
"  Individual Psychotherapy (PhD/LCSW)    2/1/2024      Site:  [facility]         Therapeutic Intervention: Met with patient.     Outpatient - Insight oriented psychotherapy 60 min - CPT code 47622, Outpatient - Behavior modifying psychotherapy 60 min - CPT code 05223, Outpatient - Supportive psychotherapy 60 min - CPT Code 65087, and Outpatient - Interactive psychotherapy 60 min - CPT code 47949     Chief complaint/reason for encounter: PTSD, depression, anxiety     Interval history and content of current session: Pt is a 33 year-old single white female who presents this afternoon for a follow up therapy session. Pt reports nervousness about marching in [place]. Worried about interactions with Rita that could be triggering. Reviewed pt's plan if Rita tries to speak with pt at the parade. Pt's response will be, "I'm not interested in talking to you." Pt will then walk away. States that her boyfriend will be marching as a support person and her best friend Annie will also be there photographing the group. Pt states that she feels she has support if needed and may remind the e-board that he is not to attend the pre-party. Pt states that she is feeling happy about the [place] season. Pt states that she enjoyed [employer]. Had an argument with [employer] recently where they hung up on each other. This led to a deeper conversation about their relationship and they can better support each other. Pt reports that she is planning on looking for another therapist. Has one more session in February with clinician and then nothing until May. Agreed that pt could start looking to see ifs he can find someone in March. Pt processing termination appropriately. Feeling less stressed financially. About to rent out top space of mother's building which will put their finances in the positive.       Treatment plan:  Target symptoms: anxiety , adjustment, grief, work stress, PTSD  Why chosen therapy is appropriate versus another " modality: relevant to diagnosis, patient responds to this modality, evidence based practice  Outcome monitoring methods: self-report, observation  Therapeutic intervention type: insight oriented psychotherapy, behavior modifying psychotherapy, supportive psychotherapy    Risk parameters:  Patient reports no suicidal ideation  Patient reports no homicidal ideation  Patient reports no self-injurious behavior  Patient reports no violent behavior    Verbal deficits: None    Patient's response to intervention:  The patient's response to intervention is accepting.    Progress toward goals and other mental status changes:  The patient's progress toward goals is excellent.    Diagnosis:     ICD-10-CM ICD-9-CM   1. MDD (major depressive disorder), recurrent episode, moderate  F33.1 296.32   2. SABI (generalized anxiety disorder)  F41.1 300.02   3. PTSD (post-traumatic stress disorder)  F43.10 309.81       Plan:  individual psychotherapy, med mgmt     Return to clinic: as scheduled    Length of Service (minutes): 60

## 2024-02-15 ENCOUNTER — OFFICE VISIT (OUTPATIENT)
Dept: PSYCHIATRY | Facility: CLINIC | Age: 34
End: 2024-02-15
Payer: COMMERCIAL

## 2024-02-15 DIAGNOSIS — F33.1 MDD (MAJOR DEPRESSIVE DISORDER), RECURRENT EPISODE, MODERATE: Primary | ICD-10-CM

## 2024-02-15 DIAGNOSIS — F41.1 GAD (GENERALIZED ANXIETY DISORDER): ICD-10-CM

## 2024-02-15 DIAGNOSIS — F43.10 PTSD (POST-TRAUMATIC STRESS DISORDER): ICD-10-CM

## 2024-02-15 PROCEDURE — 90837 PSYTX W PT 60 MINUTES: CPT | Mod: S$GLB,,, | Performed by: SOCIAL WORKER
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
## 2024-03-07 ENCOUNTER — OFFICE VISIT (OUTPATIENT)
Dept: PSYCHIATRY | Facility: CLINIC | Age: 34
End: 2024-03-07
Payer: COMMERCIAL

## 2024-03-07 DIAGNOSIS — F33.1 MDD (MAJOR DEPRESSIVE DISORDER), RECURRENT EPISODE, MODERATE: Primary | ICD-10-CM

## 2024-03-07 DIAGNOSIS — F43.10 PTSD (POST-TRAUMATIC STRESS DISORDER): ICD-10-CM

## 2024-03-07 DIAGNOSIS — F41.1 GAD (GENERALIZED ANXIETY DISORDER): ICD-10-CM

## 2024-03-07 PROCEDURE — 90837 PSYTX W PT 60 MINUTES: CPT | Mod: S$GLB,,, | Performed by: SOCIAL WORKER
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
## 2024-03-14 ENCOUNTER — PATIENT MESSAGE (OUTPATIENT)
Dept: ADMINISTRATIVE | Facility: HOSPITAL | Age: 34
End: 2024-03-14
Payer: COMMERCIAL

## 2024-03-14 ENCOUNTER — PATIENT OUTREACH (OUTPATIENT)
Dept: ADMINISTRATIVE | Facility: HOSPITAL | Age: 34
End: 2024-03-14
Payer: COMMERCIAL

## 2024-03-14 ENCOUNTER — PATIENT MESSAGE (OUTPATIENT)
Dept: PSYCHIATRY | Facility: CLINIC | Age: 34
End: 2024-03-14
Payer: COMMERCIAL

## 2024-03-28 ENCOUNTER — OFFICE VISIT (OUTPATIENT)
Dept: PSYCHIATRY | Facility: CLINIC | Age: 34
End: 2024-03-28
Payer: COMMERCIAL

## 2024-03-28 DIAGNOSIS — F41.1 GAD (GENERALIZED ANXIETY DISORDER): ICD-10-CM

## 2024-03-28 DIAGNOSIS — F43.10 PTSD (POST-TRAUMATIC STRESS DISORDER): ICD-10-CM

## 2024-03-28 DIAGNOSIS — F33.1 MDD (MAJOR DEPRESSIVE DISORDER), RECURRENT EPISODE, MODERATE: Primary | ICD-10-CM

## 2024-03-28 PROCEDURE — 90837 PSYTX W PT 60 MINUTES: CPT | Mod: S$GLB,,, | Performed by: SOCIAL WORKER
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
## 2024-04-04 ENCOUNTER — OFFICE VISIT (OUTPATIENT)
Dept: PSYCHIATRY | Facility: CLINIC | Age: 34
End: 2024-04-04
Payer: COMMERCIAL

## 2024-04-04 DIAGNOSIS — F43.10 PTSD (POST-TRAUMATIC STRESS DISORDER): ICD-10-CM

## 2024-04-04 DIAGNOSIS — F41.1 GAD (GENERALIZED ANXIETY DISORDER): ICD-10-CM

## 2024-04-04 DIAGNOSIS — F33.1 MDD (MAJOR DEPRESSIVE DISORDER), RECURRENT EPISODE, MODERATE: Primary | ICD-10-CM

## 2024-04-04 PROCEDURE — 90837 PSYTX W PT 60 MINUTES: CPT | Mod: S$GLB,,, | Performed by: SOCIAL WORKER

## 2024-04-05 NOTE — PROGRESS NOTES
Individual Psychotherapy (PhD/LCSW)    2024      Site:  [facility]         Therapeutic Intervention: Met with patient.     Outpatient - Insight oriented psychotherapy 60 min - CPT code 60148, Outpatient - Behavior modifying psychotherapy 60 min - CPT code 74170, Outpatient - Supportive psychotherapy 60 min - CPT Code 67149, and Outpatient - Interactive psychotherapy 60 min - CPT code 22566     Chief complaint/reason for encounter: PTSD, depression, anxiety     Interval history and content of current session: Pt is a 33 year-old single white female who presents this afternoon for a follow up therapy session. Pt continues to process grief around  cat. Coping appropriately.         Cognitive Processing Therapy (CPT), Session #3  Working with Events, Thoughts, and Feelings  PCL-5:  Did not complete for this session. Will take for Session 4.   Worksheets completed:  9; Reviewed them in detail during today's session.     Session Focus:  Stuck Point log  ABC worksheets review     Practice Assignment:  Complete Challenging Questions Worksheets. Pt already familiar with these as she has done CPT for original PTSD related incident.       Treatment plan:  Target symptoms: anxiety , adjustment, grief, work stress, PTSD  Why chosen therapy is appropriate versus another modality: relevant to diagnosis, patient responds to this modality, evidence based practice  Outcome monitoring methods: self-report, observation  Therapeutic intervention type: insight oriented psychotherapy, behavior modifying psychotherapy, supportive psychotherapy    Risk parameters:  Patient reports no suicidal ideation  Patient reports no homicidal ideation  Patient reports no self-injurious behavior  Patient reports no violent behavior    Verbal deficits: None    Patient's response to intervention:  The patient's response to intervention is accepting.    Progress toward goals and other mental status changes:  The patient's progress  toward goals is excellent.    Diagnosis:     ICD-10-CM ICD-9-CM   1. MDD (major depressive disorder), recurrent episode, moderate  F33.1 296.32   2. SABI (generalized anxiety disorder)  F41.1 300.02   3. PTSD (post-traumatic stress disorder)  F43.10 309.81       Plan:  individual psychotherapy, med mgmt     Return to clinic: as scheduled    Length of Service (minutes): 60

## 2024-04-11 ENCOUNTER — OFFICE VISIT (OUTPATIENT)
Dept: PSYCHIATRY | Facility: CLINIC | Age: 34
End: 2024-04-11
Payer: COMMERCIAL

## 2024-04-11 DIAGNOSIS — F41.1 GAD (GENERALIZED ANXIETY DISORDER): ICD-10-CM

## 2024-04-11 DIAGNOSIS — F33.1 MDD (MAJOR DEPRESSIVE DISORDER), RECURRENT EPISODE, MODERATE: Primary | ICD-10-CM

## 2024-04-11 DIAGNOSIS — F43.10 PTSD (POST-TRAUMATIC STRESS DISORDER): ICD-10-CM

## 2024-04-11 PROCEDURE — 90837 PSYTX W PT 60 MINUTES: CPT | Mod: S$GLB,,, | Performed by: SOCIAL WORKER

## 2024-04-11 NOTE — PROGRESS NOTES
Individual Psychotherapy (PhD/LCSW)    4/11/2024      Site:  [facility]         Therapeutic Intervention: Met with patient.     Outpatient - Insight oriented psychotherapy 60 min - CPT code 94864, Outpatient - Behavior modifying psychotherapy 60 min - CPT code 81574, Outpatient - Supportive psychotherapy 60 min - CPT Code 76813, and Outpatient - Interactive psychotherapy 60 min - CPT code 41815     Chief complaint/reason for encounter: PTSD, depression, anxiety     Interval history and content of current session: Pt is a 33 year-old single white female who presents this afternoon for a follow up therapy session.    Cognitive Processing Therapy (CPT), Session #5  Using the Challenging Questions Worksheet  PCL-5:  23  PHQ-9: 6  Worksheets completed:  4    Session Focus:  Stuck Point log  Challenging Questions worksheets review  Introduced Patterns of Problematic Thinking worksheet     Practice Assignment:  1) Stuck point log - Add to the stuck point log as needed  2) Patterns of Problematic worksheets - Complete at least one related to a Stuck Point from the Stuck Point Log.  Complete one worksheet every day.          Treatment plan:  Target symptoms: anxiety , adjustment, grief, work stress, PTSD  Why chosen therapy is appropriate versus another modality: relevant to diagnosis, patient responds to this modality, evidence based practice  Outcome monitoring methods: self-report, observation  Therapeutic intervention type: insight oriented psychotherapy, behavior modifying psychotherapy, supportive psychotherapy    Risk parameters:  Patient reports no suicidal ideation  Patient reports no homicidal ideation  Patient reports no self-injurious behavior  Patient reports no violent behavior    Verbal deficits: None    Patient's response to intervention:  The patient's response to intervention is accepting.    Progress toward goals and other mental status changes:  The patient's progress toward goals is  excellent.    Diagnosis:     ICD-10-CM ICD-9-CM   1. MDD (major depressive disorder), recurrent episode, moderate  F33.1 296.32   2. SABI (generalized anxiety disorder)  F41.1 300.02   3. PTSD (post-traumatic stress disorder)  F43.10 309.81       Plan:  individual psychotherapy, med mgmt     Return to clinic: as scheduled    Length of Service (minutes): 60

## 2024-04-18 ENCOUNTER — OFFICE VISIT (OUTPATIENT)
Dept: PSYCHIATRY | Facility: CLINIC | Age: 34
End: 2024-04-18
Payer: COMMERCIAL

## 2024-04-18 DIAGNOSIS — F43.10 PTSD (POST-TRAUMATIC STRESS DISORDER): ICD-10-CM

## 2024-04-18 DIAGNOSIS — F41.1 GAD (GENERALIZED ANXIETY DISORDER): ICD-10-CM

## 2024-04-18 DIAGNOSIS — F33.1 MDD (MAJOR DEPRESSIVE DISORDER), RECURRENT EPISODE, MODERATE: Primary | ICD-10-CM

## 2024-04-18 PROCEDURE — 90837 PSYTX W PT 60 MINUTES: CPT | Mod: S$GLB,,, | Performed by: SOCIAL WORKER
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
## 2024-04-30 ENCOUNTER — PATIENT MESSAGE (OUTPATIENT)
Dept: PSYCHIATRY | Facility: CLINIC | Age: 34
End: 2024-04-30
Payer: COMMERCIAL

## 2024-04-30 DIAGNOSIS — F33.1 MDD (MAJOR DEPRESSIVE DISORDER), RECURRENT EPISODE, MODERATE: Primary | ICD-10-CM

## 2024-04-30 RX ORDER — DULOXETIN HYDROCHLORIDE 60 MG/1
60 CAPSULE, DELAYED RELEASE ORAL DAILY
Qty: 30 CAPSULE | Refills: 2 | Status: SHIPPED | OUTPATIENT
Start: 2024-04-30

## 2024-05-02 ENCOUNTER — OFFICE VISIT (OUTPATIENT)
Dept: PSYCHIATRY | Facility: CLINIC | Age: 34
End: 2024-05-02
Payer: COMMERCIAL

## 2024-05-02 DIAGNOSIS — F41.1 GAD (GENERALIZED ANXIETY DISORDER): ICD-10-CM

## 2024-05-02 DIAGNOSIS — F43.10 PTSD (POST-TRAUMATIC STRESS DISORDER): ICD-10-CM

## 2024-05-02 DIAGNOSIS — F33.1 MDD (MAJOR DEPRESSIVE DISORDER), RECURRENT EPISODE, MODERATE: Primary | ICD-10-CM

## 2024-05-02 PROCEDURE — 90837 PSYTX W PT 60 MINUTES: CPT | Mod: S$GLB,,, | Performed by: SOCIAL WORKER

## 2024-05-02 NOTE — PROGRESS NOTES
Individual Psychotherapy (PhD/LCSW)    5/2/2024      Site:  [facility]         Therapeutic Intervention: Met with patient.     Outpatient - Insight oriented psychotherapy 60 min - CPT code 87202, Outpatient - Behavior modifying psychotherapy 60 min - CPT code 50544, Outpatient - Supportive psychotherapy 60 min - CPT Code 02822, and Outpatient - Interactive psychotherapy 60 min - CPT code 47716     Chief complaint/reason for encounter: PTSD, depression, anxiety     Interval history and content of current session: Pt is a 33 year-old single white female who presents this afternoon for a follow up therapy session. Had a really good time with family and friends last week. States grandmother's visit was really special. Is thrilled that grandmother wrote her a handwritten letter after the trip stating how good it was to spend time with pt.         Cognitive Processing Therapy (CPT), Session #7  Challenging Beliefs Worksheet and Introductions to Safety and Trust Modules  PCL-5:  14  PHQ-9: 6  Worksheets completed:  3    Session Focus:  Stuck Point log  Challenging Beliefs worksheets review  Introduced an Overview of the Five Themes  Introduced the Safety and Trust Themes     Practice Assignment:  1) Stuck point log - Add to the stuck point log as needed  2) Challenging Beliefs worksheets - Complete at a few worksheets on Safety and Trust, if applicable.  For the remaining worksheets, choose Stuck Points from the Stuck Point Log that are in need of continued attention, and write them down on the worksheets to complete outside of session.  Complete one worksheet every day.        Treatment plan:  Target symptoms: anxiety , adjustment, grief, work stress, PTSD  Why chosen therapy is appropriate versus another modality: relevant to diagnosis, patient responds to this modality, evidence based practice  Outcome monitoring methods: self-report, observation  Therapeutic intervention type: insight oriented psychotherapy,  behavior modifying psychotherapy, supportive psychotherapy    Risk parameters:  Patient reports no suicidal ideation  Patient reports no homicidal ideation  Patient reports no self-injurious behavior  Patient reports no violent behavior    Verbal deficits: None    Patient's response to intervention:  The patient's response to intervention is accepting.    Progress toward goals and other mental status changes:  The patient's progress toward goals is excellent.    Diagnosis:     ICD-10-CM ICD-9-CM   1. MDD (major depressive disorder), recurrent episode, moderate  F33.1 296.32   2. SABI (generalized anxiety disorder)  F41.1 300.02   3. PTSD (post-traumatic stress disorder)  F43.10 309.81       Plan:  individual psychotherapy, med mgmt     Return to clinic: as scheduled    Length of Service (minutes): 60

## 2024-05-09 ENCOUNTER — OFFICE VISIT (OUTPATIENT)
Dept: PSYCHIATRY | Facility: CLINIC | Age: 34
End: 2024-05-09
Payer: COMMERCIAL

## 2024-05-09 DIAGNOSIS — F43.10 PTSD (POST-TRAUMATIC STRESS DISORDER): ICD-10-CM

## 2024-05-09 DIAGNOSIS — F33.1 MDD (MAJOR DEPRESSIVE DISORDER), RECURRENT EPISODE, MODERATE: Primary | ICD-10-CM

## 2024-05-09 DIAGNOSIS — F41.1 GAD (GENERALIZED ANXIETY DISORDER): ICD-10-CM

## 2024-05-09 PROCEDURE — 90837 PSYTX W PT 60 MINUTES: CPT | Mod: S$GLB,,, | Performed by: SOCIAL WORKER

## 2024-05-09 NOTE — PROGRESS NOTES
Individual Psychotherapy (PhD/LCSW)    5/9/2024      Site:  [facility]         Therapeutic Intervention: Met with patient.     Outpatient - Insight oriented psychotherapy 60 min - CPT code 24653, Outpatient - Behavior modifying psychotherapy 60 min - CPT code 71631, Outpatient - Supportive psychotherapy 60 min - CPT Code 98011, and Outpatient - Interactive psychotherapy 60 min - CPT code 51656     Chief complaint/reason for encounter: PTSD, depression, anxiety     Interval history and content of current session: Pt is a 33 year-old single white female who presents this afternoon for a follow up therapy session. Got a job interview for [employer] next Friday via Zoom before final session. Pt excited and feels validated.      Cognitive Processing Therapy (CPT), Session #10/11  Processing Power/Control, Esteem, and Intimacy  PCL-5:  15  PHQ-9: 6  Worksheets completed:  4    Session Focus:  Stuck Point log  Challenging Beliefs worksheets review  Introduced the Power/Control, Esteem and Intimacy Themes     Practice Assignment:  1) Stuck point log - Add to the stuck point log as needed  2) Challenging Beliefs worksheets - Complete at least one worksheet on each theme.  For the remaining worksheets, choose Stuck Points from the Stuck Point Log that are in need of continued attention, and write them down on the worksheets to complete outside of session.  Complete one worksheet every day.  3)Write a new Impact Statement that addresses what it now means that the traumatic event occurred, and what your current beliefs are in regard to the five topics of safety, trust, power/control, esteem, and intimacy.      Treatment plan:  Target symptoms: anxiety , adjustment, grief, work stress, PTSD  Why chosen therapy is appropriate versus another modality: relevant to diagnosis, patient responds to this modality, evidence based practice  Outcome monitoring methods: self-report, observation  Therapeutic intervention type: insight  oriented psychotherapy, behavior modifying psychotherapy, supportive psychotherapy    Risk parameters:  Patient reports no suicidal ideation  Patient reports no homicidal ideation  Patient reports no self-injurious behavior  Patient reports no violent behavior    Verbal deficits: None    Patient's response to intervention:  The patient's response to intervention is accepting.    Progress toward goals and other mental status changes:  The patient's progress toward goals is excellent.    Diagnosis:     ICD-10-CM ICD-9-CM   1. MDD (major depressive disorder), recurrent episode, moderate  F33.1 296.32   2. SABI (generalized anxiety disorder)  F41.1 300.02   3. PTSD (post-traumatic stress disorder)  F43.10 309.81       Plan:  individual psychotherapy, med mgmt     Return to clinic: as scheduled    Length of Service (minutes): 60

## 2024-05-16 ENCOUNTER — OFFICE VISIT (OUTPATIENT)
Dept: PSYCHIATRY | Facility: CLINIC | Age: 34
End: 2024-05-16
Payer: COMMERCIAL

## 2024-05-16 DIAGNOSIS — F41.1 GAD (GENERALIZED ANXIETY DISORDER): ICD-10-CM

## 2024-05-16 DIAGNOSIS — F33.1 MDD (MAJOR DEPRESSIVE DISORDER), RECURRENT EPISODE, MODERATE: Primary | ICD-10-CM

## 2024-05-16 DIAGNOSIS — F43.10 PTSD (POST-TRAUMATIC STRESS DISORDER): ICD-10-CM

## 2024-05-16 PROCEDURE — 90837 PSYTX W PT 60 MINUTES: CPT | Mod: S$GLB,,, | Performed by: SOCIAL WORKER

## 2024-05-16 NOTE — PROGRESS NOTES
Individual Psychotherapy (PhD/LCSW)    5/16/2024      Site:  [facility]         Therapeutic Intervention: Met with patient.     Outpatient - Insight oriented psychotherapy 60 min - CPT code 50973, Outpatient - Behavior modifying psychotherapy 60 min - CPT code 16473, Outpatient - Supportive psychotherapy 60 min - CPT Code 02322, and Outpatient - Interactive psychotherapy 60 min - CPT code 41094     Chief complaint/reason for encounter: PTSD, depression, anxiety     Interval history and content of current session: Pt is a 33 year-old single white female who presents this afternoon for a follow up therapy session. Reports that interview for  with [employer] went well. Not sure when she will hear back but states she feels hopeful. Things are going well in relationship with boyfriend. Setting boundaries with mother. Pt to determine therapy choice once she figures out whether her insurance will change. Pt to follow up with the resources provided by this clinician when she is ready to make that decision.      Cognitive Processing Therapy (CPT), Session #12  Processing Intimacy and Final Impact Statement  PCL-5:  13  PHQ-9: 4  Worksheets completed:  4  Impact Statement completed:  Yes/No    Session Focus:  Challenging Beliefs worksheets review  Original and New Impact Statements review  Reviewed course of treatment and progress  Identified goals for the future     Practice Assignment:  1) Continue to review treatment materials as needed.  2) Continue to do Challenging Beliefs worksheets as needed.  3) Continue to do Nice/Worthwhile Things.    Plan: Group CPT has ended.  All members will be referred back to referring provider.  Return to clinic: as scheduled by referring provider      Treatment plan:  Target symptoms: anxiety , adjustment, grief, work stress, PTSD  Why chosen therapy is appropriate versus another modality: relevant to diagnosis, patient responds to this modality, evidence based  practice  Outcome monitoring methods: self-report, observation  Therapeutic intervention type: insight oriented psychotherapy, behavior modifying psychotherapy, supportive psychotherapy    Risk parameters:  Patient reports no suicidal ideation  Patient reports no homicidal ideation  Patient reports no self-injurious behavior  Patient reports no violent behavior    Verbal deficits: None    Patient's response to intervention:  The patient's response to intervention is accepting.    Progress toward goals and other mental status changes:  The patient's progress toward goals is excellent.    Diagnosis:     ICD-10-CM ICD-9-CM   1. MDD (major depressive disorder), recurrent episode, moderate  F33.1 296.32   2. SABI (generalized anxiety disorder)  F41.1 300.02   3. PTSD (post-traumatic stress disorder)  F43.10 309.81       Plan:  individual psychotherapy, med mgmt     Return to clinic: as scheduled    Length of Service (minutes): 60

## 2024-05-20 ENCOUNTER — PATIENT MESSAGE (OUTPATIENT)
Dept: PSYCHIATRY | Facility: CLINIC | Age: 34
End: 2024-05-20
Payer: COMMERCIAL

## 2024-05-20 DIAGNOSIS — F33.1 MDD (MAJOR DEPRESSIVE DISORDER), RECURRENT EPISODE, MODERATE: Primary | ICD-10-CM

## 2024-05-20 RX ORDER — AMITRIPTYLINE HYDROCHLORIDE 50 MG/1
50 TABLET, FILM COATED ORAL NIGHTLY
Qty: 30 TABLET | Refills: 3 | OUTPATIENT
Start: 2024-05-20 | End: 2025-05-20

## 2024-05-21 RX ORDER — AMITRIPTYLINE HYDROCHLORIDE 50 MG/1
50 TABLET, FILM COATED ORAL NIGHTLY
Qty: 30 TABLET | Refills: 3 | Status: SHIPPED | OUTPATIENT
Start: 2024-05-21 | End: 2025-05-21

## 2024-07-22 ENCOUNTER — PATIENT OUTREACH (OUTPATIENT)
Dept: ADMINISTRATIVE | Facility: HOSPITAL | Age: 34
End: 2024-07-22
Payer: COMMERCIAL

## 2024-07-29 DIAGNOSIS — F33.1 MDD (MAJOR DEPRESSIVE DISORDER), RECURRENT EPISODE, MODERATE: ICD-10-CM

## 2024-07-29 RX ORDER — DULOXETIN HYDROCHLORIDE 60 MG/1
60 CAPSULE, DELAYED RELEASE ORAL DAILY
Qty: 30 CAPSULE | Refills: 2 | Status: SHIPPED | OUTPATIENT
Start: 2024-07-29

## 2024-07-29 RX ORDER — DULOXETIN HYDROCHLORIDE 60 MG/1
60 CAPSULE, DELAYED RELEASE ORAL DAILY
Qty: 30 CAPSULE | Refills: 2 | OUTPATIENT
Start: 2024-07-29

## 2024-08-20 RX ORDER — AMITRIPTYLINE HYDROCHLORIDE 50 MG/1
50 TABLET, FILM COATED ORAL NIGHTLY
Qty: 30 TABLET | Refills: 3 | Status: SHIPPED | OUTPATIENT
Start: 2024-08-20 | End: 2025-08-20

## 2024-09-23 ENCOUNTER — OFFICE VISIT (OUTPATIENT)
Dept: PSYCHIATRY | Facility: CLINIC | Age: 34
End: 2024-09-23
Payer: COMMERCIAL

## 2024-09-23 VITALS
WEIGHT: 153.44 LBS | DIASTOLIC BLOOD PRESSURE: 83 MMHG | BODY MASS INDEX: 23.33 KG/M2 | HEART RATE: 72 BPM | SYSTOLIC BLOOD PRESSURE: 129 MMHG

## 2024-09-23 DIAGNOSIS — F33.1 MDD (MAJOR DEPRESSIVE DISORDER), RECURRENT EPISODE, MODERATE: ICD-10-CM

## 2024-09-23 DIAGNOSIS — G47.00 INSOMNIA, UNSPECIFIED TYPE: ICD-10-CM

## 2024-09-23 DIAGNOSIS — F43.10 PTSD (POST-TRAUMATIC STRESS DISORDER): ICD-10-CM

## 2024-09-23 DIAGNOSIS — F41.1 GAD (GENERALIZED ANXIETY DISORDER): Primary | ICD-10-CM

## 2024-09-23 PROCEDURE — 3074F SYST BP LT 130 MM HG: CPT | Mod: CPTII,S$GLB,, | Performed by: NURSE PRACTITIONER

## 2024-09-23 PROCEDURE — G2211 COMPLEX E/M VISIT ADD ON: HCPCS | Mod: S$GLB,,, | Performed by: NURSE PRACTITIONER

## 2024-09-23 PROCEDURE — 3079F DIAST BP 80-89 MM HG: CPT | Mod: CPTII,S$GLB,, | Performed by: NURSE PRACTITIONER

## 2024-09-23 PROCEDURE — 99999 PR PBB SHADOW E&M-EST. PATIENT-LVL III: CPT | Mod: PBBFAC,,, | Performed by: NURSE PRACTITIONER

## 2024-09-23 PROCEDURE — 1159F MED LIST DOCD IN RCRD: CPT | Mod: CPTII,S$GLB,, | Performed by: NURSE PRACTITIONER

## 2024-09-23 PROCEDURE — 3008F BODY MASS INDEX DOCD: CPT | Mod: CPTII,S$GLB,, | Performed by: NURSE PRACTITIONER

## 2024-09-23 PROCEDURE — 1160F RVW MEDS BY RX/DR IN RCRD: CPT | Mod: CPTII,S$GLB,, | Performed by: NURSE PRACTITIONER

## 2024-09-23 PROCEDURE — 99214 OFFICE O/P EST MOD 30 MIN: CPT | Mod: S$GLB,,, | Performed by: NURSE PRACTITIONER

## 2024-09-23 RX ORDER — DULOXETIN HYDROCHLORIDE 60 MG/1
60 CAPSULE, DELAYED RELEASE ORAL DAILY
Qty: 90 CAPSULE | Refills: 2 | Status: SHIPPED | OUTPATIENT
Start: 2024-09-23

## 2024-09-23 RX ORDER — AMITRIPTYLINE HYDROCHLORIDE 50 MG/1
TABLET, FILM COATED ORAL
Qty: 60 TABLET | Refills: 3 | Status: SHIPPED | OUTPATIENT
Start: 2024-09-23
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
## 2025-01-17 ENCOUNTER — PATIENT MESSAGE (OUTPATIENT)
Dept: PSYCHIATRY | Facility: CLINIC | Age: 35
End: 2025-01-17
Payer: COMMERCIAL

## 2025-01-28 ENCOUNTER — OFFICE VISIT (OUTPATIENT)
Dept: PSYCHIATRY | Facility: CLINIC | Age: 35
End: 2025-01-28
Payer: COMMERCIAL

## 2025-01-28 DIAGNOSIS — F41.1 GAD (GENERALIZED ANXIETY DISORDER): Primary | ICD-10-CM

## 2025-01-28 DIAGNOSIS — F43.10 PTSD (POST-TRAUMATIC STRESS DISORDER): ICD-10-CM

## 2025-01-28 DIAGNOSIS — F33.1 MDD (MAJOR DEPRESSIVE DISORDER), RECURRENT EPISODE, MODERATE: ICD-10-CM

## 2025-01-28 DIAGNOSIS — G47.00 INSOMNIA, UNSPECIFIED TYPE: ICD-10-CM

## 2025-01-28 PROCEDURE — 99999 PR PBB SHADOW E&M-EST. PATIENT-LVL II: CPT | Mod: PBBFAC,,, | Performed by: NURSE PRACTITIONER

## 2025-01-28 PROCEDURE — 99214 OFFICE O/P EST MOD 30 MIN: CPT | Mod: S$GLB,,, | Performed by: NURSE PRACTITIONER

## 2025-01-28 PROCEDURE — G2211 COMPLEX E/M VISIT ADD ON: HCPCS | Mod: S$GLB,,, | Performed by: NURSE PRACTITIONER

## 2025-01-28 PROCEDURE — 1159F MED LIST DOCD IN RCRD: CPT | Mod: CPTII,S$GLB,, | Performed by: NURSE PRACTITIONER

## 2025-01-28 PROCEDURE — 1160F RVW MEDS BY RX/DR IN RCRD: CPT | Mod: CPTII,S$GLB,, | Performed by: NURSE PRACTITIONER

## 2025-01-28 RX ORDER — DULOXETIN HYDROCHLORIDE 60 MG/1
60 CAPSULE, DELAYED RELEASE ORAL DAILY
Qty: 30 CAPSULE | Refills: 3 | Status: SHIPPED | OUTPATIENT
Start: 2025-01-28

## 2025-01-28 RX ORDER — AMITRIPTYLINE HYDROCHLORIDE 50 MG/1
100 TABLET, FILM COATED ORAL NIGHTLY
Qty: 60 TABLET | Refills: 3 | Status: SHIPPED | OUTPATIENT
Start: 2025-01-28 | End: 2026-01-28
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
## 2025-04-19 ENCOUNTER — OFFICE VISIT (OUTPATIENT)
Dept: URGENT CARE | Facility: CLINIC | Age: 35
End: 2025-04-19
Payer: COMMERCIAL

## 2025-04-19 VITALS
TEMPERATURE: 99 F | DIASTOLIC BLOOD PRESSURE: 68 MMHG | BODY MASS INDEX: 22.73 KG/M2 | HEIGHT: 68 IN | WEIGHT: 150 LBS | SYSTOLIC BLOOD PRESSURE: 104 MMHG | HEART RATE: 73 BPM | OXYGEN SATURATION: 98 % | RESPIRATION RATE: 18 BRPM

## 2025-04-19 DIAGNOSIS — J01.90 ACUTE NON-RECURRENT SINUSITIS, UNSPECIFIED LOCATION: ICD-10-CM

## 2025-04-19 DIAGNOSIS — H66.001 NON-RECURRENT ACUTE SUPPURATIVE OTITIS MEDIA OF RIGHT EAR WITHOUT SPONTANEOUS RUPTURE OF TYMPANIC MEMBRANE: Primary | ICD-10-CM

## 2025-04-19 PROCEDURE — 99213 OFFICE O/P EST LOW 20 MIN: CPT | Mod: S$GLB,,, | Performed by: NURSE PRACTITIONER

## 2025-04-19 RX ORDER — AMOXICILLIN AND CLAVULANATE POTASSIUM 875; 125 MG/1; MG/1
1 TABLET, FILM COATED ORAL 2 TIMES DAILY
Qty: 20 TABLET | Refills: 0 | Status: SHIPPED | OUTPATIENT
Start: 2025-04-19

## 2025-04-19 NOTE — PATIENT INSTRUCTIONS
- You must understand that you have received an Urgent Care treatment only and that you may be released before all of your medical problems are known or treated.   - You, the patient, will arrange for follow up care as instructed.   - If your condition worsens or fails to improve we recommend that you receive another evaluation at the ER immediately or contact your PCP to discuss your concerns.   - You can call [phone] or [phone] to help schedule an appointment with the appropriate provider.    Drink plenty of fluids   Get lots of rest  Tylenol or ibuprofen for pain/fever  Mucinex DM for cough  - Take over-the-counter claritin, zyrtec, allegra, or xyzal as directed.  You should NOT use a decongestant form (D) of this medication if you have a history of hypertension or heart disease.   Saline nasal rinses to irrigate sinus cavities  Warm salt water gargles for sore throat

## 2025-04-19 NOTE — PROGRESS NOTES
"Subjective:      Patient ID: Aby Correa is a 34 y.o. female.    Vitals:  height is 5' 8" (1.727 m) and weight is 68 kg (150 lb). Her oral temperature is 98.7 °F (37.1 °C). Her blood pressure is 104/68 and her pulse is 73. Her respiration is 18 and oxygen saturation is 98%.     Chief Complaint: Nasal Congestion    Patient is a 33 yo female w/ c/o 3 days of nasal congestion, sinus pressure, white discharge in right eye, along with right ear pain. She has taken sudafed for this with no relief. Possible sick contacts at work.     Doesn't not want to do a flu or covid test    URI   This is a new problem. The current episode started in the past 7 days. The problem has been gradually worsening. There has been no fever. Associated symptoms include congestion, coughing, headaches and sinus pain. Pertinent negatives include no joint pain, joint swelling, nausea or neck pain. She has tried antihistamine for the symptoms.       HENT:  Positive for congestion and sinus pain.    Neck: Negative for neck pain.   Respiratory:  Positive for cough.    Gastrointestinal:  Negative for nausea.   Neurological:  Positive for headaches.      Objective:     Physical Exam   Constitutional: She is oriented to person, place, and time. She appears well-developed. She is cooperative.  Non-toxic appearance. She does not appear ill. No distress.   HENT:   Head: Normocephalic and atraumatic.   Ears:   Right Ear: Hearing, external ear and ear canal normal. Tympanic membrane is erythematous and bulging. A middle ear effusion is present.   Left Ear: Hearing, tympanic membrane, external ear and ear canal normal. Tympanic membrane is not erythematous and not bulging.  No middle ear effusion.   Nose: No mucosal edema, rhinorrhea or nasal deformity. No epistaxis. Right sinus exhibits maxillary sinus tenderness. Right sinus exhibits no frontal sinus tenderness. Left sinus exhibits maxillary sinus tenderness. Left sinus exhibits no frontal sinus " tenderness.   Mouth/Throat: Uvula is midline and mucous membranes are normal. No trismus in the jaw. Normal dentition. No uvula swelling. Posterior oropharyngeal erythema present. No oropharyngeal exudate or posterior oropharyngeal edema.   Eyes: Conjunctivae and lids are normal. No scleral icterus.   Neck: Trachea normal and phonation normal. Neck supple. No edema present. No erythema present. No neck rigidity present.   Cardiovascular: Normal rate, regular rhythm, normal heart sounds and normal pulses.   Pulmonary/Chest: Effort normal and breath sounds normal. No respiratory distress. She has no decreased breath sounds. She has no rhonchi.   Abdominal: Normal appearance.   Musculoskeletal: Normal range of motion.         General: No deformity. Normal range of motion.   Neurological: She is alert and oriented to person, place, and time. She exhibits normal muscle tone. Coordination normal.   Skin: Skin is warm, dry, intact, not diaphoretic and not pale.   Psychiatric: Her speech is normal and behavior is normal. Judgment and thought content normal.   Nursing note and vitals reviewed.      Assessment:     1. Non-recurrent acute suppurative otitis media of right ear without spontaneous rupture of tympanic membrane    2. Acute non-recurrent sinusitis, unspecified location        Plan:       Non-recurrent acute suppurative otitis media of right ear without spontaneous rupture of tympanic membrane  -     amoxicillin-clavulanate 875-125mg (AUGMENTIN) 875-125 mg per tablet; Take 1 tablet by mouth 2 (two) times daily.  Dispense: 20 tablet; Refill: 0    Acute non-recurrent sinusitis, unspecified location      Patient Instructions   - You must understand that you have received an Urgent Care treatment only and that you may be released before all of your medical problems are known or treated.   - You, the patient, will arrange for follow up care as instructed.   - If your condition worsens or fails to improve we recommend  that you receive another evaluation at the ER immediately or contact your PCP to discuss your concerns.   - You can call [phone] or [phone] to help schedule an appointment with the appropriate provider.    Drink plenty of fluids   Get lots of rest  Tylenol or ibuprofen for pain/fever  Mucinex DM for cough  - Take over-the-counter claritin, zyrtec, allegra, or xyzal as directed.  You should NOT use a decongestant form (D) of this medication if you have a history of hypertension or heart disease.   Saline nasal rinses to irrigate sinus cavities  Warm salt water gargles for sore throat

## 2025-04-23 ENCOUNTER — OFFICE VISIT (OUTPATIENT)
Dept: URGENT CARE | Facility: CLINIC | Age: 35
End: 2025-04-23
Payer: COMMERCIAL

## 2025-04-23 VITALS
TEMPERATURE: 98 F | OXYGEN SATURATION: 98 % | DIASTOLIC BLOOD PRESSURE: 96 MMHG | HEIGHT: 68 IN | SYSTOLIC BLOOD PRESSURE: 144 MMHG | BODY MASS INDEX: 22.73 KG/M2 | WEIGHT: 150 LBS | HEART RATE: 87 BPM | RESPIRATION RATE: 18 BRPM

## 2025-04-23 DIAGNOSIS — J34.89 SINUS PRESSURE: ICD-10-CM

## 2025-04-23 DIAGNOSIS — B96.89 ACUTE BACTERIAL SINUSITIS: Primary | ICD-10-CM

## 2025-04-23 DIAGNOSIS — J01.90 ACUTE BACTERIAL SINUSITIS: Primary | ICD-10-CM

## 2025-04-23 PROCEDURE — 99213 OFFICE O/P EST LOW 20 MIN: CPT | Mod: S$GLB,,, | Performed by: NURSE PRACTITIONER

## 2025-04-23 RX ORDER — PREDNISONE 20 MG/1
40 TABLET ORAL DAILY
Qty: 10 TABLET | Refills: 0 | Status: SHIPPED | OUTPATIENT
Start: 2025-04-23 | End: 2025-04-28

## 2025-04-23 NOTE — PATIENT INSTRUCTIONS
- You must understand that you have received an Urgent Care treatment only and that you may be released before all of your medical problems are known or treated.   - You, the patient, will arrange for follow up care as instructed.   - If your condition worsens or fails to improve we recommend that you receive another evaluation at the ER immediately or contact your PCP to discuss your concerns.   - You can call [phone] or [phone] to help schedule an appointment with the appropriate provider.    Drink plenty of fluids   Get lots of rest  Tylenol or ibuprofen for pain/fever  Mucinex DM for cough  Saline nasal rinses to irrigate sinus cavities  Warm salt water gargles for sore throat

## 2025-04-23 NOTE — PROGRESS NOTES
"Subjective:      Patient ID: Aby Correa is a 34 y.o. female.    Vitals:  height is 5' 8" (1.727 m) and weight is 68 kg (150 lb). Her oral temperature is 97.6 °F (36.4 °C). Her blood pressure is 144/96 (abnormal) and her pulse is 87. Her respiration is 18 and oxygen saturation is 98%.     Chief Complaint: Sinus Problem    Pt is a 34 yr old female who was diagnosed last Saturday with a sinus and ear infection and prescribed augmentin. Pt states she began feeling better briefly, but started w/ sinus pressure again yesterday and today. She has had no relief. She is still taking her antibiotics. She has also been taking ibuprofen and sudafed.     Sinus Problem  This is a new problem. The current episode started 1 to 4 weeks ago. The problem has been gradually worsening since onset. Her pain is at a severity of 5/10. The pain is mild. Associated symptoms include headaches. Pertinent negatives include no chills, congestion, coughing, diaphoresis, ear pain, hoarse voice, neck pain, shortness of breath, sinus pressure, sneezing, sore throat or swollen glands.     Constitution: Negative for chills and sweating.   HENT:  Negative for ear pain, congestion, sinus pressure and sore throat.    Neck: Negative for neck pain.   Respiratory:  Negative for cough and shortness of breath.    Allergic/Immunologic: Negative for sneezing.   Neurological:  Positive for headaches.      Objective:     Physical Exam   Constitutional: She is oriented to person, place, and time. She appears well-developed. She is cooperative.  Non-toxic appearance. She does not appear ill. No distress.   HENT:   Head: Normocephalic and atraumatic.   Ears:   Right Ear: Hearing, external ear and ear canal normal. Tympanic membrane is erythematous.   Left Ear: Hearing, tympanic membrane, external ear and ear canal normal. Tympanic membrane is not erythematous.   Nose: No mucosal edema, rhinorrhea or nasal deformity. No epistaxis. Right sinus exhibits frontal " sinus tenderness. Right sinus exhibits no maxillary sinus tenderness. Left sinus exhibits frontal sinus tenderness. Left sinus exhibits no maxillary sinus tenderness.   Mouth/Throat: Uvula is midline, oropharynx is clear and moist and mucous membranes are normal. No trismus in the jaw. Normal dentition. No uvula swelling. No oropharyngeal exudate, posterior oropharyngeal edema or posterior oropharyngeal erythema.   Eyes: Conjunctivae and lids are normal. No scleral icterus.   Neck: Trachea normal and phonation normal. Neck supple. No edema present. No erythema present. No neck rigidity present.   Cardiovascular: Normal rate, regular rhythm, normal heart sounds and normal pulses.   Pulmonary/Chest: Effort normal and breath sounds normal. No respiratory distress. She has no decreased breath sounds. She has no rhonchi.   Abdominal: Normal appearance.   Musculoskeletal: Normal range of motion.         General: No deformity. Normal range of motion.   Neurological: She is alert and oriented to person, place, and time. She exhibits normal muscle tone. Coordination normal.   Skin: Skin is warm, dry, intact, not diaphoretic and not pale.   Psychiatric: Her speech is normal and behavior is normal. Judgment and thought content normal.   Nursing note and vitals reviewed.      Assessment:     1. Acute bacterial sinusitis    2. Sinus pressure        Plan:       Acute bacterial sinusitis  -     predniSONE (DELTASONE) 20 MG tablet; Take 2 tablets (40 mg total) by mouth once daily. for 5 days  Dispense: 10 tablet; Refill: 0  -     Ambulatory referral/consult to ENT    Sinus pressure      Patient Instructions   - You must understand that you have received an Urgent Care treatment only and that you may be released before all of your medical problems are known or treated.   - You, the patient, will arrange for follow up care as instructed.   - If your condition worsens or fails to improve we recommend that you receive another  evaluation at the ER immediately or contact your PCP to discuss your concerns.   - You can call [phone] or [phone] to help schedule an appointment with the appropriate provider.    Drink plenty of fluids   Get lots of rest  Tylenol or ibuprofen for pain/fever  Mucinex DM for cough  Saline nasal rinses to irrigate sinus cavities  Warm salt water gargles for sore throat

## 2025-05-05 RX ORDER — AMITRIPTYLINE HYDROCHLORIDE 50 MG/1
100 TABLET, FILM COATED ORAL NIGHTLY
Qty: 180 TABLET | Refills: 1 | Status: SHIPPED | OUTPATIENT
Start: 2025-05-05 | End: 2026-05-05

## 2025-05-08 ENCOUNTER — OFFICE VISIT (OUTPATIENT)
Dept: OTOLARYNGOLOGY | Facility: CLINIC | Age: 35
End: 2025-05-08
Payer: COMMERCIAL

## 2025-05-08 VITALS
BODY MASS INDEX: 25.08 KG/M2 | HEART RATE: 91 BPM | HEIGHT: 68 IN | WEIGHT: 165.5 LBS | SYSTOLIC BLOOD PRESSURE: 128 MMHG | DIASTOLIC BLOOD PRESSURE: 80 MMHG

## 2025-05-08 DIAGNOSIS — R59.1 LYMPHADENOPATHY: ICD-10-CM

## 2025-05-08 DIAGNOSIS — S09.91XA TRAUMA OF EAR CANAL, INITIAL ENCOUNTER: Primary | ICD-10-CM

## 2025-05-08 DIAGNOSIS — H69.91 ETD (EUSTACHIAN TUBE DYSFUNCTION), RIGHT: ICD-10-CM

## 2025-05-08 PROCEDURE — 3074F SYST BP LT 130 MM HG: CPT | Mod: CPTII,S$GLB,,

## 2025-05-08 PROCEDURE — 3008F BODY MASS INDEX DOCD: CPT | Mod: CPTII,S$GLB,,

## 2025-05-08 PROCEDURE — 99203 OFFICE O/P NEW LOW 30 MIN: CPT | Mod: S$GLB,,,

## 2025-05-08 PROCEDURE — 1159F MED LIST DOCD IN RCRD: CPT | Mod: CPTII,S$GLB,,

## 2025-05-08 PROCEDURE — 3079F DIAST BP 80-89 MM HG: CPT | Mod: CPTII,S$GLB,,

## 2025-05-08 RX ORDER — OFLOXACIN 3 MG/ML
5 SOLUTION AURICULAR (OTIC) DAILY
Qty: 10 ML | Refills: 0 | Status: SHIPPED | OUTPATIENT
Start: 2025-05-08 | End: 2025-05-13

## 2025-05-08 RX ORDER — FLUTICASONE PROPIONATE 50 MCG
2 SPRAY, SUSPENSION (ML) NASAL DAILY
Qty: 16 G | Refills: 11 | Status: SHIPPED | OUTPATIENT
Start: 2025-05-08

## 2025-05-08 NOTE — PROGRESS NOTES
Subjective:   Aby Correa is a 34 y.o. female who was self-referred for ear pressure and swollen lymph nodes. She reports that beginning April 17 she began having sinus pain and pressure. The next day she started having ear pain and pressure in the right ear. She was prescribed Augmentin for sinusitis and right OM on 4/19 at . Did not have complete resolution so went back to  on 4/23 and prescribed prednisone. Sinusitis has completely resolved but she continues to have right ear pain. The ears pop when she swallows. Right ear a little muffled but no tinnitus or dizziness. Also recently noticed some tender lymph nodes in her neck for a few days.     No history of ear infections prior to 3 weeks ago.     Past Medical History  She has a past medical history of Alcohol abuse, Anxiety, Depression, Marijuana smoker, continuous, Psychiatric exam requested by authority, PTSD (post-traumatic stress disorder), and Therapy.    Past Surgical History  She has a past surgical history that includes Strabismus surgery (Bilateral, 09/05/12); Esophagogastroduodenoscopy (N/A, 7/29/2021); Colonoscopy (N/A, 7/29/2021); Intrauterine device insertion (2015 / 2021); and Strabismus surgery (Bilateral, 12/29/2021).    Family History  Her family history includes ADD / ADHD in her father; Anxiety disorder in her mother; Depression in her mother; Prostate cancer in her father.    Social History  She reports that she has never smoked. She has never been exposed to tobacco smoke. She has never used smokeless tobacco. She reports current alcohol use. She reports current drug use. Frequency: 7.00 times per week. Drug: Marijuana.    Allergies  She has no known allergies.    Medications  She has a current medication list which includes the following prescription(s): amitriptyline, amoxicillin-clavulanate 875-125mg, duloxetine, fluzone quad [id number] (pf), albuterol, clonazepam, fluticasone propionate, ofloxacin, and pfizer covid bival(12y  up)(pf), and the following Facility-Administered Medications: levonorgestrel.  Review of Systems     Constitutional: Negative for chills and fever.      HENT: Positive for ear pain and hearing loss.  Negative for ear discharge, facial swelling, ringing in the ears, runny nose, sinus pressure, sore throat and trouble swallowing.      Allergy: Positive for seasonal allergies.     Neurological: Negative for dizziness.          Objective:     Constitutional:   She appears well-developed and well-nourished.     Head:  Normocephalic.     Ears:    Right Ear: No swelling or tenderness. Tympanic membrane is not scarred, not retracted and not bulging. No middle ear effusion.   Left Ear: No drainage, swelling or tenderness. Tympanic membrane is not scarred, not retracted and not bulging.  No middle ear effusion.   Right dried blood in EAC. Posterior, inferior canal near TM.     Nose:  Rhinorrhea present. No turbinate hypertrophy.  Right sinus exhibits no maxillary sinus tenderness and no frontal sinus tenderness. Left sinus exhibits no maxillary sinus tenderness and no frontal sinus tenderness.     Mouth/Throat  Oropharynx clear and moist without lesions or asymmetry and normal uvula midline.     Neck:    Mildly tender anterior cervical chain lymphadenopathy      Pulmonary/Chest:   Effort normal.     Procedure  None    Imaging  No pertinent imaging available.  Audiogram    Assessment:     1. Trauma of ear canal, initial encounter    2. ETD (Eustachian tube dysfunction), right    3. Lymphadenopathy      Plan:   Aby was seen today for neck swelling and ear fullness.    Diagnoses and all orders for this visit:    Trauma of ear canal, initial encounter  -     ofloxacin (FLOXIN) 0.3 % otic solution; Place 5 drops into both ears once daily. for 5 days    ETD (Eustachian tube dysfunction), right  -     fluticasone propionate (FLONASE) 50 mcg/actuation nasal spray; 2 sprays (100 mcg total) by Each Nostril route once daily.  -  Begin daily Flonase     Lymphadenopathy  -     US Soft Tissue Head Neck; Future  - Discussed that lymphadenopathy is most likely reactive after sinusitis and OM. If not improved in one month, US of neck ordered. If improved, can cancel.      - 1 month follow up to re-evaluate with same day audiogram.
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
## 2025-08-02 ENCOUNTER — PATIENT MESSAGE (OUTPATIENT)
Dept: PSYCHIATRY | Facility: CLINIC | Age: 35
End: 2025-08-02
Payer: COMMERCIAL

## 2025-08-04 DIAGNOSIS — F33.1 MDD (MAJOR DEPRESSIVE DISORDER), RECURRENT EPISODE, MODERATE: ICD-10-CM

## 2025-08-04 RX ORDER — DULOXETIN HYDROCHLORIDE 60 MG/1
60 CAPSULE, DELAYED RELEASE ORAL DAILY
Qty: 90 CAPSULE | Refills: 1 | Status: SHIPPED | OUTPATIENT
Start: 2025-08-04

## 2025-08-04 RX ORDER — AMITRIPTYLINE HYDROCHLORIDE 50 MG/1
100 TABLET, FILM COATED ORAL NIGHTLY
Qty: 180 TABLET | Refills: 1 | Status: SHIPPED | OUTPATIENT
Start: 2025-08-04 | End: 2026-08-04

## (undated) DEVICE — DRESSING TRANS 2X2 TEGADERM

## (undated) DEVICE — TRAY MUSCLE LID EYE

## (undated) DEVICE — DRAPE STRABISMUS STRL 40X48IN

## (undated) DEVICE — CORD BIPOLAR 12 FOOT

## (undated) DEVICE — FORCEP CURVED DISP

## (undated) DEVICE — SOL BETADINE 5%

## (undated) DEVICE — SUT 6/0 18IN COATED VICRYL